# Patient Record
Sex: MALE | Race: WHITE | Employment: OTHER | URBAN - METROPOLITAN AREA
[De-identification: names, ages, dates, MRNs, and addresses within clinical notes are randomized per-mention and may not be internally consistent; named-entity substitution may affect disease eponyms.]

---

## 2017-03-16 ENCOUNTER — HOSPITAL ENCOUNTER (OUTPATIENT)
Dept: LAB | Age: 77
Discharge: HOME | End: 2017-03-16
Payer: MEDICARE

## 2017-03-16 ENCOUNTER — OFFICE VISIT (OUTPATIENT)
Dept: FAMILY MEDICINE CLINIC | Age: 77
End: 2017-03-16

## 2017-03-16 VITALS
HEIGHT: 69 IN | HEART RATE: 57 BPM | DIASTOLIC BLOOD PRESSURE: 74 MMHG | SYSTOLIC BLOOD PRESSURE: 140 MMHG | OXYGEN SATURATION: 94 % | TEMPERATURE: 98 F | WEIGHT: 220 LBS | RESPIRATION RATE: 22 BRPM | BODY MASS INDEX: 32.58 KG/M2

## 2017-03-16 DIAGNOSIS — I10 ESSENTIAL HYPERTENSION, BENIGN: ICD-10-CM

## 2017-03-16 DIAGNOSIS — E11.42 CONTROLLED TYPE 2 DIABETES MELLITUS WITH DIABETIC POLYNEUROPATHY, WITHOUT LONG-TERM CURRENT USE OF INSULIN (HCC): Primary | ICD-10-CM

## 2017-03-16 DIAGNOSIS — M43.16 SPONDYLOLISTHESIS OF LUMBAR REGION: ICD-10-CM

## 2017-03-16 DIAGNOSIS — E11.40 CONTROLLED TYPE 2 DIABETES MELLITUS WITH DIABETIC NEUROPATHY, UNSPECIFIED LONG TERM INSULIN USE STATUS: ICD-10-CM

## 2017-03-16 DIAGNOSIS — E78.2 MIXED HYPERLIPIDEMIA: ICD-10-CM

## 2017-03-16 LAB
GLUCOSE POC: 215 MG/DL
HBA1C MFR BLD HPLC: 8.9 %

## 2017-03-16 PROCEDURE — 80061 LIPID PANEL: CPT

## 2017-03-16 PROCEDURE — 36415 COLL VENOUS BLD VENIPUNCTURE: CPT

## 2017-03-16 PROCEDURE — 80053 COMPREHEN METABOLIC PANEL: CPT

## 2017-03-16 NOTE — MR AVS SNAPSHOT
Visit Information Date & Time Provider Department Dept. Phone Encounter #  
 3/16/2017  8:30 AM Garry Devries [phone] [id number] Follow-up Instructions Return in about 4 months (around 7/16/2017). Upcoming Health Maintenance Date Due  
 FOOT EXAM Q1 12/11/2016 HEMOGLOBIN A1C Q6M 5/17/2017 Pneumococcal 65+ High/Highest Risk (2 of 2 - PCV13) 8/17/2017 MEDICARE YEARLY EXAM 8/18/2017 EYE EXAM RETINAL OR DILATED Q1 11/8/2017 MICROALBUMIN Q1 11/17/2017 LIPID PANEL Q1 11/17/2017 GLAUCOMA SCREENING Q2Y 11/8/2018 DTaP/Tdap/Td series (2 - Td) 12/10/2024 Allergies as of 3/16/2017  Review Complete On: 3/16/2017 By: Tatyana Males Severity Noted Reaction Type Reactions Percocet [Oxycodone-acetaminophen]  02/28/2012    Other (comments) NIGHTMARES Current Immunizations  Reviewed on 8/17/2016 Name Date Influenza High Dose Vaccine PF 11/17/2016 Influenza Vaccine Split 10/11/2011 Pneumococcal Polysaccharide (PPSV-23) 8/17/2016, 12/2/2013 Tdap 12/10/2014 Not reviewed this visit You Were Diagnosed With   
  
 Codes Comments Controlled type 2 diabetes mellitus with diabetic polyneuropathy, without long-term current use of insulin (HCC)    -  Primary ICD-10-CM: E11.42 
ICD-9-CM: 250.60, 357.2 Essential hypertension, benign     ICD-10-CM: I10 
ICD-9-CM: 401.1 Spondylolisthesis of lumbar region     ICD-10-CM: M43.16 
ICD-9-CM: 738.4 Mixed hyperlipidemia     ICD-10-CM: E78.2 ICD-9-CM: 272.2 Controlled type 2 diabetes mellitus with diabetic neuropathy, unspecified long term insulin use status (HCC)     ICD-10-CM: E11.40 ICD-9-CM: 250.60, 357.2 Vitals BP Pulse Temp Resp Height(growth percentile) Weight(growth percentile) 140/74 (BP 1 Location: Left arm, BP Patient Position: Sitting) (!) 57 98 °F (36.7 °C) (Oral) 22 5' 9\" (1.753 m) 220 lb (99.8 kg) SpO2 BMI Smoking Status 94% 32.49 kg/m2 Former Smoker Vitals History BMI and BSA Data Body Mass Index Body Surface Area  
 32.49 kg/m 2 2.2 m 2 Preferred Pharmacy Pharmacy Name Phone [id number] [place], [address] [facility] [address] [phone] Your Updated Medication List  
  
   
This list is accurate as of: 3/16/17  8:53 AM.  Always use your most recent med list. amLODIPine 10 mg tablet Commonly known as:  Rosamaria Nancy Take 1 Tab by mouth daily. aspirin 81 mg tablet Take 81 mg by mouth. atorvastatin 10 mg tablet Commonly known as:  LIPITOR Take 1 Tab by mouth daily. glipiZIDE 5 mg tablet Commonly known as:  Ariadna Tenorio Take 1 Tab by mouth two (2) times a day. * glucose blood VI test strips strip Commonly known as:  ASCENSIA AUTODISC VI, ONE TOUCH ULTRA TEST VI  
by Does Not Apply route daily. * ONETOUCH ULTRA TEST strip Generic drug:  glucose blood VI test strips USE TO TEST BLOOD SUGAR ONC E DAILY * glucose blood VI test strips strip Commonly known as:  ONETOUCH ULTRA TEST Test Blood Sugar once daily  Dx. Code E11.49  
  
 metFORMIN 850 mg tablet Commonly known as:  GLUCOPHAGE  
TAKE 1 TABLET BY MOUTH BEFORE [facility] Oxygen-Air Delivery Systems 2 lpm at night  
  
 quinapril 10 mg tablet Commonly known as:  ACCUPRIL Take 1 Tab by mouth nightly. sildenafil citrate 50 mg tablet Commonly known as:  VIAGRA Take 1 Tab by mouth as needed. * Notice: This list has 3 medication(s) that are the same as other medications prescribed for you. Read the directions carefully, and ask your doctor or other care provider to review them with you. Prescriptions Printed Refills  
 glucose blood VI test strips (ONETOUCH ULTRA TEST) strip 11 Sig: Test Blood Sugar once daily Dx. Code E11.49 Class: Print We Performed the Following AMB POC GLUCOSE, QUANTITATIVE, BLOOD [37473 CPT(R)] AMB POC HEMOGLOBIN A1C [73741 CPT(R)]  DIABETES FOOT EXAM [HM7 Custom] LIPID PANEL [89688 CPT(R)] METABOLIC PANEL, COMPREHENSIVE [69436 CPT(R)] Follow-up Instructions Return in about 4 months (around 7/16/2017). Introducing [facility] & HEALTH SERVICES! Stefan Reid introduces [employer] patient portal. Now you can access parts of your medical record, email your doctor's office, and request medication refills online. 1. In your internet browser, go to [email or web address] 2. Click on the First Time User? Click Here link in the Sign In box. You will see the New Member Sign Up page. 3. Enter your [employer] Access Code exactly as it appears below. You will not need to use this code after youve completed the sign-up process. If you do not sign up before the expiration date, you must request a new code. · [employer] Access Code: [email or web address] Expires: 6/14/2017  8:36 AM 
 
4. Enter the last four digits of your Social Security Number (xxxx) and Date of Birth (mm/dd/yyyy) as indicated and click Submit. You will be taken to the next sign-up page. 5. Create a [employer] ID. This will be your [employer] login ID and cannot be changed, so think of one that is secure and easy to remember. 6. Create a [employer] password. You can change your password at any time. 7. Enter your Password Reset Question and Answer. This can be used at a later time if you forget your password. 8. Enter your e-mail address. You will receive e-mail notification when new information is available in [address] 9. Click Sign Up. You can now view and download portions of your medical record. 10. Click the Download Summary menu link to download a portable copy of your medical information.  
 
If you have questions, please visit the Frequently Asked Questions section of the [employer]. Remember, [employer] is NOT to be used for urgent needs. For medical emergencies, dial 911. Now available from your iPhone and Android! Please provide this summary of care documentation to your next provider. Your primary care clinician is listed as Williams Brunner. If you have any questions after today's visit, please call [phone].

## 2017-03-16 NOTE — PROGRESS NOTES
HISTORY OF PRESENT ILLNESS  Miki Hung is a 68 y.o. male. F/u hbp,dm2 copd,chol. Doing ok. Walking at gym 5 days per week,feeling well,no c/o  Diabetes   The history is provided by the patient. This is a chronic problem. The problem occurs daily. The problem has not changed since onset. Hypertension    This is a chronic problem. The problem has not changed since onset. Pertinent negatives include no malaise/fatigue, no neck pain and no peripheral edema. Cholesterol Problem   This is a chronic problem. The problem occurs daily. The problem has not changed since onset. Review of Systems   Constitutional: Negative for fever and malaise/fatigue. Gastrointestinal: Negative for blood in stool, constipation and melena. Genitourinary: Negative for dysuria, frequency and urgency. Musculoskeletal: Negative for neck pain. Physical Exam   Constitutional: He is oriented to person, place, and time. He appears well-developed and well-nourished. HENT:   Head: Normocephalic and atraumatic. Right Ear: External ear normal.   Left Ear: External ear normal.   Nose: Nose normal.   Mouth/Throat: Oropharynx is clear and moist.   Eyes: Conjunctivae are normal. Pupils are equal, round, and reactive to light. Neck: Normal range of motion. Neck supple. No tracheal deviation present. No thyromegaly present. Cardiovascular: Normal rate, regular rhythm, normal heart sounds and intact distal pulses. Pulmonary/Chest: Effort normal and breath sounds normal. No respiratory distress. He has no wheezes. Abdominal: Soft. Bowel sounds are normal. He exhibits no distension and no mass. There is no tenderness. There is no guarding. Musculoskeletal: Normal range of motion. Comprehensive Diabetic Foot Exam  was performed    . Skin: Skin is warm and dry. Comprehensive Diabetic Foot Exam  was performed     Psychiatric: He has a normal mood and affect. His behavior is normal.   Vitals reviewed.       ASSESSMENT and REDD Nunes was seen today for diabetes, hypertension, cholesterol problem and annual wellness visit. Diagnoses and all orders for this visit:    Encounter for immunization  -     Pneumococcal Polysaccharide vaccine, 23-Valent, Adult or Immunocompromised    Mixed hyperlipidemia  -     atorvastatin (LIPITOR) 10 mg tablet; Take 1 Tab by mouth daily.  -     LIPID PANEL    DM (diabetes mellitus) type II controlled, neurological manifestation (HCC),poor control,wants to try improving diet till next visit before adding additional medication. Diet and exercise encouraged  -     glipiZIDE (GLUCOTROL) 5 mg tablet; Take 1 Tab by mouth two (2) times a day. -     metFORMIN (GLUCOPHAGE) 850 mg tablet; TAKE 1 TABLET BY MOUTH BEFORE BREAKFAST,LUNCH & DINNER  -     AMB POC HEMOGLOBIN B8M  -     METABOLIC PANEL, COMPREHENSIVE  -     AMB POC GLUCOSE, QUANTITATIVE, BLOOD    Essential hypertension, benign  -     amLODIPine (NORVASC) 10 mg tablet; Take 1 Tab by mouth daily. Essential hypertension, hypertension with unspecified goal  -     quinapril (ACCUPRIL) 10 mg tablet; Take 1 Tab by mouth nightly.         Follow-up Disposition: Not on File

## 2017-03-16 NOTE — PROGRESS NOTES
Chief Complaint   Patient presents with    Diabetes     F/U on diabetes.  Hypertension     F/U on BP.  Cholesterol Problem     F/U on cholesterol.

## 2017-03-17 LAB
ALBUMIN SERPL-MCNC: 4.5 G/DL (ref 3.5–4.8)
ALBUMIN/GLOB SERPL: 1.4 {RATIO} (ref 1.2–2.2)
ALP SERPL-CCNC: 63 IU/L (ref 39–117)
ALT SERPL-CCNC: 14 IU/L (ref 0–44)
AST SERPL-CCNC: 17 IU/L (ref 0–40)
BILIRUB SERPL-MCNC: 0.4 MG/DL (ref 0–1.2)
BUN SERPL-MCNC: 13 MG/DL (ref 8–27)
BUN/CREAT SERPL: 17 (ref 10–22)
CALCIUM SERPL-MCNC: 9.5 MG/DL (ref 8.6–10.2)
CHLORIDE SERPL-SCNC: 97 MMOL/L (ref 96–106)
CHOLEST SERPL-MCNC: 149 MG/DL (ref 100–199)
CO2 SERPL-SCNC: 23 MMOL/L (ref 18–29)
CREAT SERPL-MCNC: 0.76 MG/DL (ref 0.76–1.27)
GLOBULIN SER CALC-MCNC: 3.2 G/DL (ref 1.5–4.5)
GLUCOSE SERPL-MCNC: 203 MG/DL (ref 65–99)
HDLC SERPL-MCNC: 43 MG/DL
INTERPRETATION, 910389: NORMAL
LDLC SERPL CALC-MCNC: 69 MG/DL (ref 0–99)
POTASSIUM SERPL-SCNC: 4.9 MMOL/L (ref 3.5–5.2)
PROT SERPL-MCNC: 7.7 G/DL (ref 6–8.5)
SODIUM SERPL-SCNC: 140 MMOL/L (ref 134–144)
TRIGL SERPL-MCNC: 187 MG/DL (ref 0–149)
VLDLC SERPL CALC-MCNC: 37 MG/DL (ref 5–40)

## 2017-05-22 ENCOUNTER — OFFICE VISIT (OUTPATIENT)
Dept: FAMILY MEDICINE CLINIC | Age: 77
End: 2017-05-22

## 2017-05-22 VITALS
DIASTOLIC BLOOD PRESSURE: 76 MMHG | HEIGHT: 69 IN | OXYGEN SATURATION: 97 % | WEIGHT: 217 LBS | TEMPERATURE: 98.3 F | SYSTOLIC BLOOD PRESSURE: 148 MMHG | HEART RATE: 61 BPM | BODY MASS INDEX: 32.14 KG/M2 | RESPIRATION RATE: 18 BRPM

## 2017-05-22 DIAGNOSIS — M67.911 DISORDER OF RIGHT ROTATOR CUFF: ICD-10-CM

## 2017-05-22 DIAGNOSIS — S43.401A SPRAIN OF RIGHT SHOULDER, UNSPECIFIED SHOULDER SPRAIN TYPE, INITIAL ENCOUNTER: Primary | ICD-10-CM

## 2017-05-22 RX ORDER — METHYLPREDNISOLONE ACETATE 40 MG/ML
40 INJECTION, SUSPENSION INTRA-ARTICULAR; INTRALESIONAL; INTRAMUSCULAR; SOFT TISSUE ONCE
Qty: 1 ML | Refills: 0
Start: 2017-05-22 | End: 2017-05-22

## 2017-05-22 NOTE — PROGRESS NOTES
.  Chief Complaint   Patient presents with    Shoulder Pain     right     Alinda Branch about 3 weeks ago off of a ladder. Complained of pain in right shoulder one week after fall. Pain is more severe when he has a twisting motion.

## 2017-05-22 NOTE — PROGRESS NOTES
Chief Complaint   Patient presents with    Shoulder Pain     right         DEPARTMENT [facility]  OFFICE PROCEDURE PROGRESS NOTE        Chart reviewed for the following:   Jacy Ramos, have reviewed the History, Physical and updated the Allergic reactions for [address] performed immediately prior to start of procedure:   Kanika FRASER, have performed the following reviews on [address] prior to the start of the procedure:            * Patient was identified by name and date of birth   * Agreement on procedure being performed was verified  * Risks and Benefits explained to the patient  * Procedure site verified and marked as necessary  * Patient was positioned for comfort  * Consent was signed and verified     Time: 4:20pm      Date of procedure: 5/22/2017    Procedure performed by: Fadi Oviedo MD    Provider assisted by: N/A    Patient assisted by:  N/A    How tolerated by patient:  Well    Post Procedural Pain Scale: 1/10    Comments: Pt received Right shoulder joint injection and tolerated it well, pain before the procedure a level 2/10 and after a level 1/10.

## 2017-05-22 NOTE — PATIENT INSTRUCTIONS
Joint Injections: Care Instructions  Your Care Instructions  Joint injections are shots into a joint, such as the knee. They may be used to put in medicines, such as pain relievers. Or they can be used to take out fluid. Sometimes the fluid is tested in a lab. This can help find the cause of a joint problem. A corticosteroid, or steroid, shot is used to reduce inflammation in tendons or joints. It is often used to treat problems such as arthritis, tendinitis, and bursitis. Steroids can be injected directly into a painful, inflamed joint. They can also help reduce inflammation of a bursa. A bursa is a sac of fluid. It cushions and lubricates areas where tendons, ligaments, skin, muscles, or bones rub against each other. A steroid shot can sometimes help with short-term pain relief when other treatments haven't worked. If steroid shots help, pain may improve for weeks or months. Follow-up care is a key part of your treatment and safety. Be sure to make and go to all appointments, and call your doctor if you are having problems. It's also a good idea to know your test results and keep a list of the medicines you take. How can you care for yourself at home? · Put ice or a cold pack on the area for 10 to 20 minutes at a time. Put a thin cloth between the ice and your skin. · Take anti-inflammatory medicines to reduce pain, swelling, or inflammation. These include ibuprofen (Advil, Motrin) and naproxen (Aleve). Read and follow all instructions on the label. · Avoid strenuous activities for several days, especially those that put stress on the area where you got the shot. · If you have dressings over the area, keep them clean and dry. You may remove them when your doctor tells you to. When should you call for help? Call your doctor now or seek immediate medical care if:  · You have signs of infection, such as:  ¨ Increased pain, swelling, warmth, or redness. ¨ Red streaks leading from the site.   ¨ Pus draining from the site. ¨ A fever. Watch closely for changes in your health, and be sure to contact your doctor if you have any problems. Where can you learn more? Go to http://lit-atiya.info/. Enter N616 in the search box to learn more about \"Joint Injections: Care Instructions. \"  Current as of: May 23, 2016  Content Version: 11.2  © 9211-8473 [employer]. Care instructions adapted under license by [employer] (which disclaims liability or warranty for this information). If you have questions about a medical condition or this instruction, always ask your healthcare professional. [address] any warranty or liability for your use of this information.

## 2017-05-22 NOTE — PROGRESS NOTES
HISTORY OF PRESENT ILLNESS  Bárbara Amador is a 68 y.o. male. fell 1 week ago twisting rt shoulder,Remainsm quite sore,painffull to rotate and twist  Shoulder Pain    The history is provided by the patient. The incident occurred more than 1 week ago. The incident occurred at home. The pain is at a severity of 5/10. The pain is moderate. The pain has been fluctuating since onset. Review of Systems   Constitutional: Negative for fever and malaise/fatigue. Musculoskeletal: Positive for joint pain. Negative for back pain and neck pain. Physical Exam   Constitutional: He appears well-developed and well-nourished. HENT:   Head: Normocephalic. Right Ear: External ear normal.   Left Ear: External ear normal.   Nose: Nose normal.   Mouth/Throat: Oropharynx is clear and moist.   Cardiovascular: Normal rate and regular rhythm. Pulmonary/Chest: Effort normal and breath sounds normal.   Musculoskeletal:        Right shoulder: He exhibits decreased range of motion, tenderness, crepitus and decreased strength. Rotator Cuff maneuvers were suggestive of RC Disorder         ASSESSMENT and PLAN  Des was seen today for shoulder pain. Diagnoses and all orders for this visit:    Sprain of right shoulder, unspecified shoulder sprain type, initial encounter  -     XR SHOULDER RIGHT AP / LATERAL; Future    Disorder of right rotator cuff  -     XR SHOULDER RIGHT AP / LATERAL; Future  -     (DEPO-MEDROL 40 mg  -   quantity 1 for Reimbursement) METHYLPREDNISOLONE ACETATE 40 mg injection  -     methylPREDNISolone acetate (DEPO-MEDROL) 40 mg/mL injection; 1 mL by IntraMUSCular route once for 1 dose. -     20610 - DRAIN/INJECT LARGE JOINT/BURSA      Follow-up Disposition: Not on File  Indications:   Rotator Cuff Syndrome    Procedure:  After consent was obtained,and procedure risks and benefits reviewed,using sterile technique the right shoulder joint    was prepped using Betadine.    The joint was entered [facility] 23 ga needle and 40 mg of Depo-Medrol and 1 ml Marcaine were injected without difficulty ,and the needle was withdrawn. The procedure was well tolerated,with negligile discomfort postprocedure. The patient was asked to continue to rest the joint fora few days before resuming normal activities. They were advised that there may be increased pain for the next 1-2 days,and to watch for fever,redness,or increased swelling or pain. They were advised to call if such symptoms develop. Joint Injection instruction sheet was given to the patient and reviewed. The patient departed in good condition

## 2017-05-22 NOTE — MR AVS SNAPSHOT
Visit Information Date & Time Provider Department Dept. Phone Encounter #  
 5/22/2017  3:00 PM [facility] Ratliff, [address] [facility] [phone] [id number] Your Appointments 7/12/2017  9:30 AM  
ROUTINE CARE with Max Ratliff MD  
[facility]) Appt Note: routine follow up  
 [address] [facility] [address] [phone] [address] [facility] [address] Upcoming Health Maintenance Date Due INFLUENZA AGE 9 TO ADULT 8/1/2017 Pneumococcal 65+ High/Highest Risk (2 of 2 - PCV13) 8/17/2017 MEDICARE YEARLY EXAM 8/18/2017 HEMOGLOBIN A1C Q6M 9/16/2017 EYE EXAM RETINAL OR DILATED Q1 11/8/2017 MICROALBUMIN Q1 11/17/2017 FOOT EXAM Q1 3/16/2018 LIPID PANEL Q1 3/16/2018 GLAUCOMA SCREENING Q2Y 11/8/2018 DTaP/Tdap/Td series (2 - Td) 12/10/2024 Allergies as of 5/22/2017  Review Complete On: 5/22/2017 By: Rosalba Olivia LPN Severity Noted Reaction Type Reactions Percocet [Oxycodone-acetaminophen]  02/28/2012    Other (comments) NIGHTMARES Current Immunizations  Reviewed on 8/17/2016 Name Date Influenza High Dose Vaccine PF 11/17/2016 Influenza Vaccine Split 10/11/2011 Pneumococcal Polysaccharide (PPSV-23) 8/17/2016, 12/2/2013 Tdap 12/10/2014 Not reviewed this visit You Were Diagnosed With   
  
 Codes Comments Sprain of right shoulder, unspecified shoulder sprain type, initial encounter    -  Primary ICD-10-CM: S43.401A ICD-9-CM: 840.9 Disorder of right rotator cuff     ICD-10-CM: M67.911 ICD-9-CM: 726.10 Vitals BP Pulse Temp Resp Height(growth percentile) Weight(growth percentile) 148/76 (BP 1 Location: Left arm, BP Patient Position: Sitting) 61 98.3 °F (36.8 °C) (Oral) 18 5' 9\" (1.753 m) 217 lb (98.4 kg) SpO2 BMI Smoking Status 97% 32.05 kg/m2 Former Smoker Vitals History BMI and BSA Data Body Mass Index Body Surface Area 32.05 kg/m 2 2.19 m 2 Preferred Pharmacy Pharmacy Name Phone [id number] [place], [address] [facility] [phone] Your Updated Medication List  
  
   
This list is accurate as of: 5/22/17  3:46 PM.  Always use your most recent med list. amLODIPine 10 mg tablet Commonly known as:  Gonzalez Romeo Take 1 Tab by mouth daily. aspirin 81 mg tablet Take 81 mg by mouth. atorvastatin 10 mg tablet Commonly known as:  LIPITOR Take 1 Tab by mouth daily. glipiZIDE 5 mg tablet Commonly known as:  Maurie Ray Take 1 Tab by mouth two (2) times a day. * glucose blood VI test strips strip Commonly known as:  ASCENSIA AUTODISC VI, ONE TOUCH ULTRA TEST VI  
by Does Not Apply route daily. * glucose blood VI test strips strip Commonly known as:  ONETOUCH ULTRA TEST Test Blood Sugar once daily  Dx. Code E11.49  
  
 metFORMIN 850 mg tablet Commonly known as:  GLUCOPHAGE  
TAKE 1 TABLET BY MOUTH BEFORE [facility]IN[facility] Oxygen-Air Delivery Systems 2 lpm at night  
  
 quinapril 10 mg tablet Commonly known as:  ACCUPRIL Take 1 Tab by mouth nightly. sildenafil citrate 50 mg tablet Commonly known as:  VIAGRA Take 1 Tab by mouth as needed. * Notice: This list has 2 medication(s) that are the same as other medications prescribed for you. Read the directions carefully, and ask your doctor or other care provider to review them with you. To-Do List   
 05/22/2017 Imaging:  XR SHOULDER RT AP/LAT MIN 2 V Introducing \[facility]\"" & HEALTH SERVICES! [facility] introduces [employer] patient portal. Now you can access parts of your medical record, email your doctor's office, and request medication refills online. 1. In your internet browser, go to [email or web address] 2. Click on the First Time User? Click Here link in the Sign In box. You will see the New Member Sign Up page. 3. Enter your [employer] Access Code exactly as it appears below. You will not need to use this code after youve completed the sign-up process. If you do not sign up before the expiration date, you must request a new code. · [employer] Access Code: [email or web address] Expires: 6/14/2017  8:36 AM 
 
4. Enter the last four digits of your Social Security Number (xxxx) and Date of Birth (mm/dd/yyyy) as indicated and click Submit. You will be taken to the next sign-up page. 5. Create a [employer] ID. This will be your [employer] login ID and cannot be changed, so think of one that is secure and easy to remember. 6. Create a [employer] password. You can change your password at any time. 7. Enter your Password Reset Question and Answer. This can be used at a later time if you forget your password. 8. Enter your e-mail address. You will receive e-mail notification when new information is available in [address] 9. Click Sign Up. You can now view and download portions of your medical record. 10. Click the Download Summary menu link to download a portable copy of your medical information. If you have questions, please visit the Frequently Asked Questions section of the [employer] website. Remember, [employer] is NOT to be used for urgent needs. For medical emergencies, dial 911. Now available from your iPhone and Android! Please provide this summary of care documentation to your next provider. Your primary care clinician is listed as Kristy Magana. If you have any questions after today's visit, please call [phone].

## 2017-06-12 ENCOUNTER — TELEPHONE (OUTPATIENT)
Dept: FAMILY MEDICINE CLINIC | Age: 77
End: 2017-06-12

## 2017-06-12 NOTE — TELEPHONE ENCOUNTER
----- Message from Cher Easton sent at 6/12/2017 10:19 AM EDT -----  Regarding: Dr. Danica Mcneill   Pt request to have a referral to see a specialist for his shoulder. Best contact number for pt is [phone].

## 2017-06-14 DIAGNOSIS — M67.911 DISORDER OF RIGHT ROTATOR CUFF: Primary | ICD-10-CM

## 2017-07-12 ENCOUNTER — OFFICE VISIT (OUTPATIENT)
Dept: FAMILY MEDICINE CLINIC | Age: 77
End: 2017-07-12

## 2017-07-12 ENCOUNTER — HOSPITAL ENCOUNTER (OUTPATIENT)
Dept: LAB | Age: 77
Discharge: HOME | End: 2017-07-12
Payer: MEDICARE

## 2017-07-12 VITALS
TEMPERATURE: 98.3 F | WEIGHT: 221.4 LBS | BODY MASS INDEX: 32.79 KG/M2 | HEART RATE: 63 BPM | RESPIRATION RATE: 22 BRPM | SYSTOLIC BLOOD PRESSURE: 128 MMHG | OXYGEN SATURATION: 94 % | HEIGHT: 69 IN | DIASTOLIC BLOOD PRESSURE: 78 MMHG

## 2017-07-12 DIAGNOSIS — E78.2 MIXED HYPERLIPIDEMIA: ICD-10-CM

## 2017-07-12 DIAGNOSIS — M43.16 SPONDYLOLISTHESIS OF LUMBAR REGION: ICD-10-CM

## 2017-07-12 DIAGNOSIS — I10 ESSENTIAL HYPERTENSION, BENIGN: ICD-10-CM

## 2017-07-12 DIAGNOSIS — E11.42 CONTROLLED TYPE 2 DIABETES MELLITUS WITH DIABETIC POLYNEUROPATHY, WITHOUT LONG-TERM CURRENT USE OF INSULIN (HCC): Primary | ICD-10-CM

## 2017-07-12 DIAGNOSIS — E11.40 CONTROLLED TYPE 2 DIABETES MELLITUS WITH DIABETIC NEUROPATHY, UNSPECIFIED LONG TERM INSULIN USE STATUS: ICD-10-CM

## 2017-07-12 LAB
GLUCOSE POC: 195 MG/DL
HBA1C MFR BLD HPLC: 9.3 %

## 2017-07-12 PROCEDURE — 80061 LIPID PANEL: CPT

## 2017-07-12 PROCEDURE — 36415 COLL VENOUS BLD VENIPUNCTURE: CPT

## 2017-07-12 PROCEDURE — 80053 COMPREHEN METABOLIC PANEL: CPT

## 2017-07-12 RX ORDER — AMLODIPINE BESYLATE 10 MG/1
10 TABLET ORAL DAILY
Qty: 90 TAB | Refills: 3 | Status: SHIPPED | OUTPATIENT
Start: 2017-07-12 | End: 2018-06-22 | Stop reason: SDUPTHER

## 2017-07-12 RX ORDER — MELOXICAM 15 MG/1
TABLET ORAL
Refills: 3 | COMMUNITY
Start: 2017-06-22 | End: 2017-10-25 | Stop reason: ALTCHOICE

## 2017-07-12 RX ORDER — QUINAPRIL 10 MG/1
10 TABLET ORAL
Qty: 90 TAB | Refills: 3 | Status: SHIPPED | OUTPATIENT
Start: 2017-07-12 | End: 2018-06-22 | Stop reason: SDUPTHER

## 2017-07-12 RX ORDER — ATORVASTATIN CALCIUM 10 MG/1
10 TABLET, FILM COATED ORAL DAILY
Qty: 90 TAB | Refills: 3 | Status: SHIPPED | OUTPATIENT
Start: 2017-07-12 | End: 2018-06-28 | Stop reason: SDUPTHER

## 2017-07-12 RX ORDER — METFORMIN HYDROCHLORIDE 850 MG/1
TABLET ORAL
Qty: 270 TAB | Refills: 2 | Status: SHIPPED | OUTPATIENT
Start: 2017-07-12 | End: 2018-04-07 | Stop reason: SDUPTHER

## 2017-07-12 RX ORDER — GLIPIZIDE 5 MG/1
5 TABLET ORAL 2 TIMES DAILY
Qty: 180 TAB | Refills: 3 | Status: CANCELLED | OUTPATIENT
Start: 2017-07-12

## 2017-07-12 RX ORDER — GLIPIZIDE 10 MG/1
10 TABLET ORAL 2 TIMES DAILY
Qty: 180 TAB | Refills: 3 | Status: SHIPPED | OUTPATIENT
Start: 2017-07-12 | End: 2018-06-22 | Stop reason: SDUPTHER

## 2017-07-12 NOTE — MR AVS SNAPSHOT
Visit Information Date & Time Provider Department Dept. Phone Encounter #  
 7/12/2017  9:30 AM Hemalatha Schmidt MD [facility] [phone] [id number] Follow-up Instructions Return in about 4 months (around 11/12/2017). Upcoming Health Maintenance Date Due INFLUENZA AGE 9 TO ADULT 8/1/2017 Pneumococcal 65+ High/Highest Risk (2 of 2 - PCV13) 8/17/2017 MEDICARE YEARLY EXAM 8/18/2017 HEMOGLOBIN A1C Q6M 9/16/2017 MICROALBUMIN Q1 11/17/2017 FOOT EXAM Q1 3/16/2018 LIPID PANEL Q1 3/16/2018 EYE EXAM RETINAL OR DILATED Q1 5/17/2018 GLAUCOMA SCREENING Q2Y 5/17/2019 DTaP/Tdap/Td series (2 - Td) 12/10/2024 Allergies as of 7/12/2017  Review Complete On: 7/12/2017 By: Edwin Klein Severity Noted Reaction Type Reactions Percocet [Oxycodone-acetaminophen]  02/28/2012    Other (comments) NIGHTMARES Current Immunizations  Reviewed on 8/17/2016 Name Date Influenza High Dose Vaccine PF 11/17/2016 Influenza Vaccine Split 10/11/2011 Pneumococcal Polysaccharide (PPSV-23) 8/17/2016, 12/2/2013 Tdap 12/10/2014 Not reviewed this visit You Were Diagnosed With   
  
 Codes Comments Controlled type 2 diabetes mellitus with diabetic polyneuropathy, without long-term current use of insulin (HCC)    -  Primary ICD-10-CM: E11.42 
ICD-9-CM: 250.60, 357.2 Essential hypertension, benign     ICD-10-CM: I10 
ICD-9-CM: 401.1 Spondylolisthesis of lumbar region     ICD-10-CM: M43.16 
ICD-9-CM: 738.4 Mixed hyperlipidemia     ICD-10-CM: E78.2 ICD-9-CM: 272.2 Controlled type 2 diabetes mellitus with diabetic neuropathy, unspecified long term insulin use status (HCC)     ICD-10-CM: E11.40 ICD-9-CM: 250.60, 357.2 Vitals BP Pulse Temp Resp Height(growth percentile) Weight(growth percentile)  128/78 (BP 1 Location: Left arm, BP Patient Position: Sitting) 63 98.3 °F (36.8 °C) (Oral) 22 5' 9\" (1.753 m) 221 lb 6.4 oz (100.4 kg) SpO2 BMI Smoking Status 94% 32.7 kg/m2 Former Smoker Vitals History BMI and BSA Data Body Mass Index Body Surface Area 32.7 kg/m 2 2.21 m 2 Preferred Pharmacy Pharmacy Name Phone [address] [phone] Your Updated Medication List  
  
   
This list is accurate as of: 7/12/17 10:50 AM.  Always use your most recent med list. amLODIPine 10 mg tablet Commonly known as:  Melisa Bhupendra Take 1 Tab by mouth daily. aspirin 81 mg tablet Take 81 mg by mouth. atorvastatin 10 mg tablet Commonly known as:  LIPITOR Take 1 Tab by mouth daily. * glipiZIDE 5 mg tablet Commonly known as:  Valdivia Pane Take 1 Tab by mouth two (2) times a day. * glipiZIDE 10 mg tablet Commonly known as:  Valdivia Pane Take 1 Tab by mouth two (2) times a day. * glucose blood VI test strips strip Commonly known as:  ASCENSIA AUTODISC VI, ONE TOUCH ULTRA TEST VI  
by Does Not Apply route daily. * glucose blood VI test strips strip Commonly known as:  ONETOUCH ULTRA TEST Test Blood Sugar once daily  Dx. Code E11.49  
  
 meloxicam 15 mg tablet Commonly known as:  MOBIC  
TAKE 1 TABLET BY MOUTH EVERY DAY WITH FOOD  
  
 metFORMIN 850 mg tablet Commonly known as:  GLUCOPHAGE  
TAKE 1 TABLET BY MOUTH BEFORE [facility] Oxygen-Air Delivery Systems 2 lpm at night  
  
 quinapril 10 mg tablet Commonly known as:  ACCUPRIL Take 1 Tab by mouth nightly. sildenafil citrate 50 mg tablet Commonly known as:  VIAGRA Take 1 Tab by mouth as needed. * Notice: This list has 4 medication(s) that are the same as other medications prescribed for you. Read the directions carefully, and ask your doctor or other care provider to review them with you. Prescriptions Sent to Pharmacy Refills  
 atorvastatin (LIPITOR) 10 mg tablet 3 Sig: Take 1 Tab by mouth daily. Class: Normal  
 Pharmacy: [address], [address] Ph #: [phone] Route: Oral  
 metFORMIN (GLUCOPHAGE) 850 mg tablet 2 Sig: TAKE 1 TABLET BY MOUTH BEFORE [facility] Class: Normal  
 Pharmacy: [address], [address] Ph #: [phone]  
 amLODIPine (NORVASC) 10 mg tablet 3 Sig: Take 1 Tab by mouth daily. Class: Normal  
 Pharmacy: [address], [address] Ph #: [phone] Route: Oral  
 quinapril (ACCUPRIL) 10 mg tablet 3 Sig: Take 1 Tab by mouth nightly. Class: Normal  
 Pharmacy: [address], [address] Ph #: [phone] Route: Oral  
 glipiZIDE (GLUCOTROL) 10 mg tablet 3 Sig: Take 1 Tab by mouth two (2) times a day. Class: Normal  
 Pharmacy: [address], [address] Ph #: [phone] Route: Oral  
  
We Performed the Following AMB POC GLUCOSE, QUANTITATIVE, BLOOD [57450 CPT(R)] AMB POC HEMOGLOBIN A1C [97747 CPT(R)] LIPID PANEL [19937 CPT(R)] METABOLIC PANEL, COMPREHENSIVE [49662 CPT(R)] Follow-up Instructions Return in about 4 months (around 11/12/2017). Introducing [facility]! Shin Cabrera introduces [employer] patient portal. Now you can access parts of your medical record, email your doctor's office, and request medication refills online. 1. In your internet browser, go to [email or web address] 2. Click on the First Time User? Click Here link in the Sign In box. You will see the New Member Sign Up page. 3. Enter your [employer] Access Code exactly as it appears below. You will not need to use this code after youve completed the sign-up process.  If you do not sign up before the expiration date, you must request a new code. · [employer] Access Code: [facility] Expires: 10/10/2017 10:02 AM 
 
4. Enter the last four digits of your Social Security Number (xxxx) and Date of Birth (mm/dd/yyyy) as indicated and click Submit. You will be taken to the next sign-up page. 5. Create a [employer] ID. This will be your [employer] login ID and cannot be changed, so think of one that is secure and easy to remember. 6. Create a [employer] password. You can change your password at any time. 7. Enter your Password Reset Question and Answer. This can be used at a later time if you forget your password. 8. Enter your e-mail address. You will receive e-mail notification when new information is available in [address] 9. Click Sign Up. You can now view and download portions of your medical record. 10. Click the Download Summary menu link to download a portable copy of your medical information. If you have questions, please visit the Frequently Asked Questions section of the [employer] website. Remember, [employer] is NOT to be used for urgent needs. For medical emergencies, dial 911. Now available from your iPhone and Android! Please provide this summary of care documentation to your next provider. Your primary care clinician is listed as Alexis Caldwell. If you have any questions after today's visit, please call [phone].

## 2017-07-13 LAB
ALBUMIN SERPL-MCNC: 4.8 G/DL (ref 3.5–4.8)
ALBUMIN/GLOB SERPL: 1.7 {RATIO} (ref 1.2–2.2)
ALP SERPL-CCNC: 67 IU/L (ref 39–117)
ALT SERPL-CCNC: 21 IU/L (ref 0–44)
AST SERPL-CCNC: 26 IU/L (ref 0–40)
BILIRUB SERPL-MCNC: 0.4 MG/DL (ref 0–1.2)
BUN SERPL-MCNC: 15 MG/DL (ref 8–27)
BUN/CREAT SERPL: 15 (ref 10–24)
CALCIUM SERPL-MCNC: 10.1 MG/DL (ref 8.6–10.2)
CHLORIDE SERPL-SCNC: 92 MMOL/L (ref 96–106)
CHOLEST SERPL-MCNC: 166 MG/DL (ref 100–199)
CO2 SERPL-SCNC: 23 MMOL/L (ref 18–29)
CREAT SERPL-MCNC: 0.98 MG/DL (ref 0.76–1.27)
GLOBULIN SER CALC-MCNC: 2.8 G/DL (ref 1.5–4.5)
GLUCOSE SERPL-MCNC: 196 MG/DL (ref 65–99)
HDLC SERPL-MCNC: 44 MG/DL
INTERPRETATION, 910389: NORMAL
LDLC SERPL CALC-MCNC: 77 MG/DL (ref 0–99)
Lab: NORMAL
POTASSIUM SERPL-SCNC: 5.2 MMOL/L (ref 3.5–5.2)
PROT SERPL-MCNC: 7.6 G/DL (ref 6–8.5)
SODIUM SERPL-SCNC: 134 MMOL/L (ref 134–144)
TRIGL SERPL-MCNC: 225 MG/DL (ref 0–149)
VLDLC SERPL CALC-MCNC: 45 MG/DL (ref 5–40)

## 2017-07-13 NOTE — PROGRESS NOTES
HISTORY OF PRESENT ILLNESS  Rashid Brower is a 68 y.o. male. F/u hbp,dm2 copd,chol. Doing ok  Diabetes   The history is provided by the patient. This is a chronic problem. The problem occurs daily. The problem has not changed since onset. Hypertension    This is a chronic problem. The problem has not changed since onset. Pertinent negatives include no malaise/fatigue, no neck pain and no peripheral edema. Cholesterol Problem   This is a chronic problem. The problem occurs daily. The problem has not changed since onset. Review of Systems   Constitutional: Negative for fever and malaise/fatigue. Gastrointestinal: Negative for blood in stool, constipation and melena. Genitourinary: Negative for dysuria, frequency and urgency. Musculoskeletal: Negative for neck pain. Physical Exam   Constitutional: He is oriented to person, place, and time. He appears well-developed and well-nourished. HENT:   Head: Normocephalic and atraumatic. Right Ear: External ear normal.   Left Ear: External ear normal.   Nose: Nose normal.   Mouth/Throat: Oropharynx is clear and moist.   Eyes: Conjunctivae are normal. Pupils are equal, round, and reactive to light. Neck: Normal range of motion. Neck supple. No tracheal deviation present. No thyromegaly present. Cardiovascular: Normal rate, regular rhythm, normal heart sounds and intact distal pulses. Pulmonary/Chest: Effort normal and breath sounds normal. No respiratory distress. He has no wheezes. Abdominal: Soft. Bowel sounds are normal. He exhibits no distension and no mass. There is no tenderness. There is no guarding. Musculoskeletal: Normal range of motion. Lymphadenopathy:     He has no cervical adenopathy. Neurological: He is alert and oriented to person, place, and time. Skin: Skin is warm and dry. Comprehensive Diabetic Foot Exam  was performed     Psychiatric: He has a normal mood and affect.  His behavior is normal.   Vitals reviewed. Des was seen today for diabetes, hypertension and cholesterol problem. Diagnoses and all orders for this visit:    Controlled type 2 diabetes mellitus with diabetic polyneuropathy, without long-term current use of insulin (HCC)  -     METABOLIC PANEL, COMPREHENSIVE  -     AMB POC HEMOGLOBIN A1C  -     AMB POC GLUCOSE, QUANTITATIVE, BLOOD  -     glipiZIDE (GLUCOTROL) 10 mg tablet; Take 1 Tab by mouth two (2) times a day. Essential hypertension, benign  -     amLODIPine (NORVASC) 10 mg tablet; Take 1 Tab by mouth daily. -     quinapril (ACCUPRIL) 10 mg tablet; Take 1 Tab by mouth nightly. Spondylolisthesis of lumbar region    Mixed hyperlipidemia  -     LIPID PANEL  -     atorvastatin (LIPITOR) 10 mg tablet; Take 1 Tab by mouth daily. Controlled type 2 diabetes mellitus with diabetic neuropathy, unspecified long term insulin use status (HCC)  -     metFORMIN (GLUCOPHAGE) 850 mg tablet; TAKE 1 TABLET BY MOUTH BEFORE BREAKFAST,LUNCH & DINNER    Other orders  -     Cancel: glipiZIDE (GLUCOTROL) 5 mg tablet; Take 1 Tab by mouth two (2) times a day. Follow-up Disposition:  Return in about 4 months (around 11/12/2017). Follow-up Disposition:  Return in about 4 months (around 11/12/2017).

## 2017-10-17 ENCOUNTER — HOSPITAL ENCOUNTER (OUTPATIENT)
Dept: CT IMAGING | Age: 77
Discharge: HOME | End: 2017-10-17
Attending: ORTHOPAEDIC SURGERY
Payer: MEDICARE

## 2017-10-17 DIAGNOSIS — M19.011 PRIMARY OSTEOARTHRITIS OF RIGHT SHOULDER: ICD-10-CM

## 2017-10-17 PROCEDURE — 73200 CT UPPER EXTREMITY W/O DYE: CPT

## 2017-10-25 ENCOUNTER — OFFICE VISIT (OUTPATIENT)
Dept: FAMILY MEDICINE CLINIC | Age: 77
End: 2017-10-25

## 2017-10-25 ENCOUNTER — HOSPITAL ENCOUNTER (OUTPATIENT)
Dept: LAB | Age: 77
Discharge: HOME | End: 2017-10-25
Payer: MEDICARE

## 2017-10-25 VITALS
OXYGEN SATURATION: 96 % | WEIGHT: 218 LBS | DIASTOLIC BLOOD PRESSURE: 84 MMHG | HEART RATE: 52 BPM | HEIGHT: 69 IN | TEMPERATURE: 97.6 F | BODY MASS INDEX: 32.29 KG/M2 | SYSTOLIC BLOOD PRESSURE: 144 MMHG | RESPIRATION RATE: 20 BRPM

## 2017-10-25 DIAGNOSIS — Z23 ENCOUNTER FOR IMMUNIZATION: ICD-10-CM

## 2017-10-25 DIAGNOSIS — Z01.818 PREOPERATIVE EXAMINATION: ICD-10-CM

## 2017-10-25 DIAGNOSIS — Z00.00 MEDICARE ANNUAL WELLNESS VISIT, SUBSEQUENT: Primary | ICD-10-CM

## 2017-10-25 DIAGNOSIS — E11.42 CONTROLLED TYPE 2 DIABETES MELLITUS WITH DIABETIC POLYNEUROPATHY, WITHOUT LONG-TERM CURRENT USE OF INSULIN (HCC): ICD-10-CM

## 2017-10-25 DIAGNOSIS — E78.2 MIXED HYPERLIPIDEMIA: ICD-10-CM

## 2017-10-25 DIAGNOSIS — I10 ESSENTIAL HYPERTENSION, BENIGN: ICD-10-CM

## 2017-10-25 DIAGNOSIS — G47.33 OSA (OBSTRUCTIVE SLEEP APNEA): ICD-10-CM

## 2017-10-25 LAB
GLUCOSE POC: 113 MG/DL
HBA1C MFR BLD HPLC: 8.3 %

## 2017-10-25 PROCEDURE — 80061 LIPID PANEL: CPT

## 2017-10-25 PROCEDURE — 80053 COMPREHEN METABOLIC PANEL: CPT

## 2017-10-25 PROCEDURE — 36415 COLL VENOUS BLD VENIPUNCTURE: CPT

## 2017-10-25 NOTE — PROGRESS NOTES
Chief Complaint   Patient presents with    Pre-op Exam     Pt getting pre-op for R shoulder surgery on 11-20-17.  Diabetes     F/U on diabetes.  Hypertension     F/U on BP.  Cholesterol Problem     F/U on cholesterol.  Immunization/Injection     Pt getting flu shot. 1. Have you been to the ER, urgent care clinic since your last visit? Hospitalized since your last visit? No    2. Have you seen or consulted any other health care providers outside of the [address] [place] [address] since your last visit? Include any pap smears or colon screening.  No

## 2017-10-25 NOTE — PROGRESS NOTES
HISTORY OF PRESENT ILLNESS  Damari Roberts is a 68 y.o. male. preop exam for shoulder surgery,medicra wellness exam,DM,HBP,Chol. Doing well  Well Male   This is a chronic problem. The problem has not changed since onset. Pertinent negatives include no chest pain, no abdominal pain, no headaches and no shortness of breath. Diabetes   This is a chronic problem. The problem occurs daily. The problem has not changed since onset. Pertinent negatives include no chest pain, no abdominal pain, no headaches and no shortness of breath. Hypertension    This is a chronic problem. Pertinent negatives include no chest pain, no orthopnea, no palpitations, no malaise/fatigue, no headaches, no peripheral edema, no dizziness and no shortness of breath. Shoulder Pain    There was no injury mechanism. The pain is at a severity of 4/10. The pain is moderate. The pain has been fluctuating since onset. The pain does not radiate. He has no other injuries. Review of Systems   Constitutional: Negative for malaise/fatigue. Respiratory: Negative for shortness of breath. Cardiovascular: Negative for chest pain, palpitations and orthopnea. Gastrointestinal: Negative for abdominal pain. Genitourinary: Negative for frequency. Musculoskeletal: Positive for joint pain. Neurological: Negative for dizziness and headaches. Physical Exam   Constitutional: He is oriented to person, place, and time. He appears well-developed and well-nourished. HENT:   Head: Normocephalic and atraumatic. Right Ear: External ear normal.   Left Ear: External ear normal.   Nose: Nose normal.   Mouth/Throat: Oropharynx is clear and moist.   Eyes: Conjunctivae are normal. Pupils are equal, round, and reactive to light. Neck: Normal range of motion. Neck supple. No tracheal deviation present. No thyromegaly present. Cardiovascular: Normal rate, regular rhythm, normal heart sounds and intact distal pulses. Exam reveals no gallop.     No murmur heard.  Pulmonary/Chest: Effort normal and breath sounds normal. No respiratory distress. He has no wheezes. Abdominal: Soft. Bowel sounds are normal. He exhibits no distension. There is no tenderness. Musculoskeletal:        Right shoulder: He exhibits decreased range of motion, tenderness and decreased strength. Neurological: He is alert and oriented to person, place, and time. He has normal reflexes. Skin: Skin is warm and dry. Psychiatric: He has a normal mood and affect. Vitals reviewed. ASSESSMENT and PLAN  Diagnoses and all orders for this visit:    1. Medicare annual wellness visit, subsequent    2. Preoperative examination    3. Controlled type 2 diabetes mellitus with diabetic polyneuropathy, without long-term current use of insulin (HCC)  -     AMB POC HEMOGLOBIN A1C  -     AMB POC GLUCOSE, QUANTITATIVE, BLOOD  -     SITagliptin (JANUVIA) 100 mg tablet; Take 1 Tab by mouth daily. 4. Essential hypertension, benign  -     METABOLIC PANEL, COMPREHENSIVE    5. Mixed hyperlipidemia  -     LIPID PANEL    6. GEORGES (obstructive sleep apnea)    7. Encounter for immunization  -     Influenza virus vaccine (FLUZONE HIGH-DOSE) 65 years and older (50400)  -     pneumococcal 13 david conj dip (PREVNAR-13) 0.5 mL syrg injection; 0.5 mL by IntraMUSCular route once for 1 dose.     Other orders  -     CVD REPORT      Follow-up Disposition: Not on File

## 2017-10-25 NOTE — PROGRESS NOTES
This is a Subsequent Medicare Annual Wellness Exam (AWV) (Performed 12 months after IPPE or effective date of Medicare Part B enrollment)    I have reviewed the patient's medical history in detail and updated the computerized patient record. History     Past Medical History:   Diagnosis Date    Arthritis     [facility] ([facility]     Carotid stenosis 7/15/2010    Colon cancer ([facility] 4/5/2010    Diabetes (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 12/7/2014    Encounter for long-term (current) use of other medications 1/10/2011    Hypertension     Mixed hyperlipidemia 4/5/2010    GEORGES (obstructive sleep apnea) 4/5/2010    Unspecified sleep apnea     O2 AT NIGHT 2L, CANNOT USE CPAP MASK      Past Surgical History:   Procedure Laterality Date    HX COLECTOMY  6/1997    RESECTION    HX KNEE ARTHROSCOPY      BILATERAL    HX LUMBAR LAMINECTOMY      HX OTHER SURGICAL  1972    CIRCUMCISION, VASECTOMY     Current Outpatient Prescriptions   Medication Sig Dispense Refill    atorvastatin (LIPITOR) 10 mg tablet Take 1 Tab by mouth daily. 90 Tab 3    metFORMIN (GLUCOPHAGE) 850 mg tablet TAKE 1 TABLET BY MOUTH BEFORE BREAKFAST,LUNCH & DINNER 270 Tab 2    amLODIPine (NORVASC) 10 mg tablet Take 1 Tab by mouth daily. 90 Tab 3    quinapril (ACCUPRIL) 10 mg tablet Take 1 Tab by mouth nightly. 90 Tab 3    glipiZIDE (GLUCOTROL) 10 mg tablet Take 1 Tab by mouth two (2) times a day. 180 Tab 3    glucose blood VI test strips (ONETOUCH ULTRA TEST) strip Test Blood Sugar once daily    Dx. Code E11.49 50 Strip 11    glucose blood VI test strips (ASCENSIA AUTODISC VI, ONE TOUCH ULTRA TEST VI) strip by Does Not Apply route daily. 35 Strip 11    Oxygen-Air Delivery Systems 2 lpm at night 1 Each 0    aspirin 81 mg tablet Take 81 mg by mouth.          Allergies   Allergen Reactions    Percocet [Oxycodone-Acetaminophen] Other (comments) NIGHTMARES     Family History   Problem Relation Age of Onset    Stroke Mother     Heart Disease Father     Stroke Brother     Heart Disease Brother     Heart Disease Brother     Heart Disease Brother      Social History   Substance Use Topics    Smoking status: Former Smoker     Years: 25.00     Quit date: 1/28/1982    Smokeless tobacco: Former User    Alcohol use No     Patient Active Problem List   Diagnosis Code    Diverticular disease of colon K57.30    Colon cancer ([facility] C18.9    GEORGES (obstructive sleep apnea) G47.33    Mixed hyperlipidemia E78.2    Carotid stenosis I65.29    Encounter for long-term (current) use of other medications Z79.899    Diabetic retinopathy, background ([facility] E11.3299    Spondylolisthesis of lumbar region M43.16    DM (diabetes mellitus) type II controlled, neurological manifestation ([facility] E11.49    Essential hypertension, benign I10       Depression Risk Factor Screening:     PHQ over the last two weeks 10/25/2017   Little interest or pleasure in doing things Not at all   Feeling down, depressed or hopeless Not at all   Total Score PHQ 2 0     Alcohol Risk Factor Screening: You do not drink alcohol or very rarely. Functional Ability and Level of Safety:   Hearing Loss  The patient wears hearing aids. Activities of Daily Living  The home contains: no safety equipment. Patient does total self care    Fall RiskFall Risk Assessment, last 12 mths 10/25/2017   Able to walk? Yes   Fall in past 12 months? No       Abuse Screen  Patient is not abused    Cognitive Screening   Evaluation of Cognitive Function:  Has your family/caregiver stated any concerns about your memory: no  Normal    Patient Care Team   Patient Care Team:  Facundo Scott MD as PCP - General    Assessment/Plan   Education and counseling provided:  Are appropriate based on today's review and evaluation    Diagnoses and all orders for this visit:    1.  Medicare annual wellness visit, subsequent    2. Controlled type 2 diabetes mellitus with diabetic polyneuropathy, without long-term current use of insulin (HCC)  -     AMB POC HEMOGLOBIN A1C  -     AMB POC GLUCOSE, QUANTITATIVE, BLOOD    3. Essential hypertension, benign  -     METABOLIC PANEL, COMPREHENSIVE    4.  Mixed hyperlipidemia  -     LIPID PANEL        Health Maintenance Due   Topic Date Due    INFLUENZA AGE 9 TO ADULT  08/01/2017    Pneumococcal 65+ High/Highest Risk (2 of 2 - PCV13) 08/17/2017    MEDICARE YEARLY EXAM  08/18/2017    MICROALBUMIN Q1  11/17/2017

## 2017-10-25 NOTE — MR AVS SNAPSHOT
Visit Information Date & Time Provider Department Dept. Phone Encounter #  
 10/25/2017  8:15 AM Garry Pepe [phone] [id number] Upcoming Health Maintenance Date Due INFLUENZA AGE 9 TO ADULT 8/1/2017 Pneumococcal 65+ High/Highest Risk (2 of 2 - PCV13) 8/17/2017 MEDICARE YEARLY EXAM 8/18/2017 MICROALBUMIN Q1 11/17/2017 HEMOGLOBIN A1C Q6M 1/12/2018 FOOT EXAM Q1 3/16/2018 EYE EXAM RETINAL OR DILATED Q1 5/17/2018 LIPID PANEL Q1 7/12/2018 GLAUCOMA SCREENING Q2Y 5/17/2019 DTaP/Tdap/Td series (2 - Td) 12/10/2024 Allergies as of 10/25/2017  Review Complete On: 10/25/2017 By: Erwin Hall Severity Noted Reaction Type Reactions Percocet [Oxycodone-acetaminophen]  02/28/2012    Other (comments) NIGHTMARES Current Immunizations  Reviewed on 8/17/2016 Name Date Influenza High Dose Vaccine PF  Incomplete, 11/17/2016 Influenza Vaccine Split 10/11/2011 Pneumococcal Polysaccharide (PPSV-23) 8/17/2016, 12/2/2013 Tdap 12/10/2014 Not reviewed this visit You Were Diagnosed With   
  
 Codes Comments Medicare annual wellness visit, subsequent    -  Primary ICD-10-CM: Z00.00 ICD-9-CM: V70.0 Preoperative examination     ICD-10-CM: Z01.818 ICD-9-CM: V72.84 Controlled type 2 diabetes mellitus with diabetic polyneuropathy, without long-term current use of insulin (HCC)     ICD-10-CM: E11.42 
ICD-9-CM: 250.60, 357.2 Essential hypertension, benign     ICD-10-CM: I10 
ICD-9-CM: 401.1 Mixed hyperlipidemia     ICD-10-CM: E78.2 ICD-9-CM: 272.2   
 GEORGES (obstructive sleep apnea)     ICD-10-CM: G47.33 
ICD-9-CM: 327.23 Encounter for immunization     ICD-10-CM: N22 ICD-9-CM: V03.89 Vitals BP Pulse Temp Resp Height(growth percentile) Weight(growth percentile)  144/84 (BP 1 Location: Left arm, BP Patient Position: Sitting) (!) 52 97.6 °F (36.4 °C) (Oral) 20 5' 9\" (1.753 m) 218 lb (98.9 kg) SpO2 BMI Smoking Status 96% 32.19 kg/m2 Former Smoker Vitals History BMI and BSA Data Body Mass Index Body Surface Area  
 32.19 kg/m 2 2.19 m 2 Preferred Pharmacy Pharmacy Name Phone [id number], [address] [place] [address] [phone] Your Updated Medication List  
  
   
This list is accurate as of: 10/25/17  8:58 AM.  Always use your most recent med list. amLODIPine 10 mg tablet Commonly known as:  Nicolas Ruths Take 1 Tab by mouth daily. aspirin 81 mg tablet Take 81 mg by mouth. atorvastatin 10 mg tablet Commonly known as:  LIPITOR Take 1 Tab by mouth daily. glipiZIDE 10 mg tablet Commonly known as:  Dolph Siad Take 1 Tab by mouth two (2) times a day. * glucose blood VI test strips strip Commonly known as:  ASCENSIA AUTODISC VI, ONE TOUCH ULTRA TEST VI  
by Does Not Apply route daily. * glucose blood VI test strips strip Commonly known as:  ONETOUCH ULTRA TEST Test Blood Sugar once daily  Dx. Code E11.49  
  
 metFORMIN 850 mg tablet Commonly known as:  GLUCOPHAGE  
TAKE 1 TABLET BY MOUTH BEFORE [facility] Oxygen-Air Delivery Systems 2 lpm at night  
  
 pneumococcal 13 david conj dip 0.5 mL Syrg injection Commonly known as:  PREVNAR-13  
0.5 mL by IntraMUSCular route once for 1 dose. quinapril 10 mg tablet Commonly known as:  ACCUPRIL Take 1 Tab by mouth nightly. SITagliptin 100 mg tablet Commonly known as:  Acosta Cortez Take 1 Tab by mouth daily. * Notice: This list has 2 medication(s) that are the same as other medications prescribed for you. Read the directions carefully, and ask your doctor or other care provider to review them with you. Prescriptions Printed  Refills  
 pneumococcal 13 david conj dip (PREVNAR-13) 0.5 mL syrg injection 0  
 Si.5 mL by IntraMUSCular route once for 1 dose. Class: Print Route: IntraMUSCular Prescriptions Sent to Pharmacy Refills SITagliptin (JANUVIA) 100 mg tablet 1 Sig: Take 1 Tab by mouth daily. Class: Normal  
 Pharmacy: [id number] [place], [address] [place] [address] #: [phone] Route: Oral  
  
We Performed the Following AMB POC GLUCOSE, QUANTITATIVE, BLOOD [79992 CPT(R)] AMB POC HEMOGLOBIN A1C [91604 CPT(R)] INFLUENZA VIRUS VACCINE, HIGH DOSE SEASONAL, PRESERVATIVE FREE [17270 CPT(R)] LIPID PANEL [21009 CPT(R)] METABOLIC PANEL, COMPREHENSIVE [15149 CPT(R)] To-Do List   
 2017 10:00 AM  
  Appointment with MITCHELL YOUNGER at [facility] ([phone]) Introducing [facility]! Cristina Naranjo introduces [employer] patient portal. Now you can access parts of your medical record, email your doctor's office, and request medication refills online. 1. In your internet browser, go to [email or web address] 2. Click on the First Time User? Click Here link in the Sign In box. You will see the New Member Sign Up page. 3. Enter your [employer] Access Code exactly as it appears below. You will not need to use this code after youve completed the sign-up process. If you do not sign up before the expiration date, you must request a new code. · [employer] Access Code: [email or web address] Expires: 2018 10:32 AM 
 
4. Enter the last four digits of your Social Security Number (xxxx) and Date of Birth (mm/dd/yyyy) as indicated and click Submit. You will be taken to the next sign-up page. 5. Create a [employer] ID. This will be your [employer] login ID and cannot be changed, so think of one that is secure and easy to remember. 6. Create a [employer] password. You can change your password at any time. 7. Enter your Password Reset Question and Answer.  This can be used at a later time if you forget your password. 8. Enter your e-mail address. You will receive e-mail notification when new information is available in [address] 9. Click Sign Up. You can now view and download portions of your medical record. 10. Click the Download Summary menu link to download a portable copy of your medical information. If you have questions, please visit the Frequently Asked Questions section of the [employer] website. Remember, [employer] is NOT to be used for urgent needs. For medical emergencies, dial 911. Now available from your iPhone and Android! Please provide this summary of care documentation to your next provider. Your primary care clinician is listed as Aron Christensen. If you have any questions after today's visit, please call [phone].

## 2017-10-26 LAB
ALBUMIN SERPL-MCNC: 4.5 G/DL (ref 3.5–4.8)
ALBUMIN/GLOB SERPL: 1.6 {RATIO} (ref 1.2–2.2)
ALP SERPL-CCNC: 54 IU/L (ref 39–117)
ALT SERPL-CCNC: 17 IU/L (ref 0–44)
AST SERPL-CCNC: 19 IU/L (ref 0–40)
BILIRUB SERPL-MCNC: 0.4 MG/DL (ref 0–1.2)
BUN SERPL-MCNC: 14 MG/DL (ref 8–27)
BUN/CREAT SERPL: 16 (ref 10–24)
CALCIUM SERPL-MCNC: 10.1 MG/DL (ref 8.6–10.2)
CHLORIDE SERPL-SCNC: 100 MMOL/L (ref 96–106)
CHOLEST SERPL-MCNC: 118 MG/DL (ref 100–199)
CO2 SERPL-SCNC: 24 MMOL/L (ref 18–29)
CREAT SERPL-MCNC: 0.88 MG/DL (ref 0.76–1.27)
GFR SERPLBLD CREATININE-BSD FMLA CKD-EPI: 83 ML/MIN/1.73
GFR SERPLBLD CREATININE-BSD FMLA CKD-EPI: 96 ML/MIN/1.73
GLOBULIN SER CALC-MCNC: 2.9 G/DL (ref 1.5–4.5)
GLUCOSE SERPL-MCNC: 119 MG/DL (ref 65–99)
HDLC SERPL-MCNC: 39 MG/DL
INTERPRETATION, 910389: NORMAL
LDLC SERPL CALC-MCNC: 51 MG/DL (ref 0–99)
POTASSIUM SERPL-SCNC: 4.9 MMOL/L (ref 3.5–5.2)
PROT SERPL-MCNC: 7.4 G/DL (ref 6–8.5)
SODIUM SERPL-SCNC: 142 MMOL/L (ref 134–144)
TRIGL SERPL-MCNC: 142 MG/DL (ref 0–149)
VLDLC SERPL CALC-MCNC: 28 MG/DL (ref 5–40)

## 2017-11-09 ENCOUNTER — HOSPITAL ENCOUNTER (OUTPATIENT)
Dept: PREADMISSION TESTING | Age: 77
Discharge: HOME | End: 2017-11-09
Payer: MEDICARE

## 2017-11-09 VITALS
TEMPERATURE: 98.1 F | DIASTOLIC BLOOD PRESSURE: 91 MMHG | WEIGHT: 212.3 LBS | OXYGEN SATURATION: 96 % | BODY MASS INDEX: 31.44 KG/M2 | RESPIRATION RATE: 16 BRPM | SYSTOLIC BLOOD PRESSURE: 171 MMHG | HEIGHT: 69 IN | HEART RATE: 54 BPM

## 2017-11-09 LAB
ABO + RH BLD: NORMAL
ALBUMIN SERPL-MCNC: 4.2 G/DL (ref 3.5–5)
ALBUMIN/GLOB SERPL: 1.2 {RATIO} (ref 1.1–2.2)
ALP SERPL-CCNC: 63 U/L (ref 45–117)
ALT SERPL-CCNC: 25 U/L (ref 12–78)
ANION GAP SERPL CALC-SCNC: 9 MMOL/L (ref 5–15)
APPEARANCE UR: CLEAR
APTT PPP: 28.3 SEC (ref 22.1–32.5)
AST SERPL-CCNC: 24 U/L (ref 15–37)
ATRIAL RATE: 55 BPM
BACTERIA URNS QL MICRO: NEGATIVE /HPF
BASOPHILS # BLD: 0 K/UL (ref 0–0.1)
BASOPHILS NFR BLD: 0 % (ref 0–1)
BILIRUB SERPL-MCNC: 0.5 MG/DL (ref 0.2–1)
BILIRUB UR QL: NEGATIVE
BLOOD GROUP ANTIBODIES SERPL: NORMAL
BUN SERPL-MCNC: 13 MG/DL (ref 6–20)
BUN/CREAT SERPL: 15 (ref 12–20)
CALCIUM SERPL-MCNC: 9.7 MG/DL (ref 8.5–10.1)
CALCULATED P AXIS, ECG09: 53 DEGREES
CALCULATED R AXIS, ECG10: 54 DEGREES
CALCULATED T AXIS, ECG11: -8 DEGREES
CHLORIDE SERPL-SCNC: 102 MMOL/L (ref 97–108)
CO2 SERPL-SCNC: 30 MMOL/L (ref 21–32)
COLOR UR: ABNORMAL
CREAT SERPL-MCNC: 0.84 MG/DL (ref 0.7–1.3)
DIAGNOSIS, 93000: NORMAL
EOSINOPHIL # BLD: 0.4 K/UL (ref 0–0.4)
EOSINOPHIL NFR BLD: 6 % (ref 0–7)
EPITH CASTS URNS QL MICRO: ABNORMAL /LPF
ERYTHROCYTE [DISTWIDTH] IN BLOOD BY AUTOMATED COUNT: 15.5 % (ref 11.5–14.5)
EST. AVERAGE GLUCOSE BLD GHB EST-MCNC: 183 MG/DL
GLOBULIN SER CALC-MCNC: 3.4 G/DL (ref 2–4)
GLUCOSE SERPL-MCNC: 107 MG/DL (ref 65–100)
GLUCOSE UR STRIP.AUTO-MCNC: NEGATIVE MG/DL
HBA1C MFR BLD: 8 % (ref 4.2–6.3)
HCT VFR BLD AUTO: 38.6 % (ref 36.6–50.3)
HGB BLD-MCNC: 11.9 G/DL (ref 12.1–17)
HGB UR QL STRIP: NEGATIVE
HYALINE CASTS URNS QL MICRO: ABNORMAL /LPF (ref 0–5)
INR PPP: 1.1 (ref 0.9–1.1)
KETONES UR QL STRIP.AUTO: NEGATIVE MG/DL
LEUKOCYTE ESTERASE UR QL STRIP.AUTO: NEGATIVE
LYMPHOCYTES # BLD: 2 K/UL (ref 0.8–3.5)
LYMPHOCYTES NFR BLD: 26 % (ref 12–49)
MCH RBC QN AUTO: 24.9 PG (ref 26–34)
MCHC RBC AUTO-ENTMCNC: 30.8 G/DL (ref 30–36.5)
MCV RBC AUTO: 80.8 FL (ref 80–99)
MONOCYTES # BLD: 0.6 K/UL (ref 0–1)
MONOCYTES NFR BLD: 7 % (ref 5–13)
NEUTS SEG # BLD: 4.8 K/UL (ref 1.8–8)
NEUTS SEG NFR BLD: 61 % (ref 32–75)
NITRITE UR QL STRIP.AUTO: NEGATIVE
P-R INTERVAL, ECG05: 240 MS
PH UR STRIP: 6 [PH] (ref 5–8)
PLATELET # BLD AUTO: 196 K/UL (ref 150–400)
POTASSIUM SERPL-SCNC: 4.6 MMOL/L (ref 3.5–5.1)
PROT SERPL-MCNC: 7.6 G/DL (ref 6.4–8.2)
PROT UR STRIP-MCNC: ABNORMAL MG/DL
PROTHROMBIN TIME: 10.6 SEC (ref 9–11.1)
Q-T INTERVAL, ECG07: 482 MS
QRS DURATION, ECG06: 146 MS
QTC CALCULATION (BEZET), ECG08: 461 MS
RBC # BLD AUTO: 4.78 M/UL (ref 4.1–5.7)
RBC #/AREA URNS HPF: ABNORMAL /HPF (ref 0–5)
SODIUM SERPL-SCNC: 141 MMOL/L (ref 136–145)
SP GR UR REFRACTOMETRY: 1 (ref 1–1.03)
SPECIMEN EXP DATE BLD: NORMAL
THERAPEUTIC RANGE,PTTT: NORMAL SECS (ref 58–77)
UA: UC IF INDICATED,UAUC: ABNORMAL
UROBILINOGEN UR QL STRIP.AUTO: 0.2 EU/DL (ref 0.2–1)
VENTRICULAR RATE, ECG03: 55 BPM
WBC # BLD AUTO: 7.8 K/UL (ref 4.1–11.1)
WBC URNS QL MICRO: ABNORMAL /HPF (ref 0–4)

## 2017-11-09 PROCEDURE — 81001 URINALYSIS AUTO W/SCOPE: CPT | Performed by: ORTHOPAEDIC SURGERY

## 2017-11-09 PROCEDURE — 80053 COMPREHEN METABOLIC PANEL: CPT | Performed by: ORTHOPAEDIC SURGERY

## 2017-11-09 PROCEDURE — 85730 THROMBOPLASTIN TIME PARTIAL: CPT | Performed by: ORTHOPAEDIC SURGERY

## 2017-11-09 PROCEDURE — 36415 COLL VENOUS BLD VENIPUNCTURE: CPT | Performed by: ORTHOPAEDIC SURGERY

## 2017-11-09 PROCEDURE — 85610 PROTHROMBIN TIME: CPT | Performed by: ORTHOPAEDIC SURGERY

## 2017-11-09 PROCEDURE — 93005 ELECTROCARDIOGRAM TRACING: CPT

## 2017-11-09 PROCEDURE — 83036 HEMOGLOBIN GLYCOSYLATED A1C: CPT | Performed by: ORTHOPAEDIC SURGERY

## 2017-11-09 PROCEDURE — 85025 COMPLETE CBC W/AUTO DIFF WBC: CPT | Performed by: ORTHOPAEDIC SURGERY

## 2017-11-09 PROCEDURE — 86900 BLOOD TYPING SEROLOGIC ABO: CPT | Performed by: ORTHOPAEDIC SURGERY

## 2017-11-09 NOTE — H&P
PAT Pre-Op History & Physical    Patient: Lynne Farrell                  MRN: [id number]          SSN: [id number]  YOB: 1940          Age: 68 y.o. Sex: male                Subjective:     Patient is a 68 y.o.  male who presents with history of chronic right shoulder pain that has been going on for at least ten years. Patient states he has limited ROM and cannot lift his arm above shoulder height. Last May patient fell onto his right elbow causing more intense pain up into his shoulder. Patient states if he tries to reach behind into his pocket, or twists wrong the pain is worse. He states pain stays around a 8/10 and describes it as sharp and stabbing. He has failed injections and NSAID. The patient was evaluated in the surgeon's office and it was determined that the most appropriate plan of care is to proceed with surgical intervention.   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    DM (diabetes mellitus) type II controlled, neurological manifestation ([address] 12/7/2014    Encounter for long-term (current) use of other medications     Hypertension     pt reports medication was started due to history of DM    Mixed hyperlipidemia 04/05/2010    pt reports medication was started due to history of DM    GEORGES (obstructive sleep apnea) 4/5/2010    Unspecified sleep apnea     O2 AT NIGHT 2L, CANNOT USE CPAP MASK      Past Surgical History:   Procedure Laterality Date    HX COLECTOMY  6/1997    RESECTION    HX KNEE ARTHROSCOPY Left 1971    HX KNEE ARTHROSCOPY Right 1991    HX LUMBAR LAMINECTOMY  2012    HX OTHER SURGICAL  1971    CIRCUMCISION, VASECTOMY    HX WISDOM TEETH EXTRACTION  1980      Prior to Admission medications    Medication Sig Start Date End Date Taking? Authorizing Provider   atorvastatin (LIPITOR) 10 mg tablet Take 1 Tab by mouth daily. Patient taking differently: Take 10 mg by mouth nightly. 7/12/17  Yes Chase Trammell MD   metFORMIN (GLUCOPHAGE) 850 mg tablet TAKE 1 TABLET BY MOUTH BEFORE [facility] D[facility] 7/12/17  Yes Chase Trammell MD   amLODIPine (NORVASC) 10 mg tablet Take 1 Tab by mouth daily. Patient taking differently: Take 10 mg by mouth every morning. 7/12/17  Yes Chase Trammell MD   quinapril (ACCUPRIL) 10 mg tablet Take 1 Tab by mouth nightly. 7/12/17  Yes Chase Trammell MD   glipiZIDE (GLUCOTROL) 10 mg tablet Take 1 Tab by mouth two (2) times a day. 7/12/17  Yes Chase Trammell MD   glucose blood VI test strips (ONETOUCH ULTRA TEST) strip Test Blood Sugar once daily    Dx. Code E11.49 3/16/17  Yes Chase Trammell MD   glucose blood VI test strips (ASCENSIA AUTODISC VI, ONE TOUCH ULTRA TEST VI) strip by Does Not Apply route daily. 4/3/14  Yes Chase Trammell MD   Oxygen-Air Delivery Systems 2 lpm at night 3/27/13  Yes Chase Trammell MD   aspirin 81 mg tablet Take 81 mg by mouth. Yes Historical Provider     Current Outpatient Prescriptions   Medication Sig    atorvastatin (LIPITOR) 10 mg tablet Take 1 Tab by mouth daily. (Patient taking differently: Take 10 mg by mouth nightly.)    metFORMIN (GLUCOPHAGE) 850 mg tablet TAKE 1 TABLET BY MOUTH BEFORE BREAKFAST,LUNCH & DINNER    amLODIPine (NORVASC) 10 mg tablet Take 1 Tab by mouth daily. (Patient taking differently: Take 10 mg by mouth every morning.)    quinapril (ACCUPRIL) 10 mg tablet Take 1 Tab by mouth nightly.  glipiZIDE (GLUCOTROL) 10 mg tablet Take 1 Tab by mouth two (2) times a day.  glucose blood VI test strips (ONETOUCH ULTRA TEST) strip Test Blood Sugar once daily    Dx.  Code E11.49    glucose blood VI test strips (ASCENSIA AUTODISC VI, ONE TOUCH ULTRA TEST VI) strip by Does Not Apply route daily.  Oxygen-Air Delivery Systems 2 lpm at night    aspirin 81 mg tablet Take 81 mg by mouth. No current facility-administered medications for this encounter. Allergies   Allergen Reactions    Lortab [Hydrocodone-Acetaminophen] Other (comments)     Nightmares      Social History   Substance Use Topics    Smoking status: Former Smoker     Packs/day: 1.50     Years: 25.00     Quit date: 1982    Smokeless tobacco: Former User     Quit date: 2005    Alcohol use 4.2 oz/week     7 Glasses of wine per week      History   Drug Use No     Family History   Problem Relation Age of Onset    Stroke Mother     Heart Disease Father     Stroke Brother     Heart Disease Brother     Heart Disease Brother     Heart Disease Brother     No Known Problems Daughter          Review of Systems    Patient denies difficulty swallowing, mouth sores, or loose teeth. Patient denies any recent dental procedures or any planned prior to surgery. Patient denies chest pain, tightness, pain radiating down left arm, palpitations. Denies dizziness, visual disturbances, or lightheadedness. Patient denies shortness of breath, wheezing, cough, fever, or chills. Patient denies diarrhea, constipation, or abdominal pain. Patient denies urinary problems including dysuria, hesitancy, urgency, or incontinence. Denies skin breakdown, rashes, insect bites or open area. Objective:     Patient Vitals for the past 24 hrs:   Temp Pulse Resp BP SpO2   17 1041 - - - (!) 171/91 -   17 1004 98.1 °F (36.7 °C) (!) 54 16 177/86 96 %     Patient did not take his BP medication this morning    Temp (24hrs), Av.1 °F (36.7 °C), Min:98.1 °F (36.7 °C), Max:98.1 °F (36.7 °C)    Body mass index is 31.35 kg/(m^2).   Wt Readings from Last 1 Encounters:   17 96.3 kg (212 lb 4.9 oz)        Physical Exam:     General: Pleasant,  cooperative, no apparent distress, appears stated age. Eyes: Conjunctivae/corneas clear. EOMs intact. Nose: Nares normal.   Mouth/Throat: Lips, mucosa, and tongue normal. Bottom and Top dentures   Lungs: Clear to auscultation bilaterally. Heart: Bradycardic, S1, S2 normal. No murmur, click, rub or gallop. Abdomen: Soft, non-tender. Bowel sounds normal. No distention. Musculoskeletal:  Limited ROM to right shoulder   Extremities:  Extremities normal, atraumatic, no cyanosis or edema. Calves                                 supple, non tender to palpation. Pulses: 2+ and symmetric bilateral upper extremities. Cap. refill <2 seconds   Skin: Skin color, texture, turgor normal. No visible open areas, examined fully clothed   Neurologic: CN II-XII grossly intact. Alert and oriented x3. Labs:   Recent Results (from the past 72 hour(s))   TYPE & SCREEN    Collection Time: 11/09/17 10:51 AM   Result Value Ref Range    Crossmatch Expiration 11/23/2017     ABO/Rh(D) A POSITIVE     Antibody screen NEG    CBC WITH AUTOMATED DIFF    Collection Time: 11/09/17 10:51 AM   Result Value Ref Range    WBC 7.8 4.1 - 11.1 K/uL    RBC 4.78 4.10 - 5.70 M/uL    HGB 11.9 (L) 12.1 - 17.0 g/dL    HCT 38.6 36.6 - 50.3 %    MCV 80.8 80.0 - 99.0 FL    MCH 24.9 (L) 26.0 - 34.0 PG    MCHC 30.8 30.0 - 36.5 g/dL    RDW 15.5 (H) 11.5 - 14.5 %    PLATELET 301 815 - 801 K/uL    NEUTROPHILS 61 32 - 75 %    LYMPHOCYTES 26 12 - 49 %    MONOCYTES 7 5 - 13 %    EOSINOPHILS 6 0 - 7 %    BASOPHILS 0 0 - 1 %    ABS. NEUTROPHILS 4.8 1.8 - 8.0 K/UL    ABS. LYMPHOCYTES 2.0 0.8 - 3.5 K/UL    ABS. MONOCYTES 0.6 0.0 - 1.0 K/UL    ABS. EOSINOPHILS 0.4 0.0 - 0.4 K/UL    ABS.  BASOPHILS 0.0 0.0 - 0.1 K/UL   METABOLIC PANEL, COMPREHENSIVE    Collection Time: 11/09/17 10:51 AM   Result Value Ref Range    Sodium 141 136 - 145 mmol/L    Potassium 4.6 3.5 - 5.1 mmol/L    Chloride 102 97 - 108 mmol/L    CO2 30 21 - 32 mmol/L    Anion gap 9 5 - 15 mmol/L    Glucose 107 (H) 65 - 100 mg/dL    BUN 13 6 - 20 MG/DL    Creatinine 0.84 0.70 - 1.30 MG/DL    BUN/Creatinine ratio 15 12 - 20      GFR est AA >60 >60 ml/min/1.73m2    GFR est non-AA >60 >60 ml/min/1.73m2    Calcium 9.7 8.5 - 10.1 MG/DL    Bilirubin, total 0.5 0.2 - 1.0 MG/DL    ALT (SGPT) 25 12 - 78 U/L    AST (SGOT) 24 15 - 37 U/L    Alk.  phosphatase 63 45 - 117 U/L    Protein, total 7.6 6.4 - 8.2 g/dL    Albumin 4.2 3.5 - 5.0 g/dL    Globulin 3.4 2.0 - 4.0 g/dL    A-G Ratio 1.2 1.1 - 2.2     PROTHROMBIN TIME + INR    Collection Time: 11/09/17 10:51 AM   Result Value Ref Range    INR 1.1 0.9 - 1.1      Prothrombin time 10.6 9.0 - 11.1 sec   PTT    Collection Time: 11/09/17 10:51 AM   Result Value Ref Range    aPTT 28.3 22.1 - 32.5 sec    aPTT, therapeutic range     58.0 - 77.0 SECS   URINALYSIS W/ REFLEX CULTURE    Collection Time: 11/09/17 10:51 AM   Result Value Ref Range    Color YELLOW/STRAW      Appearance CLEAR CLEAR      Specific gravity 1.005 1.003 - 1.030      pH (UA) 6.0 5.0 - 8.0      Protein TRACE (A) NEG mg/dL    Glucose NEGATIVE  NEG mg/dL    Ketone NEGATIVE  NEG mg/dL    Bilirubin NEGATIVE  NEG      Blood NEGATIVE  NEG      Urobilinogen 0.2 0.2 - 1.0 EU/dL    Nitrites NEGATIVE  NEG      Leukocyte Esterase NEGATIVE  NEG      WBC 0-4 0 - 4 /hpf    RBC 0-5 0 - 5 /hpf    Epithelial cells FEW FEW /lpf    Bacteria NEGATIVE  NEG /hpf    UA:UC IF INDICATED CULTURE NOT INDICATED BY UA RESULT CNI      Hyaline cast 0-2 0 - 5 /lpf   EKG, 12 LEAD, INITIAL    Collection Time: 11/09/17 11:18 AM   Result Value Ref Range    Ventricular Rate 55 BPM    Atrial Rate 55 BPM    P-R Interval 240 ms    QRS Duration 146 ms    Q-T Interval 482 ms    QTC Calculation (Bezet) 461 ms    Calculated P Axis 53 degrees    Calculated R Axis 54 degrees    Calculated T Axis -8 degrees    Diagnosis       Sinus bradycardia with 1st degree AV block  Right bundle branch block  Abnormal ECG  When compared with ECG of 28-FEB-2012 11:31,  TX interval has increased         Assessment: OA Right Shoulder    Plan:     Scheduled for RIGHT TOTAL SHOULDER ARTHROPLASTY (GEN W/ BLOCK) . Labs and EKG reviewed. A1C 8- sent to attending surgeon.  MRSA negative    Lopez Richadrson, NP

## 2017-11-09 NOTE — PERIOP NOTES
Pt here today for PAT appointment, DOS 11/20/17. When reviewing allergies, pt unsure if previously documented percocet is an allergy for him. Call placed to prescribing office, [facility], and medication allergy is Lortab per their records. Allergy corrected in [facility].

## 2017-11-10 LAB
BACTERIA SPEC CULT: NORMAL
BACTERIA SPEC CULT: NORMAL
SERVICE CMNT-IMP: NORMAL

## 2017-11-10 NOTE — PERIOP NOTES
Referral to DTC made per joint protocol for A1C of 8. History of DM2. Patient stated in interview that he has been working hard on getting his number down.  Attending surgeon, PCP and patient made aware

## 2017-11-17 ENCOUNTER — HOSPITAL ENCOUNTER (OUTPATIENT)
Dept: DIABETES SERVICES | Age: 77
Discharge: HOME | End: 2017-11-17
Payer: MEDICARE

## 2017-11-17 PROCEDURE — G0108 DIAB MANAGE TRN  PER INDIV: HCPCS

## 2017-11-17 NOTE — DIABETES MGMT
[facility] Ortho Education Note    Recommendations/ Comments: Pt seen in outpt DTC pre-op today, 2017 as part of elevated A1c protocol. Pt states his last A1c was 9.3% and he brought it down to 8.0% in a little over 1 month as a result of him decreasing portions at meals, eliminating snacking and desserts. MD has increased his Glipizide on 10/29/17. His surgery has been cancelled and will be rescheduled once A1c is at goal.     Chart reviewed and initial evaluation complete on [address] Patient is a 68 y.o. male with known Type 2 Diabetes on oral agents (dual therapy): glipizide (Glucotrol) 10 mg 1x/d, metformin (generic) 850 mg TID at meals, at home. A1c:   Lab Results   Component Value Date/Time    Hemoglobin A1c 8.0 2017 10:51 AM       Assessed and instructed patient on the following:   · risk of wound infection/ delayed healing   · interpretation of lab results (A1c is trending down)  ·  blood sugar goals (<130 before meals; <180 2 hr pp)  ·  hypoglycemia prevention and treatment- pt carries chocolate candy with him, so reviewed \"Rule of 15\"  ·  exercise- pt goes to the gym 3-4d/wk and walks 45 minutes on the treadmill  ·  nutrition- based on recall, pts portions are typically <45gm CHO per meal    Encouraged the followin.dietary modifications: include CHO at each meal  2. regular blood sugar monitorin times daily, varying before and 2 hr pp  3. Contact PCP post-op if BG's are consistently >150       Provided patient with the following:  [x]            CHO counting education materials         Will continue to follow as needed inpatient, once he has his surgery. Thank you. Hiwot Valente, KIMBERLY, CDE

## 2018-01-09 DIAGNOSIS — E11.40 CONTROLLED TYPE 2 DIABETES MELLITUS WITH DIABETIC NEUROPATHY, UNSPECIFIED LONG TERM INSULIN USE STATUS: ICD-10-CM

## 2018-01-09 NOTE — TELEPHONE ENCOUNTER
[facility] pharmacy called stating they need a new script for one ultra test strips. [facility] stated that the script came over with no diagnosis code on it.  [facility] can be reached at [phone]

## 2018-01-15 ENCOUNTER — HOSPITAL ENCOUNTER (OUTPATIENT)
Dept: LAB | Age: 78
Discharge: HOME | End: 2018-01-15
Payer: MEDICARE

## 2018-01-15 ENCOUNTER — OFFICE VISIT (OUTPATIENT)
Dept: FAMILY MEDICINE CLINIC | Age: 78
End: 2018-01-15

## 2018-01-15 VITALS
TEMPERATURE: 98 F | WEIGHT: 213.04 LBS | DIASTOLIC BLOOD PRESSURE: 62 MMHG | HEIGHT: 69 IN | SYSTOLIC BLOOD PRESSURE: 138 MMHG | HEART RATE: 63 BPM | BODY MASS INDEX: 31.55 KG/M2 | RESPIRATION RATE: 24 BRPM | OXYGEN SATURATION: 94 %

## 2018-01-15 DIAGNOSIS — Z01.818 PREOPERATIVE CLEARANCE: ICD-10-CM

## 2018-01-15 DIAGNOSIS — E11.42 CONTROLLED TYPE 2 DIABETES MELLITUS WITH DIABETIC POLYNEUROPATHY, WITHOUT LONG-TERM CURRENT USE OF INSULIN (HCC): ICD-10-CM

## 2018-01-15 DIAGNOSIS — E78.2 MIXED HYPERLIPIDEMIA: ICD-10-CM

## 2018-01-15 DIAGNOSIS — M19.019 SHOULDER ARTHRITIS: Primary | ICD-10-CM

## 2018-01-15 DIAGNOSIS — I10 ESSENTIAL HYPERTENSION, BENIGN: ICD-10-CM

## 2018-01-15 LAB
GLUCOSE POC: 157 MG/DL
HBA1C MFR BLD HPLC: 6.3 %

## 2018-01-15 PROCEDURE — 80053 COMPREHEN METABOLIC PANEL: CPT

## 2018-01-15 PROCEDURE — 36415 COLL VENOUS BLD VENIPUNCTURE: CPT

## 2018-01-15 NOTE — PROGRESS NOTES
HISTORY OF PRESENT ILLNESS  Deya Castro is a 68 y.o. male. preop exam for shoulder surgery,medicra wellness exam,DM,HBP,Chol. Doing well  Well Male   This is a chronic problem. The problem has not changed since onset. Pertinent negatives include no chest pain, no abdominal pain, no headaches and no shortness of breath. Diabetes   This is a chronic problem. The problem occurs daily. The problem has not changed since onset. Pertinent negatives include no chest pain, no abdominal pain, no headaches and no shortness of breath. Hypertension    This is a chronic problem. Pertinent negatives include no chest pain, no orthopnea, no palpitations, no malaise/fatigue, no headaches, no peripheral edema, no dizziness and no shortness of breath. Shoulder Pain    There was no injury mechanism. The pain is at a severity of 4/10. The pain is moderate. The pain has been fluctuating since onset. The pain does not radiate. He has no other injuries. Review of Systems   Constitutional: Negative for malaise/fatigue. Respiratory: Negative for shortness of breath. Cardiovascular: Negative for chest pain, palpitations and orthopnea. Gastrointestinal: Negative for abdominal pain. Genitourinary: Negative for frequency. Musculoskeletal: Positive for joint pain. Neurological: Negative for dizziness and headaches. Physical Exam   Constitutional: He is oriented to person, place, and time. He appears well-developed and well-nourished. HENT:   Head: Normocephalic and atraumatic. Right Ear: External ear normal.   Left Ear: External ear normal.   Nose: Nose normal.   Mouth/Throat: Oropharynx is clear and moist.   Eyes: Conjunctivae are normal. Pupils are equal, round, and reactive to light. Neck: Normal range of motion. Neck supple. No tracheal deviation present. No thyromegaly present. Cardiovascular: Normal rate, regular rhythm, normal heart sounds and intact distal pulses. Exam reveals no gallop.     No murmur heard.  Pulmonary/Chest: Effort normal and breath sounds normal. No respiratory distress. He has no wheezes. Abdominal: Soft. Bowel sounds are normal. He exhibits no distension. There is no tenderness. Musculoskeletal:        Right shoulder: He exhibits decreased range of motion, tenderness and decreased strength. Neurological: He is alert and oriented to person, place, and time. He has normal reflexes. Skin: Skin is warm and dry. Psychiatric: He has a normal mood and affect. Vitals reviewed. ASSESSMENT and PLAN  Diagnoses and all orders for this visit:    1. Shoulder arthritis    2. Preoperative clearance    3. Controlled type 2 diabetes mellitus with diabetic polyneuropathy, without long-term current use of insulin ([facility])  -     AMB POC HEMOGLOBIN T8Z  -     METABOLIC PANEL, COMPREHENSIVE  -     AMB POC GLUCOSE, QUANTITATIVE, BLOOD    4. Mixed hyperlipidemia    5.  Essential hypertension, benign      Follow-up Disposition: Not on File

## 2018-01-15 NOTE — MR AVS SNAPSHOT
Maria Guadalupe Hoang 
 
 
 [address] [facility] 7 [id number] [phone] Patient: Alban Hidalgo MRN: [id number] Visit Information Date & Time Provider Department Dept. Phone Encounter #  
 1/15/2018  2:00 PM Marino Batres, [address] [place] [address] [phone] [id number] Follow-up Instructions Return in about 3 months (around 4/15/2018). Upcoming Health Maintenance Date Due MICROALBUMIN Q1 11/17/2017 FOOT EXAM Q1 3/16/2018 HEMOGLOBIN A1C Q6M 5/9/2018 LIPID PANEL Q1 10/25/2018 MEDICARE YEARLY EXAM 10/26/2018 EYE EXAM RETINAL OR DILATED Q1 11/15/2018 GLAUCOMA SCREENING Q2Y 11/15/2019 DTaP/Tdap/Td series (2 - Td) 12/10/2024 Allergies as of 1/15/2018  Review Complete On: 1/15/2018 By: Marino Batres MD  
  
 Severity Noted Reaction Type Reactions Lortab [Hydrocodone-acetaminophen]  11/09/2017    Other (comments) Nightmares Current Immunizations  Reviewed on 10/25/2017 Name Date Influenza High Dose Vaccine PF 10/25/2017, 11/17/2016 Influenza Vaccine Split 10/11/2011 Pneumococcal Conjugate (PCV-13) 10/30/2017 Pneumococcal Polysaccharide (PPSV-23) 8/17/2016, 12/2/2013 Tdap 12/10/2014 Not reviewed this visit You Were Diagnosed With   
  
 Codes Comments Shoulder arthritis    -  Primary ICD-10-CM: M19.019 
ICD-9-CM: 716.91 Preoperative clearance     ICD-10-CM: Q67.604 ICD-9-CM: V72.84 Controlled type 2 diabetes mellitus with diabetic polyneuropathy, without long-term current use of insulin (HCC)     ICD-10-CM: E11.42 
ICD-9-CM: 250.60, 357.2 Mixed hyperlipidemia     ICD-10-CM: E78.2 ICD-9-CM: 272.2 Essential hypertension, benign     ICD-10-CM: I10 
ICD-9-CM: 401.1 Vitals  BP Pulse Temp Resp Height(growth percentile) Weight(growth percentile)  
 138/62 (BP 1 Location: Right arm, BP Patient Position: Sitting) 63 98 °F (36.7 °C) (Oral) 24 5' 9\" (1.753 m) 213 lb 0.6 oz (96.6 kg) SpO2 BMI Smoking Status 94% 31.46 kg/m2 Former Smoker Vitals History BMI and BSA Data Body Mass Index Body Surface Area  
 31.46 kg/m 2 2.17 m 2 Preferred Pharmacy Pharmacy Name Phone [id number], [address] [place] [address] [phone] Your Updated Medication List  
  
   
This list is accurate as of: 1/15/18  2:34 PM.  Always use your most recent med list. amLODIPine 10 mg tablet Commonly known as:  Kristy Prow Take 1 Tab by mouth daily. aspirin 81 mg tablet Take 81 mg by mouth. atorvastatin 10 mg tablet Commonly known as:  LIPITOR Take 1 Tab by mouth daily. glipiZIDE 10 mg tablet Commonly known as:  Latrelle Lose Take 1 Tab by mouth two (2) times a day. * glucose blood VI test strips strip Commonly known as:  ASCENSIA AUTODISC VI, ONE TOUCH ULTRA TEST VI  
by Does Not Apply route daily. * glucose blood VI test strips strip Commonly known as:  ONETOUCH ULTRA TEST Test Blood Sugar once daily  Dx. Code E11.49  
  
 metFORMIN 850 mg tablet Commonly known as:  GLUCOPHAGE  
TAKE 1 TABLET BY MOUTH BEFORE [facility] DINNER Oxygen-Air Delivery Systems 2 lpm at night  
  
 quinapril 10 mg tablet Commonly known as:  ACCUPRIL Take 1 Tab by mouth nightly. * Notice: This list has 2 medication(s) that are the same as other medications prescribed for you. Read the directions carefully, and ask your doctor or other care provider to review them with you. We Performed the Following AMB POC GLUCOSE, QUANTITATIVE, BLOOD [20982 CPT(R)] AMB POC HEMOGLOBIN A1C [28529 CPT(R)] METABOLIC PANEL, COMPREHENSIVE [51872 CPT(R)] Follow-up Instructions Return in about 3 months (around 4/15/2018). Introducing [facility]!    
 Morena Mendez introduces [employer] patient portal. Now you can access parts of your medical record, email your doctor's office, and request medication refills online. 1. In your internet browser, go to [email or web address] 2. Click on the First Time User? Click Here link in the Sign In box. You will see the New Member Sign Up page. 3. Enter your [employer] Access Code exactly as it appears below. You will not need to use this code after youve completed the sign-up process. If you do not sign up before the expiration date, you must request a new code. · [employer] Access Code: [email or web address] 
Expires: 4/15/2018  1:50 PM 
 
4. Enter the last four digits of your Social Security Number (xxxx) and Date of Birth (mm/dd/yyyy) as indicated and click Submit. You will be taken to the next sign-up page. 5. Create a [employer] ID. This will be your [employer] login ID and cannot be changed, so think of one that is secure and easy to remember. 6. Create a [employer] password. You can change your password at any time. 7. Enter your Password Reset Question and Answer. This can be used at a later time if you forget your password. 8. Enter your e-mail address. You will receive e-mail notification when new information is available in [address] 9. Click Sign Up. You can now view and download portions of your medical record. 10. Click the Download Summary menu link to download a portable copy of your medical information. If you have questions, please visit the Frequently Asked Questions section of the [employer] website. Remember, [employer] is NOT to be used for urgent needs. For medical emergencies, dial 911. Now available from your iPhone and Android! Please provide this summary of care documentation to your next provider. Your primary care clinician is listed as Nikki Broderick. If you have any questions after today's visit, please call [phone].

## 2018-01-16 LAB
ALBUMIN SERPL-MCNC: 4.2 G/DL (ref 3.5–4.8)
ALBUMIN/GLOB SERPL: 1.6 {RATIO} (ref 1.2–2.2)
ALP SERPL-CCNC: 66 IU/L (ref 39–117)
ALT SERPL-CCNC: 12 IU/L (ref 0–44)
AST SERPL-CCNC: 16 IU/L (ref 0–40)
BILIRUB SERPL-MCNC: 0.3 MG/DL (ref 0–1.2)
BUN SERPL-MCNC: 15 MG/DL (ref 8–27)
BUN/CREAT SERPL: 16 (ref 10–24)
CALCIUM SERPL-MCNC: 9.5 MG/DL (ref 8.6–10.2)
CHLORIDE SERPL-SCNC: 101 MMOL/L (ref 96–106)
CO2 SERPL-SCNC: 23 MMOL/L (ref 18–29)
CREAT SERPL-MCNC: 0.91 MG/DL (ref 0.76–1.27)
GLOBULIN SER CALC-MCNC: 2.7 G/DL (ref 1.5–4.5)
GLUCOSE SERPL-MCNC: 145 MG/DL (ref 65–99)
POTASSIUM SERPL-SCNC: 4.9 MMOL/L (ref 3.5–5.2)
PROT SERPL-MCNC: 6.9 G/DL (ref 6–8.5)
SODIUM SERPL-SCNC: 143 MMOL/L (ref 134–144)

## 2018-01-29 ENCOUNTER — HOSPITAL ENCOUNTER (OUTPATIENT)
Dept: PREADMISSION TESTING | Age: 78
Discharge: HOME | End: 2018-01-29
Payer: MEDICARE

## 2018-01-29 VITALS
BODY MASS INDEX: 31.28 KG/M2 | HEIGHT: 69 IN | OXYGEN SATURATION: 96 % | HEART RATE: 68 BPM | RESPIRATION RATE: 17 BRPM | DIASTOLIC BLOOD PRESSURE: 71 MMHG | TEMPERATURE: 98.2 F | SYSTOLIC BLOOD PRESSURE: 162 MMHG | WEIGHT: 211.2 LBS

## 2018-01-29 LAB
ABO + RH BLD: NORMAL
APPEARANCE UR: CLEAR
APTT PPP: 30 SEC (ref 22.1–32.5)
BACTERIA URNS QL MICRO: NEGATIVE /HPF
BASOPHILS # BLD: 0 K/UL (ref 0–0.1)
BASOPHILS NFR BLD: 0 % (ref 0–1)
BILIRUB UR QL: NEGATIVE
BLOOD GROUP ANTIBODIES SERPL: NORMAL
COLOR UR: ABNORMAL
DIFFERENTIAL METHOD BLD: ABNORMAL
EOSINOPHIL # BLD: 1 K/UL (ref 0–0.4)
EOSINOPHIL NFR BLD: 10 % (ref 0–7)
EPITH CASTS URNS QL MICRO: ABNORMAL /LPF
ERYTHROCYTE [DISTWIDTH] IN BLOOD BY AUTOMATED COUNT: 15.9 % (ref 11.5–14.5)
GLUCOSE UR STRIP.AUTO-MCNC: NEGATIVE MG/DL
HCT VFR BLD AUTO: 36.8 % (ref 36.6–50.3)
HGB BLD-MCNC: 10.8 G/DL (ref 12.1–17)
HGB UR QL STRIP: NEGATIVE
HYALINE CASTS URNS QL MICRO: ABNORMAL /LPF (ref 0–5)
IMM GRANULOCYTES # BLD: 0 K/UL (ref 0–0.04)
IMM GRANULOCYTES NFR BLD AUTO: 0 % (ref 0–0.5)
INR PPP: 1 (ref 0.9–1.1)
KETONES UR QL STRIP.AUTO: NEGATIVE MG/DL
LEUKOCYTE ESTERASE UR QL STRIP.AUTO: NEGATIVE
LYMPHOCYTES # BLD: 1.9 K/UL (ref 0.8–3.5)
LYMPHOCYTES NFR BLD: 19 % (ref 12–49)
MCH RBC QN AUTO: 24.1 PG (ref 26–34)
MCHC RBC AUTO-ENTMCNC: 29.3 G/DL (ref 30–36.5)
MCV RBC AUTO: 82.1 FL (ref 80–99)
MONOCYTES # BLD: 0.9 K/UL (ref 0–1)
MONOCYTES NFR BLD: 9 % (ref 5–13)
NEUTS SEG # BLD: 6.2 K/UL (ref 1.8–8)
NEUTS SEG NFR BLD: 62 % (ref 32–75)
NITRITE UR QL STRIP.AUTO: NEGATIVE
NRBC # BLD: 0 K/UL (ref 0–0.01)
NRBC BLD-RTO: 0 PER 100 WBC
PH UR STRIP: 6.5 [PH] (ref 5–8)
PLATELET # BLD AUTO: 259 K/UL (ref 150–400)
PMV BLD AUTO: 11.3 FL (ref 8.9–12.9)
PROT UR STRIP-MCNC: 30 MG/DL
PROTHROMBIN TIME: 10.4 SEC (ref 9–11.1)
RBC # BLD AUTO: 4.48 M/UL (ref 4.1–5.7)
RBC #/AREA URNS HPF: ABNORMAL /HPF (ref 0–5)
SP GR UR REFRACTOMETRY: 1.01 (ref 1–1.03)
SPECIMEN EXP DATE BLD: NORMAL
THERAPEUTIC RANGE,PTTT: NORMAL SECS (ref 58–77)
UA: UC IF INDICATED,UAUC: ABNORMAL
UROBILINOGEN UR QL STRIP.AUTO: 0.2 EU/DL (ref 0.2–1)
WBC # BLD AUTO: 10.1 K/UL (ref 4.1–11.1)
WBC URNS QL MICRO: ABNORMAL /HPF (ref 0–4)

## 2018-01-29 PROCEDURE — 85730 THROMBOPLASTIN TIME PARTIAL: CPT | Performed by: ORTHOPAEDIC SURGERY

## 2018-01-29 PROCEDURE — 85025 COMPLETE CBC W/AUTO DIFF WBC: CPT | Performed by: ORTHOPAEDIC SURGERY

## 2018-01-29 PROCEDURE — 81001 URINALYSIS AUTO W/SCOPE: CPT | Performed by: ORTHOPAEDIC SURGERY

## 2018-01-29 PROCEDURE — 85610 PROTHROMBIN TIME: CPT | Performed by: ORTHOPAEDIC SURGERY

## 2018-01-29 PROCEDURE — 86900 BLOOD TYPING SEROLOGIC ABO: CPT | Performed by: ORTHOPAEDIC SURGERY

## 2018-01-29 PROCEDURE — 36415 COLL VENOUS BLD VENIPUNCTURE: CPT | Performed by: ORTHOPAEDIC SURGERY

## 2018-01-29 NOTE — PERIOP NOTES
[facility]  PREOPERATIVE INSTRUCTIONS    Surgery Date:   2/12/2018  Surgery arrival time given by surgeon: NO   If [facility] staff will call you between 4 PM- 8 PM the day before surgery with your arrival time. If your surgery is on a Monday, we will call you the preceding Friday. Please call [phone] after 8 PM if you did not receive your arrival time. 1. Please report at the designated time to the [address] [place] [address] [facility]. Bring your insurance card, photo identification, and any copayment ( if applicable). 2. You must have a responsible adult to drive you home. You need to have a responsible adult to stay with you the first 24 hours after surgery if you are going home the same day of your surgery and you should not drive a car for 24 hours following your surgery. 3. Nothing to eat or drink after midnight the night before surgery. This includes no water, gum, mints, coffee, juice, etc.  Please note special instructions, if applicable, below for medications. 4. MEDICATIONS TO TAKE THE MORNING OF SURGERY WITH A SIP OF WATER: ___amlodipine, ___________        Your prescription pain medicine may be taken with a sip of water the morning of surgery  5. No alcoholic beverages 24 hours before or after your surgery. 6. If you are being admitted to the hospital, please leave personal belongings/luggage in your car until you have an assigned hospital room number. 7. Stop Aspirin as directed by Dr Tami Lee    Call Dr Octavio Neely  8. Stop any non-steroidal anti-inflammatory drugs (i.e. Ibuprofen, Naproxen, Advil, Aleve) as directed by your surgeon. You may take Tylenol. 9. Stop herbal supplements 1 week prior to surgery. 10. If you are currently taking Plavix, Coumadin,or any other blood-thinning/anticoagulant medication contact your surgeon for instructions. 11. Please wear comfortable clothes. Wear your glasses instead of contacts. We ask that all money, jewelry and valuables be left at home.  Wear no make-up, particularly mascara, the day of surgery. 12.  All body piercings, rings and jewelry need to be removed and left at home. Please wear your hair loose or down. Please no pony-tails, buns, or any metal hair accessories. If you shower the morning of surgery, please do not apply any lotions, powders, or deodorants afterwards. Do not shave any body area within 24 hours of your surgery. 13. Please follow all instructions to avoid any potential surgical cancellation. 14.  Should your physical condition change, (i.e. fever, cold, flu, etc.) please notify your surgeon as soon as possible. 15. It is important to be on time. If a situation occurs where you may be delayed, please call:  [phone] / [phone] on the day of surgery. 16. The Preadmission Testing staff can be reached at 21 [phone]. .  16. Bring your completed Medication Reconciliation sheet with your the morning of surgery    Special instructions: Diabetic patients:  If allowed by your surgeon, eat a good protein snack before midnight the night before your surgery  DO NOT [address] your blood sugar the morning of surgery and if it is low you may use glucose tabs    If you are being admitted, please have the family member or person taking you home at the hospital by 11 am on the day of discharge to allow for them to participate in your discharge instructions. · Free  Parking between 7am & 5pm    Special Instructions:  Use Chlorhexidine Care Fusion wash and sponges 3 days prior to surgery as instructed. Incentive spirometer given with instructions to practice at home and bring back to the hospital on the day of surgery. [facility] will contact you if your Hemoglobin A1C is greater than 7.5. Ensure/Glucerna  sample, nutritional information, and Ensure/Glucerna coupon given. Pain pamphlet and Call Don't Fall reminder reviewed with patient.    parking is complimentary Monday - Friday 7 am - 5 pm  Bring PTA Medication list day of surgery with the last doses taken documented  The patient was contacted  in person. He  verbalizes  understanding of all instructions   Medications reviewed and med reconciliation sheets for prescriptions given to patient for review & return day of surgery.

## 2018-01-29 NOTE — H&P
PAT Pre-Op History & Physical    Patient: Chantelle Dickerson                  MRN: [id number]          SSN: [id number]  YOB: 1940          Age: 68 y.o. Sex: male                Subjective:   Patient is a 68 y.o.  male who presents with history of chronic right shoulder pain that has been a problem for at least 15 years per patient report. States that in May of last year he slipped off a ladder and fell onto his right elbow- since that accident his right shoulder pain has escalated. Rates his right shoulder pain as high as 7/10 with movement and describes the pain as intermittent aching but can be sharp, stabbing at times. Has failed steroid joint injections and NSAIDs. Patient is right hand dominant. CT of right UE done 10/17/2017 showed:    Degenerative changes of the glenohumeral joint with significant glenoid retroversion     The patient was evaluated in the surgeon's office and it was determined that the most appropriate plan of care is to proceed with surgical intervention. Patient's PCP Hyacinth Meyer MD    Patient was scheduled for surgery 11/20/2017 but was cancelled due to HgbA1C being elevated (8.0) at that time. Patient has adjusted his diet and exercises regularly- last HgbA1C= 6.3  on 01/15/2017 at PCP office.         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    DM (diabetes mellitus) type II controlled, neurological manifestation ([address] 12/7/2014    Encounter for long-term (current) use of other medications     Hypertension     pt reports medication was started due to history of DM    Ill-defined condition 01/29/2018    Obesity BMI= 31.8    Mixed hyperlipidemia 04/05/2010    pt reports medication was started due to history of DM    GEORGES (obstructive sleep apnea) 4/5/2010    Unspecified sleep apnea     O2 AT NIGHT 2L, CANNOT USE CPAP MASK      Past Surgical History:   Procedure Laterality Date    HX COLECTOMY  6/1997    RESECTION    HX KNEE ARTHROSCOPY Left 1971    HX KNEE ARTHROSCOPY Right 1991    HX LUMBAR LAMINECTOMY  2012    HX OTHER SURGICAL  1971    CIRCUMCISION, VASECTOMY    HX WISDOM TEETH EXTRACTION  1980      Prior to Admission medications    Medication Sig Start Date End Date Taking? Authorizing Provider   atorvastatin (LIPITOR) 10 mg tablet Take 1 Tab by mouth daily. Patient taking differently: Take 10 mg by mouth nightly. 7/12/17  Yes Ambrose Watson MD   metFORMIN (GLUCOPHAGE) 850 mg tablet TAKE 1 TABLET BY MOUTH BEFORE [facility] DINNER 7/12/17  Yes Ambrose Watson MD   quinapril (ACCUPRIL) 10 mg tablet Take 1 Tab by mouth nightly. Patient taking differently: Take 10 mg by mouth nightly. Taking differently takes in am 7/12/17  Yes Ambrose Watson MD   glipiZIDE (GLUCOTROL) 10 mg tablet Take 1 Tab by mouth two (2) times a day. 7/12/17  Yes Ambrose Watson MD   Oxygen-Air Delivery Systems 2 lpm at night 3/27/13  Yes mAbrose Watson MD   aspirin 81 mg tablet Take 81 mg by mouth. Yes Historical Provider   glucose blood VI test strips (ONETOUCH ULTRA TEST) strip Test Blood Sugar once daily    Dx. Code E11.49 1/9/18   Ambrose Watson MD   amLODIPine (NORVASC) 10 mg tablet Take 1 Tab by mouth daily. Patient taking differently: Take 10 mg by mouth every morning. 7/12/17   Ambrose Wtason MD   glucose blood VI test strips (ASCENSIA AUTODISC VI, ONE TOUCH ULTRA TEST VI) strip by Does Not Apply route daily. 4/3/14   Ambrose Watson MD     Current Outpatient Prescriptions   Medication Sig    atorvastatin (LIPITOR) 10 mg tablet Take 1 Tab by mouth daily. (Patient taking differently: Take 10 mg by mouth nightly.)    metFORMIN (GLUCOPHAGE) 850 mg tablet TAKE 1 TABLET BY MOUTH BEFORE BREAKFAST,LUNCH & DINNER    quinapril (ACCUPRIL) 10 mg tablet Take 1 Tab by mouth nightly.  (Patient taking differently: Take 10 mg by mouth nightly. Taking differently takes in am)    glipiZIDE (GLUCOTROL) 10 mg tablet Take 1 Tab by mouth two (2) times a day.  Oxygen-Air Delivery Systems 2 lpm at night    aspirin 81 mg tablet Take 81 mg by mouth.  glucose blood VI test strips (ONETOUCH ULTRA TEST) strip Test Blood Sugar once daily    Dx. Code E11.49    amLODIPine (NORVASC) 10 mg tablet Take 1 Tab by mouth daily. (Patient taking differently: Take 10 mg by mouth every morning.)    glucose blood VI test strips (ASCENSIA AUTODISC VI, ONE TOUCH ULTRA TEST VI) strip by Does Not Apply route daily. No current facility-administered medications for this encounter. Allergies   Allergen Reactions    Lortab [Hydrocodone-Acetaminophen] Other (comments)     Nightmares      Social History   Substance Use Topics    Smoking status: Former Smoker     Packs/day: 1.50     Years: 25.00     Quit date: 1/28/1982    Smokeless tobacco: Former User     Quit date: 11/9/2005    Alcohol use 4.2 oz/week     7 Glasses of wine per week      History   Drug Use No     Family History   Problem Relation Age of Onset    Stroke Mother     Heart Disease Father     Diabetes Father     Stroke Brother     Heart Disease Brother     Other Brother      AAA    Heart Disease Brother     Other Brother      AAA    Other Brother      anuerysms in legs and AAA, prediabetic    No Known Problems Daughter     No Known Problems Sister     Heart Disease Brother     Other Brother      AAA    Other Brother      Lyme disease?, prediabetic         Review of Systems    Patient denies difficulty swallowing, mouth sores, or loose teeth. Patient denies any recent dental procedures or any planned prior to surgery. Patient denies chest pain, tightness, pain radiating down left arm, palpitations. Denies dizziness, visual disturbances, or lightheadedness. Patient denies shortness of breath, wheezing, cough, fever, or chills.  Patient denies diarrhea, constipation, or abdominal pain. Patient denies urinary problems including dysuria, hesitancy, urgency, or incontinence. Denies skin breakdown, rashes, insect bites or open area. C/o right shoulder pain. Objective:   Patient Vitals for the past 24 hrs:   Temp Pulse Resp BP SpO2   18 1022 98.2 °F (36.8 °C) 68 17 162/71 96 %     Temp (24hrs), Av.2 °F (36.8 °C), Min:98.2 °F (36.8 °C), Max:98.2 °F (36.8 °C)    Body mass index is 31.19 kg/(m^2). Wt Readings from Last 1 Encounters:   18 95.8 kg (211 lb 3.2 oz)        Physical Exam:     General: Pleasant,  cooperative, no apparent distress, appears stated age. Eyes: Conjunctivae/corneas clear. EOMs intact. Nose: Nares normal.   Mouth/Throat: Lips, mucosa, and tongue normal. Full upper and lower dentures in place. Neck: Supple, symmetrical, trachea midline. Back: Symmetric   Lungs: Clear to auscultation bilaterally. Heart: Regular rate and rhythm, S1, S2 normal. No murmur, click, rub or gallop. Abdomen: Soft, non-tender. Bowel sounds normal. No distention. Musculoskeletal: Right shoulder ROM limited by discomfort. Extremities:  Extremities normal, atraumatic, no cyanosis or edema. Calves                                 supple, non tender to palpation. Pulses: 2+ and symmetric bilateral upper extremities. Cap. refill <2 seconds   Skin: Skin color, texture, turgor normal.  No rashes or lesions. Neurologic: CN II-XII grossly intact. Alert and oriented x3.     Labs:   Recent Results (from the past 72 hour(s))   CBC WITH AUTOMATED DIFF    Collection Time: 18 11:11 AM   Result Value Ref Range    WBC 10.1 4.1 - 11.1 K/uL    RBC 4.48 4.10 - 5.70 M/uL    HGB 10.8 (L) 12.1 - 17.0 g/dL    HCT 36.8 36.6 - 50.3 %    MCV 82.1 80.0 - 99.0 FL    MCH 24.1 (L) 26.0 - 34.0 PG    MCHC 29.3 (L) 30.0 - 36.5 g/dL    RDW 15.9 (H) 11.5 - 14.5 %    PLATELET 317 698 - 555 K/uL    MPV 11.3 8.9 - 12.9 FL    NRBC 0.0 0  WBC    ABSOLUTE NRBC 0.00 0.00 - 0.01 K/uL    NEUTROPHILS 62 32 - 75 %    LYMPHOCYTES 19 12 - 49 %    MONOCYTES 9 5 - 13 %    EOSINOPHILS 10 (H) 0 - 7 %    BASOPHILS 0 0 - 1 %    IMMATURE GRANULOCYTES 0 0.0 - 0.5 %    ABS. NEUTROPHILS 6.2 1.8 - 8.0 K/UL    ABS. LYMPHOCYTES 1.9 0.8 - 3.5 K/UL    ABS. MONOCYTES 0.9 0.0 - 1.0 K/UL    ABS. EOSINOPHILS 1.0 (H) 0.0 - 0.4 K/UL    ABS. BASOPHILS 0.0 0.0 - 0.1 K/UL    ABS. IMM. GRANS. 0.0 0.00 - 0.04 K/UL    DF AUTOMATED     PROTHROMBIN TIME + INR    Collection Time: 01/29/18 11:11 AM   Result Value Ref Range    INR 1.0 0.9 - 1.1      Prothrombin time 10.4 9.0 - 11.1 sec   PTT    Collection Time: 01/29/18 11:11 AM   Result Value Ref Range    aPTT 30.0 22.1 - 32.5 sec    aPTT, therapeutic range     58.0 - 77.0 SECS   URINALYSIS W/ REFLEX CULTURE    Collection Time: 01/29/18 11:11 AM   Result Value Ref Range    Color YELLOW/STRAW      Appearance CLEAR CLEAR      Specific gravity 1.013 1.003 - 1.030      pH (UA) 6.5 5.0 - 8.0      Protein 30 (A) NEG mg/dL    Glucose NEGATIVE  NEG mg/dL    Ketone NEGATIVE  NEG mg/dL    Bilirubin NEGATIVE  NEG      Blood NEGATIVE  NEG      Urobilinogen 0.2 0.2 - 1.0 EU/dL    Nitrites NEGATIVE  NEG      Leukocyte Esterase NEGATIVE  NEG      WBC 0-4 0 - 4 /hpf    RBC 0-5 0 - 5 /hpf    Epithelial cells FEW FEW /lpf    Bacteria NEGATIVE  NEG /hpf    UA:UC IF INDICATED CULTURE NOT INDICATED BY UA RESULT CNI      Hyaline cast 0-2 0 - 5 /lpf   TYPE & SCREEN    Collection Time: 01/29/18 11:11 AM   Result Value Ref Range    Crossmatch Expiration 02/12/2018     ABO/Rh(D) A POSITIVE     Antibody screen NEG        Assessment:     Primary osteoarthritis of right shoulder. History of GEORGES- cannot tolerate CPAP- wears O2 at 2L NC at night. Plan:     Scheduled for right total shoulder arthrplasty with biceps tenodesis and axillary nerve dissection. Labs done per surgeon's orders. Had CMP and HgbA1C on 01/15/2017 by PCP- HgbA1C = 6.3.  PAT labs unremarkable except Hgb slightly low (10.8). MRSA pending. EKG done 11/09/2017- no change in patient's cardiac status in interim. Monitor respiratory status post operatively.       Dina Brown NP

## 2018-01-29 NOTE — PERIOP NOTES
CBC with H & H of 10.8/36.8 faxed to Dr Bonnie Huang office  Hernesto Najera at Dr [place] Arm office notified of CBC results

## 2018-01-30 LAB
BACTERIA SPEC CULT: NORMAL
BACTERIA SPEC CULT: NORMAL
SERVICE CMNT-IMP: NORMAL

## 2018-02-07 ENCOUNTER — TELEPHONE (OUTPATIENT)
Dept: FAMILY MEDICINE CLINIC | Age: 78
End: 2018-02-07

## 2018-02-07 NOTE — TELEPHONE ENCOUNTER
Christopher Rondon with [facility] [phone] patient needs his low hemoglobin rechecked tomorrow. Message forwarded to Dr. Jeison Noriega for advisement.

## 2018-02-08 ENCOUNTER — OFFICE VISIT (OUTPATIENT)
Dept: FAMILY MEDICINE CLINIC | Age: 78
End: 2018-02-08

## 2018-02-08 ENCOUNTER — HOSPITAL ENCOUNTER (OUTPATIENT)
Dept: LAB | Age: 78
Discharge: HOME | End: 2018-02-08
Payer: MEDICARE

## 2018-02-08 VITALS
TEMPERATURE: 97.6 F | WEIGHT: 211 LBS | DIASTOLIC BLOOD PRESSURE: 64 MMHG | HEIGHT: 69 IN | OXYGEN SATURATION: 94 % | HEART RATE: 66 BPM | BODY MASS INDEX: 31.25 KG/M2 | SYSTOLIC BLOOD PRESSURE: 128 MMHG

## 2018-02-08 DIAGNOSIS — D50.9 HYPOCHROMIC ANEMIA: Primary | ICD-10-CM

## 2018-02-08 DIAGNOSIS — D53.9 DEFICIENCY ANEMIA: ICD-10-CM

## 2018-02-08 PROCEDURE — 85025 COMPLETE CBC W/AUTO DIFF WBC: CPT

## 2018-02-08 PROCEDURE — 82746 ASSAY OF FOLIC ACID SERUM: CPT

## 2018-02-08 PROCEDURE — 82607 VITAMIN B-12: CPT

## 2018-02-08 PROCEDURE — 83550 IRON BINDING TEST: CPT

## 2018-02-08 PROCEDURE — 82728 ASSAY OF FERRITIN: CPT

## 2018-02-08 PROCEDURE — 36415 COLL VENOUS BLD VENIPUNCTURE: CPT

## 2018-02-08 PROCEDURE — 85045 AUTOMATED RETICULOCYTE COUNT: CPT

## 2018-02-08 RX ORDER — FERROUS SULFATE 324(65)MG
324 TABLET, DELAYED RELEASE (ENTERIC COATED) ORAL
Qty: 100 TAB | Refills: 5 | Status: SHIPPED | OUTPATIENT
Start: 2018-02-08 | End: 2020-03-30

## 2018-02-08 NOTE — MR AVS SNAPSHOT
[address] [facility] 
 
 
 [address] [facility] [place] [address] [address] 
[phone] Patient: Kaylan Brito MRN: [id number] LSV:[id number] Visit Information Date & Time Provider Department Dept. Phone Encounter #  
 2/8/2018  8:00 AM Sakshi Broussard, [facility] [phone] [id number] Follow-up Instructions Return in about 4 weeks (around 3/8/2018). Your Appointments 5/14/2018  8:45 AM  
ROUTINE CARE with Sakshi Broussard MD  
[facility] [address] [facility]) Appt Note: f/u  
 [address] [facility] [place] [address] [address]  
[phone] [address] [place] [address] [address] Upcoming Health Maintenance Date Due MICROALBUMIN Q1 11/17/2017 FOOT EXAM Q1 3/16/2018 HEMOGLOBIN A1C Q6M 7/15/2018 LIPID PANEL Q1 10/25/2018 MEDICARE YEARLY EXAM 10/26/2018 EYE EXAM RETINAL OR DILATED Q1 11/15/2018 GLAUCOMA SCREENING Q2Y 11/15/2019 DTaP/Tdap/Td series (2 - Td) 12/10/2024 Allergies as of 2/8/2018  Review Complete On: 2/8/2018 By: Sakshi Broussard MD  
  
 Severity Noted Reaction Type Reactions Lortab [Hydrocodone-acetaminophen]  11/09/2017    Other (comments) Nightmares Current Immunizations  Reviewed on 10/25/2017 Name Date Influenza High Dose Vaccine PF 10/25/2017, 11/17/2016 Influenza Vaccine Split 10/11/2011 Pneumococcal Conjugate (PCV-13) 10/30/2017 Pneumococcal Polysaccharide (PPSV-23) 8/17/2016, 12/2/2013 Tdap 12/10/2014 Not reviewed this visit You Were Diagnosed With   
  
 Codes Comments Hypochromic anemia    -  Primary ICD-10-CM: D50.9 ICD-9-CM: 280.9 Deficiency anemia     ICD-10-CM: D53.9 ICD-9-CM: 907. 9 Vitals  BP Pulse Temp Height(growth percentile) Weight(growth percentile) SpO2  
 128/64 (BP 1 Location: Right arm, BP Patient Position: Sitting) 66 97.6 °F (36.4 °C) (Oral) 5' 9\" (1.753 m) 211 lb (95.7 kg) 94% BMI Smoking Status 31.16 kg/m2 Former Smoker Vitals History BMI and BSA Data Body Mass Index Body Surface Area  
 31.16 kg/m 2 2.16 m 2 Preferred Pharmacy Pharmacy Name Phone [facility] #[id number]- [address] [phone] Your Updated Medication List  
  
   
This list is accurate as of: 2/8/18  8:44 AM.  Always use your most recent med list. amLODIPine 10 mg tablet Commonly known as:  Ardeen Squire Take 1 Tab by mouth daily. aspirin 81 mg tablet Take 81 mg by mouth. atorvastatin 10 mg tablet Commonly known as:  LIPITOR Take 1 Tab by mouth daily. ferrous sulfate 324 mg (65 mg iron) tablet Take 1 Tab by mouth three (3) times daily (after meals). glipiZIDE 10 mg tablet Commonly known as:  Molly Patterson Take 1 Tab by mouth two (2) times a day. * glucose blood VI test strips strip Commonly known as:  ASCENSIA AUTODISC VI, ONE TOUCH ULTRA TEST VI  
by Does Not Apply route daily. * glucose blood VI test strips strip Commonly known as:  ONETOUCH ULTRA TEST Test Blood Sugar once daily  Dx. Code E11.49  
  
 metFORMIN 850 mg tablet Commonly known as:  GLUCOPHAGE  
TAKE 1 TABLET BY MOUTH BEFORE [facility] Oxygen-Air Delivery Systems 2 lpm at night  
  
 quinapril 10 mg tablet Commonly known as:  ACCUPRIL Take 1 Tab by mouth nightly. * Notice: This list has 2 medication(s) that are the same as other medications prescribed for you. Read the directions carefully, and ask your doctor or other care provider to review them with you. Prescriptions Sent to Pharmacy Refills  
 ferrous sulfate 324 mg (65 mg iron) tablet 5 Sig: Take 1 Tab by mouth three (3) times daily (after meals).   
 Class: Normal  
 Pharmacy: [facility]/pharmacy #[id number]- [address] Ph #: [phone] Route: Oral  
  
We Performed the Following AMB POC COMPLETE CBC, AUTOMATED [49754 CPT(R)] FERRITIN [80804 CPT(R)] FOLATE R4189792 CPT(R)] IRON PROFILE [id number] CPT(R)] RETICULOCYTE COUNT F2099974 CPT(R)] VITAMIN B12 [id number] CPT(R)] Follow-up Instructions Return in about 4 weeks (around 3/8/2018). Introducing [facility] & HEALTH SERVICES! Bharti Srivsatava introduces [employer] patient portal. Now you can access parts of your medical record, email your doctor's office, and request medication refills online. 1. In your internet browser, go to [email or web address] 2. Click on the First Time User? Click Here link in the Sign In box. You will see the New Member Sign Up page. 3. Enter your General Assembly Access Code exactly as it appears below. You will not need to use this code after youve completed the sign-up process. If you do not sign up before the expiration date, you must request a new code. · General [employer] Access Code: [email or web address] 
Expires: 4/15/2018  1:50 PM 
 
4. Enter the last four digits of your Social Security Number (xxxx) and Date of Birth (mm/dd/yyyy) as indicated and click Submit. You will be taken to the next sign-up page. 5. Create a General Assembly ID. This will be your General Assembly login ID and cannot be changed, so think of one that is secure and easy to remember. 6. Create a General Assembly password. You can change your password at any time. 7. Enter your Password Reset Question and Answer. This can be used at a later time if you forget your password. 8. Enter your e-mail address. You will receive e-mail notification when new information is available in [address] 9. Click Sign Up. You can now view and download portions of your medical record. 10. Click the Download Summary menu link to download a portable copy of your medical information.  
 
If you have questions, please visit the Frequently Asked Questions section of the GIVVER. Remember, [employer] is NOT to be used for urgent needs. For medical emergencies, dial 911. Now available from your iPhone and Android! Please provide this summary of care documentation to your next provider. Your primary care clinician is listed as Bell Summers. If you have any questions after today's visit, please call [phone].

## 2018-02-08 NOTE — PROGRESS NOTES
HISTORY OF PRESENT ILLNESS  Margoth Oseguera is a 68 y.o. male. noted to have lild anemia on preop testing,Hb 10. 8. Has remote hx of DARCIE. SCHEDULED FOR SHOULDER SURGERY NEXT WEEK  Follow-up   The history is provided by the patient. This is a new problem. The problem occurs daily. The problem has not changed since onset. Pertinent negatives include no chest pain. Review of Systems   Constitutional: Negative for fever and malaise/fatigue. Cardiovascular: Negative for chest pain and palpitations. Gastrointestinal: Negative for blood in stool, constipation, diarrhea and melena. Genitourinary: Negative for hematuria. Musculoskeletal: Positive for joint pain. Physical Exam   Constitutional: He appears well-developed and well-nourished. HENT:   Head: Normocephalic and atraumatic. Right Ear: External ear normal.   Left Ear: External ear normal.   Nose: Nose normal.   Mouth/Throat: Oropharynx is clear and moist.   Cardiovascular: Normal rate and regular rhythm. Abdominal: Soft. Bowel sounds are normal. He exhibits no distension and no mass. There is no tenderness. There is no rebound and no guarding. Genitourinary: Rectum normal. Rectal exam shows guaiac negative stool. ASSESSMENT and PLAN  Diagnoses and all orders for this visit:    1. Hypochromic anemia  -     RETICULOCYTE COUNT  -     FERRITIN  -     FOLATE  -     VITAMIN B12  -     IRON PROFILE  -     AMB POC COMPLETE CBC, AUTOMATED  -     CBC WITH AUTOMATED DIFF    2. Deficiency anemia  -     FOLATE  -     VITAMIN B12  -     ferrous sulfate 324 mg (65 mg iron) tablet; Take 1 Tab by mouth three (3) times daily (after meals). Hb 10.9,cleared for surgery      Follow-up Disposition:  Return in about 4 weeks (around 3/8/2018).

## 2018-02-09 LAB
BASOPHILS # BLD AUTO: 0 X10E3/UL (ref 0–0.2)
BASOPHILS NFR BLD AUTO: 0 %
EOSINOPHIL # BLD AUTO: 0.8 X10E3/UL (ref 0–0.4)
EOSINOPHIL NFR BLD AUTO: 9 %
ERYTHROCYTE [DISTWIDTH] IN BLOOD BY AUTOMATED COUNT: 15.8 % (ref 12.3–15.4)
FERRITIN SERPL-MCNC: 19 NG/ML (ref 30–400)
FOLATE SERPL-MCNC: 17.6 NG/ML
HCT VFR BLD AUTO: 36.2 % (ref 37.5–51)
HGB BLD-MCNC: 10.5 G/DL (ref 13–17.7)
IMM GRANULOCYTES # BLD: 0 X10E3/UL (ref 0–0.1)
IMM GRANULOCYTES NFR BLD: 0 %
IRON SATN MFR SERPL: 8 % (ref 15–55)
IRON SERPL-MCNC: 31 UG/DL (ref 38–169)
LYMPHOCYTES # BLD AUTO: 1.6 X10E3/UL (ref 0.7–3.1)
LYMPHOCYTES NFR BLD AUTO: 17 %
MCH RBC QN AUTO: 23.1 PG (ref 26.6–33)
MCHC RBC AUTO-ENTMCNC: 29 G/DL (ref 31.5–35.7)
MCV RBC AUTO: 80 FL (ref 79–97)
MONOCYTES # BLD AUTO: 0.7 X10E3/UL (ref 0.1–0.9)
MONOCYTES NFR BLD AUTO: 8 %
NEUTROPHILS # BLD AUTO: 6.1 X10E3/UL (ref 1.4–7)
NEUTROPHILS NFR BLD AUTO: 66 %
PLATELET # BLD AUTO: 248 X10E3/UL (ref 150–379)
RBC # BLD AUTO: 4.55 X10E6/UL (ref 4.14–5.8)
RETICS/RBC NFR AUTO: 1.9 % (ref 0.6–2.6)
TIBC SERPL-MCNC: 381 UG/DL (ref 250–450)
UIBC SERPL-MCNC: 350 UG/DL (ref 111–343)
VIT B12 SERPL-MCNC: <150 PG/ML (ref 232–1245)
WBC # BLD AUTO: 9.3 X10E3/UL (ref 3.4–10.8)

## 2018-05-14 ENCOUNTER — HOSPITAL ENCOUNTER (OUTPATIENT)
Dept: LAB | Age: 78
Discharge: HOME | End: 2018-05-14
Payer: MEDICARE

## 2018-05-14 ENCOUNTER — OFFICE VISIT (OUTPATIENT)
Dept: FAMILY MEDICINE CLINIC | Age: 78
End: 2018-05-14

## 2018-05-14 VITALS
HEART RATE: 57 BPM | SYSTOLIC BLOOD PRESSURE: 124 MMHG | TEMPERATURE: 97.9 F | RESPIRATION RATE: 20 BRPM | DIASTOLIC BLOOD PRESSURE: 62 MMHG | WEIGHT: 216 LBS | HEIGHT: 69 IN | OXYGEN SATURATION: 90 % | BODY MASS INDEX: 31.99 KG/M2

## 2018-05-14 DIAGNOSIS — I10 ESSENTIAL HYPERTENSION, BENIGN: ICD-10-CM

## 2018-05-14 DIAGNOSIS — E11.42 CONTROLLED TYPE 2 DIABETES MELLITUS WITH DIABETIC POLYNEUROPATHY, WITHOUT LONG-TERM CURRENT USE OF INSULIN (HCC): Primary | ICD-10-CM

## 2018-05-14 DIAGNOSIS — E78.2 MIXED HYPERLIPIDEMIA: ICD-10-CM

## 2018-05-14 DIAGNOSIS — D50.8 OTHER IRON DEFICIENCY ANEMIA: ICD-10-CM

## 2018-05-14 LAB
GLUCOSE POC: 152 MG/DL
HBA1C MFR BLD HPLC: 7.6 %

## 2018-05-14 PROCEDURE — 82043 UR ALBUMIN QUANTITATIVE: CPT

## 2018-05-14 PROCEDURE — 36415 COLL VENOUS BLD VENIPUNCTURE: CPT

## 2018-05-14 PROCEDURE — 80053 COMPREHEN METABOLIC PANEL: CPT

## 2018-05-14 PROCEDURE — 80061 LIPID PANEL: CPT

## 2018-05-14 NOTE — MR AVS SNAPSHOT
[facility] 
 
 
 [address] [facility] [place] [address] [id number] [phone] Patient: Jose David Linn MRN: [id number] YYC:[id number] Visit Information Date & Time Provider Department Dept. Phone Encounter #  
 5/14/2018  8:45 AM Garry Chacon Jono [phone] [id number] Follow-up Instructions Return in about 3 months (around 8/14/2018). Upcoming Health Maintenance Date Due MICROALBUMIN Q1 11/17/2017 FOOT EXAM Q1 3/16/2018 HEMOGLOBIN A1C Q6M 7/15/2018 Influenza Age 5 to Adult 8/1/2018 LIPID PANEL Q1 10/25/2018 MEDICARE YEARLY EXAM 10/26/2018 EYE EXAM RETINAL OR DILATED Q1 11/15/2018 GLAUCOMA SCREENING Q2Y 11/15/2019 DTaP/Tdap/Td series (2 - Td) 12/10/2024 Allergies as of 5/14/2018  Review Complete On: 5/14/2018 By: Angus Catalan MD  
  
 Severity Noted Reaction Type Reactions Lortab [Hydrocodone-acetaminophen]  11/09/2017    Other (comments) Nightmares Current Immunizations  Reviewed on 10/25/2017 Name Date Influenza High Dose Vaccine PF 10/25/2017, 11/17/2016 Influenza Vaccine Split 10/11/2011 Pneumococcal Conjugate (PCV-13) 10/30/2017 Pneumococcal Polysaccharide (PPSV-23) 8/17/2016, 12/2/2013 Tdap 12/10/2014 Not reviewed this visit You Were Diagnosed With   
  
 Codes Comments Controlled type 2 diabetes mellitus with diabetic polyneuropathy, without long-term current use of insulin (HCC)    -  Primary ICD-10-CM: E11.42 
ICD-9-CM: 250.60, 357.2 Essential hypertension, benign     ICD-10-CM: I10 
ICD-9-CM: 401.1 Mixed hyperlipidemia     ICD-10-CM: E78.2 ICD-9-CM: 272.2 Other iron deficiency anemia     ICD-10-CM: D50.8 ICD-9-CM: 280.8 Vitals BP Pulse Temp Resp Height(growth percentile) Weight(growth percentile)  124/62 (BP 1 Location: Right arm, BP Patient Position: Sitting) (!) 57 97.9 °F (36.6 °C) (Oral) 20 5' 9\" (1.753 m) 216 lb (98 kg) SpO2 BMI Smoking Status 90% 31.9 kg/m2 Former Smoker Vitals History BMI and BSA Data Body Mass Index Body Surface Area 31.9 kg/m 2 2.18 m 2 Preferred Pharmacy Pharmacy Name Phone [facility], [facility] [phone] Your Updated Medication List  
  
   
This list is accurate as of 5/14/18  9:12 AM.  Always use your most recent med list. amLODIPine 10 mg tablet Commonly known as:  Larimer Shield Take 1 Tab by mouth daily. aspirin 81 mg tablet Take 81 mg by mouth. atorvastatin 10 mg tablet Commonly known as:  LIPITOR Take 1 Tab by mouth daily. ferrous sulfate 324 mg (65 mg iron) tablet Take 1 Tab by mouth three (3) times daily (after meals). glipiZIDE 10 mg tablet Commonly known as:  Devin Picking Take 1 Tab by mouth two (2) times a day. * glucose blood VI test strips strip Commonly known as:  ASCENSIA AUTODISC VI, ONE TOUCH ULTRA TEST VI  
by Does Not Apply route daily. * glucose blood VI test strips strip Commonly known as:  ONETOUCH ULTRA TEST Test Blood Sugar once daily  Dx. Code E11.49  
  
 metFORMIN 850 mg tablet Commonly known as:  GLUCOPHAGE  
TAKE 1 TABLET BEFORE BREAKFAST, LUNCH, AND DINNER Oxygen-Air Delivery Systems 2 lpm at night  
  
 quinapril 10 mg tablet Commonly known as:  ACCUPRIL Take 1 Tab by mouth nightly. * Notice: This list has 2 medication(s) that are the same as other medications prescribed for you. Read the directions carefully, and ask your doctor or other care provider to review them with you. We Performed the Following AMB POC GLUCOSE, QUANTITATIVE, BLOOD [55060 CPT(R)] AMB POC HEMOGLOBIN A1C [46845 CPT(R)] LIPID PANEL [30853 CPT(R)] METABOLIC PANEL, COMPREHENSIVE [42393 CPT(R)] MICROALBUMIN, UR, RAND W/ MICROALB/CREAT RATIO [id number] CPT(R)] Follow-up Instructions Return in about 3 months (around 8/14/2018). Introducing [facility] & HEALTH SERVICES! [facility] introduces JumpSeat patient portal. Now you can access parts of your medical record, email your doctor's office, and request medication refills online. 1. In your internet browser, go to [email or web address] 2. Click on the First Time User? Click Here link in the Sign In box. You will see the New Member Sign Up page. 3. Enter your JumpSeat Access Code exactly as it appears below. You will not need to use this code after youve completed the sign-up process. If you do not sign up before the expiration date, you must request a new code. · JumpSeat Access Code: [email or web address] Expires: 8/12/2018  8:38 AM 
 
4. Enter the last four digits of your Social Security Number (xxxx) and Date of Birth (mm/dd/yyyy) as indicated and click Submit. You will be taken to the next sign-up page. 5. Create a JumpSeat ID. This will be your JumpSeat login ID and cannot be changed, so think of one that is secure and easy to remember. 6. Create a JumpSeat password. You can change your password at any time. 7. Enter your Password Reset Question and Answer. This can be used at a later time if you forget your password. 8. Enter your e-mail address. You will receive e-mail notification when new information is available in [address] 9. Click Sign Up. You can now view and download portions of your medical record. 10. Click the Download Summary menu link to download a portable copy of your medical information. If you have questions, please visit the Frequently Asked Questions section of the JumpSeat website. Remember, JumpSeat is NOT to be used for urgent needs. For medical emergencies, dial 911. Now available from your iPhone and Android! Please provide this summary of care documentation to your next provider. Your primary care clinician is listed as Rosa Webster. If you have any questions after today's visit, please call [phone].

## 2018-05-14 NOTE — PROGRESS NOTES
HISTORY OF PRESENT ILLNESS  Reinaldo Root is a 66 y.o. male. f/u hbp,dm,chol,veronica. Has cancelled rt shoulder surgery. Doing well  Diabetes   The history is provided by the patient. This is a chronic problem. The problem occurs daily. The problem has been gradually improving. Pertinent negatives include no chest pain and no headaches. Hypertension    This is a chronic problem. Pertinent negatives include no chest pain, no orthopnea, no palpitations, no malaise/fatigue, no headaches and no peripheral edema. Cholesterol Problem   Pertinent negatives include no chest pain and no headaches. Shoulder Pain    The history is provided by the patient. There was no injury mechanism. The pain is at a severity of 2/10. The pain is mild. The pain has been fluctuating since onset. Review of Systems   Constitutional: Negative for fever and malaise/fatigue. Cardiovascular: Negative for chest pain, palpitations and orthopnea. Genitourinary: Negative for dysuria and frequency. Musculoskeletal: Positive for joint pain. Negative for myalgias. Skin: Negative for rash. Neurological: Negative for headaches. Physical Exam   Constitutional: He is oriented to person, place, and time. He appears well-developed and well-nourished. HENT:   Head: Normocephalic and atraumatic. Right Ear: External ear normal.   Left Ear: External ear normal.   Nose: Nose normal.   Mouth/Throat: Oropharynx is clear and moist.   Eyes: Pupils are equal, round, and reactive to light. Neck: Normal range of motion. Neck supple. Cardiovascular: Normal rate and regular rhythm. Pulmonary/Chest: Effort normal and breath sounds normal.   Abdominal: Soft. Bowel sounds are normal.   Musculoskeletal:        Right shoulder: He exhibits decreased range of motion and decreased strength. He exhibits no tenderness and no bony tenderness. Neurological: He is alert and oriented to person, place, and time. Skin: Skin is warm and dry. Comprehensive Diabetic Foot Exam  was performed         ASSESSMENT and PLAN  Diagnoses and all orders for this visit:    1. Controlled type 2 diabetes mellitus with diabetic polyneuropathy, without long-term current use of insulin (HCC)  -     AMB POC HEMOGLOBIN A1C  -     AMB POC GLUCOSE, QUANTITATIVE, BLOOD  -     MICROALBUMIN, UR, RAND W/ MICROALB/CREAT RATIO    2. Essential hypertension, benign  -     METABOLIC PANEL, COMPREHENSIVE    3.  Mixed hyperlipidemia  -     LIPID PANEL    4. Other iron deficiency anemia    Doing well,continue current meds and treatments    Follow-up Disposition: Not on File

## 2018-05-15 LAB
ALBUMIN SERPL-MCNC: 4.6 G/DL (ref 3.5–4.8)
ALBUMIN/CREAT UR: 1706.3 MG/G CREAT (ref 0–30)
ALBUMIN/GLOB SERPL: 1.6 {RATIO} (ref 1.2–2.2)
ALP SERPL-CCNC: 58 IU/L (ref 39–117)
ALT SERPL-CCNC: 13 IU/L (ref 0–44)
AST SERPL-CCNC: 18 IU/L (ref 0–40)
BILIRUB SERPL-MCNC: 0.3 MG/DL (ref 0–1.2)
BUN SERPL-MCNC: 15 MG/DL (ref 8–27)
BUN/CREAT SERPL: 19 (ref 10–24)
CALCIUM SERPL-MCNC: 10 MG/DL (ref 8.6–10.2)
CHLORIDE SERPL-SCNC: 98 MMOL/L (ref 96–106)
CHOLEST SERPL-MCNC: 145 MG/DL (ref 100–199)
CO2 SERPL-SCNC: 26 MMOL/L (ref 18–29)
CREAT SERPL-MCNC: 0.79 MG/DL (ref 0.76–1.27)
CREAT UR-MCNC: 19.1 MG/DL
GFR SERPLBLD CREATININE-BSD FMLA CKD-EPI: 86 ML/MIN/1.73
GFR SERPLBLD CREATININE-BSD FMLA CKD-EPI: 99 ML/MIN/1.73
GLOBULIN SER CALC-MCNC: 2.8 G/DL (ref 1.5–4.5)
GLUCOSE SERPL-MCNC: 153 MG/DL (ref 65–99)
HDLC SERPL-MCNC: 46 MG/DL
INTERPRETATION, 910389: NORMAL
LDLC SERPL CALC-MCNC: 68 MG/DL (ref 0–99)
Lab: NORMAL
MICROALBUMIN UR-MCNC: 325.9 UG/ML
POTASSIUM SERPL-SCNC: 5.1 MMOL/L (ref 3.5–5.2)
PROT SERPL-MCNC: 7.4 G/DL (ref 6–8.5)
SODIUM SERPL-SCNC: 140 MMOL/L (ref 134–144)
TRIGL SERPL-MCNC: 154 MG/DL (ref 0–149)
VLDLC SERPL CALC-MCNC: 31 MG/DL (ref 5–40)

## 2018-06-22 DIAGNOSIS — E11.42 CONTROLLED TYPE 2 DIABETES MELLITUS WITH DIABETIC POLYNEUROPATHY, WITHOUT LONG-TERM CURRENT USE OF INSULIN (HCC): ICD-10-CM

## 2018-06-22 DIAGNOSIS — E11.42 TYPE 2 DIABETES MELLITUS WITH DIABETIC POLYNEUROPATHY, WITHOUT LONG-TERM CURRENT USE OF INSULIN (HCC): ICD-10-CM

## 2018-06-22 DIAGNOSIS — I10 ESSENTIAL HYPERTENSION, BENIGN: ICD-10-CM

## 2018-06-22 NOTE — TELEPHONE ENCOUNTER
----- Message from Galien Hands sent at 6/22/2018 10:59 AM EDT -----  Regarding: /refill  Pt will need a refill \"glipizide 10 mg\"  \" metformin 850 mg 3 times daily\" \"guinadril 10 mg 1 nightly\" \" amlodipine 10 mg 1 daily\"    Pt uses [id number] on file    Best contact is [phone]

## 2018-06-24 RX ORDER — GLIPIZIDE 10 MG/1
10 TABLET ORAL 2 TIMES DAILY
Qty: 180 TAB | Refills: 3 | Status: SHIPPED | OUTPATIENT
Start: 2018-06-24 | End: 2019-06-24 | Stop reason: SDUPTHER

## 2018-06-24 RX ORDER — QUINAPRIL 10 MG/1
10 TABLET ORAL
Qty: 90 TAB | Refills: 3 | Status: SHIPPED | OUTPATIENT
Start: 2018-06-24 | End: 2019-06-24 | Stop reason: SDUPTHER

## 2018-06-24 RX ORDER — METFORMIN HYDROCHLORIDE 850 MG/1
TABLET ORAL
Qty: 270 TAB | Refills: 2 | Status: SHIPPED | OUTPATIENT
Start: 2018-06-24 | End: 2019-04-01 | Stop reason: SDUPTHER

## 2018-06-24 RX ORDER — AMLODIPINE BESYLATE 10 MG/1
10 TABLET ORAL DAILY
Qty: 90 TAB | Refills: 3 | Status: SHIPPED | OUTPATIENT
Start: 2018-06-24 | End: 2019-06-24 | Stop reason: SDUPTHER

## 2018-06-28 DIAGNOSIS — E78.2 MIXED HYPERLIPIDEMIA: ICD-10-CM

## 2018-06-28 NOTE — TELEPHONE ENCOUNTER
Last Visit: 5/14/18-Sameer  Next Appt: 9/12/18-Sameer  Previous Refill Encounter: 7/12/17-90+3 refills    Requested Prescriptions     Pending Prescriptions Disp Refills    atorvastatin (LIPITOR) 10 mg tablet 90 Tab 3     Sig: Take 1 Tab by mouth daily.

## 2018-06-29 RX ORDER — ATORVASTATIN CALCIUM 10 MG/1
10 TABLET, FILM COATED ORAL
Qty: 90 TAB | Refills: 1 | Status: SHIPPED | OUTPATIENT
Start: 2018-06-29 | End: 2019-01-09 | Stop reason: SDUPTHER

## 2018-07-02 ENCOUNTER — TELEPHONE (OUTPATIENT)
Dept: FAMILY MEDICINE CLINIC | Age: 78
End: 2018-07-02

## 2018-09-12 ENCOUNTER — HOSPITAL ENCOUNTER (OUTPATIENT)
Dept: LAB | Age: 78
Discharge: HOME | End: 2018-09-12
Payer: MEDICARE

## 2018-09-12 ENCOUNTER — OFFICE VISIT (OUTPATIENT)
Dept: FAMILY MEDICINE CLINIC | Age: 78
End: 2018-09-12

## 2018-09-12 VITALS
BODY MASS INDEX: 32.88 KG/M2 | HEIGHT: 69 IN | HEART RATE: 90 BPM | WEIGHT: 222 LBS | OXYGEN SATURATION: 95 % | TEMPERATURE: 97.9 F | SYSTOLIC BLOOD PRESSURE: 150 MMHG | DIASTOLIC BLOOD PRESSURE: 82 MMHG | RESPIRATION RATE: 22 BRPM

## 2018-09-12 DIAGNOSIS — Z23 ENCOUNTER FOR IMMUNIZATION: ICD-10-CM

## 2018-09-12 DIAGNOSIS — E78.2 MIXED HYPERLIPIDEMIA: ICD-10-CM

## 2018-09-12 DIAGNOSIS — G47.33 OSA (OBSTRUCTIVE SLEEP APNEA): ICD-10-CM

## 2018-09-12 DIAGNOSIS — E11.42 CONTROLLED TYPE 2 DIABETES MELLITUS WITH DIABETIC POLYNEUROPATHY, WITHOUT LONG-TERM CURRENT USE OF INSULIN (HCC): Primary | ICD-10-CM

## 2018-09-12 DIAGNOSIS — I10 ESSENTIAL HYPERTENSION, BENIGN: ICD-10-CM

## 2018-09-12 PROBLEM — E11.21 TYPE 2 DIABETES WITH NEPHROPATHY (HCC): Status: ACTIVE | Noted: 2018-09-12

## 2018-09-12 LAB
GLUCOSE POC: 227 MG/DL
HBA1C MFR BLD HPLC: 8.8 %

## 2018-09-12 PROCEDURE — 85025 COMPLETE CBC W/AUTO DIFF WBC: CPT

## 2018-09-12 PROCEDURE — 82043 UR ALBUMIN QUANTITATIVE: CPT

## 2018-09-12 PROCEDURE — 80061 LIPID PANEL: CPT

## 2018-09-12 PROCEDURE — 80053 COMPREHEN METABOLIC PANEL: CPT

## 2018-09-12 PROCEDURE — 36415 COLL VENOUS BLD VENIPUNCTURE: CPT

## 2018-09-12 NOTE — PROGRESS NOTES
Chief Complaint Patient presents with  Diabetes F/U on diabetes.  Hypertension F/U on BP.  Cholesterol Problem F/U on cholesterol.  Immunization/Injection Pt getting flu shot.

## 2018-09-12 NOTE — PROGRESS NOTES
HISTORY OF PRESENT ILLNESS Miki Hung is a 66 y.o. male. f/u DM2 with polyneuropathy ,HBP,Chol. Doing well,cancelled surgery Diabetes The history is provided by the patient. This is a chronic problem. The problem has been gradually worsening. Pertinent negatives include no chest pain, no abdominal pain, no headaches and no shortness of breath. Hypertension This is a chronic problem. The problem has not changed since onset. Pertinent negatives include no chest pain, no orthopnea, no palpitations, no PND, no anxiety, no malaise/fatigue, no headaches, no peripheral edema, no dizziness, no shortness of breath and no vomiting. Cholesterol Problem The problem occurs daily. The problem has not changed since onset. Pertinent negatives include no chest pain, no abdominal pain, no headaches and no shortness of breath. Immunization/Injection This is a new problem. The problem occurs daily. The problem has not changed since onset. Pertinent negatives include no chest pain, no abdominal pain, no headaches and no shortness of breath. Review of Systems Constitutional: Negative for fever and malaise/fatigue. Respiratory: Negative for shortness of breath. Cardiovascular: Negative for chest pain, palpitations, orthopnea and PND. Gastrointestinal: Negative for abdominal pain and vomiting. Genitourinary: Negative for dysuria, frequency and urgency. Musculoskeletal: Negative for back pain, joint pain and myalgias. Neurological: Negative for dizziness and headaches. Psychiatric/Behavioral: Negative for depression. Physical Exam  
Constitutional: He is oriented to person, place, and time. He appears well-developed and well-nourished. HENT:  
Head: Normocephalic and atraumatic. Right Ear: External ear normal.  
Left Ear: External ear normal.  
Nose: Nose normal.  
Mouth/Throat: Oropharynx is clear and moist.  
Eyes: Conjunctivae are normal. Pupils are equal, round, and reactive to light. Neck: Normal range of motion. Neck supple. No tracheal deviation present. No thyromegaly present. Cardiovascular: Normal rate, regular rhythm, normal heart sounds and intact distal pulses. Pulmonary/Chest: Effort normal and breath sounds normal. No respiratory distress. He has no wheezes. Abdominal: Soft. Bowel sounds are normal. He exhibits no distension and no mass. There is no tenderness. There is no guarding. Musculoskeletal: Normal range of motion. Lymphadenopathy:  
  He has no cervical adenopathy. Neurological: He is alert and oriented to person, place, and time. Skin: Skin is warm and dry. Onychomychotic toenails trimmed with trimmers without complication Comprehensive Diabetic Foot Exam  was performed Psychiatric: He has a normal mood and affect. Vitals reviewed. ASSESSMENT and PLAN Diagnoses and all orders for this visit: 1. Controlled type 2 diabetes mellitus with diabetic polyneuropathy, without long-term current use of insulin (HCC),inadequately controlled,dietary measures wencouraged -     METABOLIC PANEL, COMPREHENSIVE 
-     AMB POC GLUCOSE, QUANTITATIVE, BLOOD 
-     AMB POC HEMOGLOBIN A1C 
-      DIABETES FOOT EXAM  [order this if you have performed a diabetic foot exam today) -     MICROALBUMIN, UR, RAND W/ MICROALB/CREAT RATIO 2. Essential hypertension, benign 
-     CBC WITH AUTOMATED DIFF 3. Mixed hyperlipidemia -     LIPID PANEL 
 
4. GEORGES (obstructive sleep apnea) 5. Encounter for immunization -     Influenza Vaccine Inactivated (IIV)(FLUAD), Subunit, Adjuvanted, IM, ([id number]) Follow-up Disposition: Not on File

## 2018-09-12 NOTE — MR AVS SNAPSHOT
[address] [facility] [address] 
 
 
 [address] [facility] [address] [address] 
[phone] Patient: Edilson Jade MRN: [id number] UBE:[birth year] Visit Information Date & Time Provider Department Dept. Phone Encounter #  
 9/12/2018  9:30 AM Johnnie Leal MD [facility] [phone] [id number] Follow-up Instructions Return in about 3 months (around 12/12/2018). Your Appointments 9/12/2018  9:30 AM  
ROUTINE CARE with Johnnie Leal MD  
[facility] [address] [facility]) Appt Note: 4m follow up  
 [address] [facility] [address] [address]  
[phone] [address] Upcoming Health Maintenance Date Due  
 FOOT EXAM Q1 3/16/2018 Influenza Age 5 to Adult 8/1/2018 MEDICARE YEARLY EXAM 10/26/2018 HEMOGLOBIN A1C Q6M 11/14/2018 EYE EXAM RETINAL OR DILATED Q1 11/15/2018 MICROALBUMIN Q1 5/14/2019 LIPID PANEL Q1 5/14/2019 GLAUCOMA SCREENING Q2Y 11/15/2019 DTaP/Tdap/Td series (2 - Td) 12/10/2024 Allergies as of 9/12/2018  Review Complete On: 9/12/2018 By: Johnnie Leal MD  
  
 Severity Noted Reaction Type Reactions Lortab [Hydrocodone-acetaminophen]  11/09/2017    Other (comments) Nightmares Current Immunizations  Reviewed on 10/25/2017 Name Date Influenza High Dose Vaccine PF 10/25/2017, 11/17/2016 Influenza Vaccine (Tri) Adjuvanted  Incomplete Influenza Vaccine Split 10/11/2011 Pneumococcal Conjugate (PCV-13) 10/30/2017 Pneumococcal Polysaccharide (PPSV-23) 8/17/2016, 12/2/2013 Tdap 12/10/2014 Not reviewed this visit You Were Diagnosed With   
  
 Codes Comments Controlled type 2 diabetes mellitus with diabetic polyneuropathy, without long-term current use of insulin (HCC)    -  Primary ICD-10-CM: E11.42 
ICD-9-CM: 250.60, 357.2 Essential hypertension, benign     ICD-10-CM: I10 
ICD-9-CM: 401.1 Mixed hyperlipidemia     ICD-10-CM: E78.2 ICD-9-CM: 272.2   
 GEORGES (obstructive sleep apnea)     ICD-10-CM: G47.33 
ICD-9-CM: 327.23 Encounter for immunization     ICD-10-CM: O44 ICD-9-CM: V03.89 Vitals BP Pulse Temp Resp Height(growth percentile) Weight(growth percentile) 150/82 (BP 1 Location: Left arm, BP Patient Position: Sitting) 90 97.9 °F (36.6 °C) (Oral) 22 5' 9\" (1.753 m) 222 lb (100.7 kg) SpO2 BMI Smoking Status 95% 32.78 kg/m2 Former Smoker Vitals History BMI and BSA Data Body Mass Index Body Surface Area 32.78 kg/m 2 2.21 m 2 Preferred Pharmacy Pharmacy Name Phone [id number] [id number] [address] [facility] [phone] Your Updated Medication List  
  
   
This list is accurate as of 9/12/18  9:07 AM.  Always use your most recent med list. amLODIPine 10 mg tablet Commonly known as:  Maral Colon Take 1 Tab by mouth daily. aspirin 81 mg tablet Take 81 mg by mouth. atorvastatin 10 mg tablet Commonly known as:  LIPITOR Take 1 Tab by mouth nightly. ferrous sulfate 324 mg (65 mg iron) tablet Take 1 Tab by mouth three (3) times daily (after meals). glipiZIDE 10 mg tablet Commonly known as:  Arvind Monrealey Take 1 Tab by mouth two (2) times a day. * glucose blood VI test strips strip Commonly known as:  ASCENSIA AUTODISC VI, ONE TOUCH ULTRA TEST VI  
by Does Not Apply route daily. * glucose blood VI test strips strip Commonly known as:  ONETOUCH ULTRA TEST Test Blood Sugar once daily  Dx. Code E11.49  
  
 metFORMIN 850 mg tablet Commonly known as:  GLUCOPHAGE  
TAKE 1 TABLET BEFORE BREAKFAST, LUNCH, AND DINNER Oxygen-Air Delivery Systems 2 lpm at night  
  
 quinapril 10 mg tablet Commonly known as:  ACCUPRIL Take 1 Tab by mouth nightly.  Taking differently takes in am  
  
 * Notice: This list has 2 medication(s) that are the same as other medications prescribed for you. Read the directions carefully, and ask your doctor or other care provider to review them with you. We Performed the Following AMB POC GLUCOSE, QUANTITATIVE, BLOOD [10463 CPT(R)] AMB POC HEMOGLOBIN A1C [79648 CPT(R)] CBC WITH AUTOMATED DIFF [79226 CPT(R)]  DIABETES FOOT EXAM [HM7 Custom] INFLUENZA VACCINE INACTIVATED (IIV), SUBUNIT, ADJUVANTED, IM [id number] CPT(R)] LIPID PANEL [29723 CPT(R)] METABOLIC PANEL, COMPREHENSIVE [74058 CPT(R)] MICROALBUMIN, UR, RAND W/ MICROALB/CREAT RATIO [id number] CPT(R)] Follow-up Instructions Return in about 3 months (around 12/12/2018). Introducing [facility]! [facility] introduces [employer] patient portal. Now you can access parts of your medical record, email your doctor's office, and request medication refills online. 1. In your internet browser, go to [email or web address] 2. Click on the First Time User? Click Here link in the Sign In box. You will see the New Member Sign Up page. 3. Enter your [employer] Access Code exactly as it appears below. You will not need to use this code after youve completed the sign-up process. If you do not sign up before the expiration date, you must request a new code. · Conf[employer] Access Code: [email or web address] Expires: 12/11/2018  8:44 AM 
 
4. Enter the last four digits of your Social Security Number (xxxx) and Date of Birth (mm/dd/yyyy) as indicated and click Submit. You will be taken to the next sign-up page. 5. Create a Confidex ID. This will be your Confidex login ID and cannot be changed, so think of one that is secure and easy to remember. 6. Create a [employer] password. You can change your password at any time. 7. Enter your Password Reset Question and Answer. This can be used at a later time if you forget your password. 8. Enter your e-mail address. You will receive e-mail notification when new information is available in [address] 9. Click Sign Up. You can now view and download portions of your medical record. 10. Click the Download Summary menu link to download a portable copy of your medical information. If you have questions, please visit the Frequently Asked Questions section of the [employer] website. Remember, [employer] is NOT to be used for urgent needs. For medical emergencies, dial 911. Now available from your iPhone and Android! Please provide this summary of care documentation to your next provider. Your primary care clinician is listed as Lesa Mitchell. If you have any questions after today's visit, please call [phone].

## 2018-09-13 LAB
ALBUMIN SERPL-MCNC: 4.7 G/DL (ref 3.5–4.8)
ALBUMIN/CREAT UR: 1708.5 MG/G CREAT (ref 0–30)
ALBUMIN/GLOB SERPL: 1.6 {RATIO} (ref 1.2–2.2)
ALP SERPL-CCNC: 66 IU/L (ref 39–117)
ALT SERPL-CCNC: 14 IU/L (ref 0–44)
AST SERPL-CCNC: 21 IU/L (ref 0–40)
BASOPHILS # BLD AUTO: 0 X10E3/UL (ref 0–0.2)
BASOPHILS NFR BLD AUTO: 1 %
BILIRUB SERPL-MCNC: 0.5 MG/DL (ref 0–1.2)
BUN SERPL-MCNC: 14 MG/DL (ref 8–27)
BUN/CREAT SERPL: 16 (ref 10–24)
CALCIUM SERPL-MCNC: 10.1 MG/DL (ref 8.6–10.2)
CHLORIDE SERPL-SCNC: 97 MMOL/L (ref 96–106)
CHOLEST SERPL-MCNC: 146 MG/DL (ref 100–199)
CO2 SERPL-SCNC: 20 MMOL/L (ref 20–29)
CREAT SERPL-MCNC: 0.86 MG/DL (ref 0.76–1.27)
CREAT UR-MCNC: 53.2 MG/DL
EOSINOPHIL # BLD AUTO: 0.6 X10E3/UL (ref 0–0.4)
EOSINOPHIL NFR BLD AUTO: 6 %
ERYTHROCYTE [DISTWIDTH] IN BLOOD BY AUTOMATED COUNT: 16.3 % (ref 12.3–15.4)
GLOBULIN SER CALC-MCNC: 3 G/DL (ref 1.5–4.5)
GLUCOSE SERPL-MCNC: 220 MG/DL (ref 65–99)
HCT VFR BLD AUTO: 37.3 % (ref 37.5–51)
HDLC SERPL-MCNC: 41 MG/DL
HGB BLD-MCNC: 12 G/DL (ref 13–17.7)
IMM GRANULOCYTES # BLD: 0 X10E3/UL (ref 0–0.1)
IMM GRANULOCYTES NFR BLD: 0 %
INTERPRETATION, 910389: NORMAL
LDLC SERPL CALC-MCNC: 70 MG/DL (ref 0–99)
LYMPHOCYTES # BLD AUTO: 2.1 X10E3/UL (ref 0.7–3.1)
LYMPHOCYTES NFR BLD AUTO: 24 %
Lab: NORMAL
Lab: NORMAL
MCH RBC QN AUTO: 25.9 PG (ref 26.6–33)
MCHC RBC AUTO-ENTMCNC: 32.2 G/DL (ref 31.5–35.7)
MCV RBC AUTO: 81 FL (ref 79–97)
MICROALBUMIN UR-MCNC: 908.9 UG/ML
MONOCYTES # BLD AUTO: 0.7 X10E3/UL (ref 0.1–0.9)
MONOCYTES NFR BLD AUTO: 8 %
NEUTROPHILS # BLD AUTO: 5.4 X10E3/UL (ref 1.4–7)
NEUTROPHILS NFR BLD AUTO: 61 %
PLATELET # BLD AUTO: 181 X10E3/UL (ref 150–379)
POTASSIUM SERPL-SCNC: 5.3 MMOL/L (ref 3.5–5.2)
PROT SERPL-MCNC: 7.7 G/DL (ref 6–8.5)
RBC # BLD AUTO: 4.63 X10E6/UL (ref 4.14–5.8)
SODIUM SERPL-SCNC: 139 MMOL/L (ref 134–144)
TRIGL SERPL-MCNC: 176 MG/DL (ref 0–149)
VLDLC SERPL CALC-MCNC: 35 MG/DL (ref 5–40)
WBC # BLD AUTO: 8.8 X10E3/UL (ref 3.4–10.8)

## 2018-09-24 DIAGNOSIS — E78.2 MIXED HYPERLIPIDEMIA: ICD-10-CM

## 2018-09-24 RX ORDER — ATORVASTATIN CALCIUM 10 MG/1
TABLET, FILM COATED ORAL
Qty: 90 TAB | Refills: 3 | Status: SHIPPED | OUTPATIENT
Start: 2018-09-24 | End: 2019-05-08

## 2019-01-09 ENCOUNTER — HOSPITAL ENCOUNTER (OUTPATIENT)
Dept: LAB | Age: 79
Discharge: HOME | End: 2019-01-09
Payer: MEDICARE

## 2019-01-09 ENCOUNTER — OFFICE VISIT (OUTPATIENT)
Dept: FAMILY MEDICINE CLINIC | Age: 79
End: 2019-01-09

## 2019-01-09 VITALS
RESPIRATION RATE: 18 BRPM | HEART RATE: 63 BPM | SYSTOLIC BLOOD PRESSURE: 126 MMHG | WEIGHT: 220 LBS | DIASTOLIC BLOOD PRESSURE: 62 MMHG | HEIGHT: 69 IN | TEMPERATURE: 97.5 F | BODY MASS INDEX: 32.58 KG/M2 | OXYGEN SATURATION: 90 %

## 2019-01-09 DIAGNOSIS — E78.2 MIXED HYPERLIPIDEMIA: ICD-10-CM

## 2019-01-09 DIAGNOSIS — I10 ESSENTIAL HYPERTENSION, BENIGN: ICD-10-CM

## 2019-01-09 DIAGNOSIS — Z00.00 MEDICARE ANNUAL WELLNESS VISIT, SUBSEQUENT: Primary | ICD-10-CM

## 2019-01-09 DIAGNOSIS — G47.33 OSA (OBSTRUCTIVE SLEEP APNEA): ICD-10-CM

## 2019-01-09 DIAGNOSIS — E11.42 CONTROLLED TYPE 2 DIABETES MELLITUS WITH DIABETIC POLYNEUROPATHY, WITHOUT LONG-TERM CURRENT USE OF INSULIN (HCC): ICD-10-CM

## 2019-01-09 LAB
GLUCOSE POC: 187 MG/DL
HBA1C MFR BLD HPLC: 8.7 %

## 2019-01-09 PROCEDURE — 80053 COMPREHEN METABOLIC PANEL: CPT

## 2019-01-09 PROCEDURE — 36415 COLL VENOUS BLD VENIPUNCTURE: CPT

## 2019-01-09 PROCEDURE — 80061 LIPID PANEL: CPT

## 2019-01-09 NOTE — PROGRESS NOTES
HISTORY OF PRESENT ILLNESS Paula Jarrett is a 66 y.o. male. f/u hbp,dm,chol,veronica,melyssa. Improving rt shoulder surgery. Doing well,uses O2 at night for MELYSSA Well Male The history is provided by the patient. This is a chronic problem. The problem occurs daily. The problem has not changed since onset. Pertinent negatives include no chest pain and no headaches. Diabetes The history is provided by the patient. This is a chronic problem. The problem occurs daily. The problem has not changed since onset. Pertinent negatives include no chest pain and no headaches. Hypertension This is a chronic problem. Pertinent negatives include no chest pain, no orthopnea, no palpitations, no malaise/fatigue, no headaches, no peripheral edema and no nausea. Cholesterol Problem The history is provided by the patient. This is a chronic problem. The problem occurs daily. The problem has not changed since onset. Pertinent negatives include no chest pain and no headaches. Review of Systems Constitutional: Negative for fever and malaise/fatigue. Cardiovascular: Negative for chest pain, palpitations and orthopnea. Gastrointestinal: Negative for blood in stool, heartburn and nausea. Genitourinary: Negative for dysuria and frequency. Musculoskeletal: Positive for joint pain. Negative for myalgias. Skin: Negative for rash. Neurological: Negative for headaches. Psychiatric/Behavioral: Negative for depression. Physical Exam  
Constitutional: He is oriented to person, place, and time. He appears well-developed and well-nourished. HENT:  
Head: Normocephalic and atraumatic. Right Ear: External ear normal.  
Left Ear: External ear normal.  
Nose: Nose normal.  
Mouth/Throat: Oropharynx is clear and moist.  
Eyes: Pupils are equal, round, and reactive to light. Neck: Normal range of motion. Neck supple. Cardiovascular: Normal rate and regular rhythm.   
Pulmonary/Chest: Effort normal and breath sounds normal.  
 Abdominal: Soft. Bowel sounds are normal.  
Musculoskeletal:  
     Right shoulder: He exhibits decreased range of motion and decreased strength. He exhibits no tenderness and no bony tenderness. Neurological: He is alert and oriented to person, place, and time. Skin: Skin is warm and dry. Comprehensive Diabetic Foot Exam  was performed Psychiatric: He has a normal mood and affect. His behavior is normal.  
 
 
ASSESSMENT and PLAN Diagnoses and all orders for this visit: 
 
1. Medicare annual wellness visit, subsequent 2. Controlled type 2 diabetes mellitus with diabetic polyneuropathy, without long-term current use of insulin ([facility] [address] -     METABOLIC PANEL, COMPREHENSIVE 
-     AMB POC GLUCOSE, QUANTITATIVE, BLOOD 
-     AMB POC HEMOGLOBIN A1C 
 
3. Mixed hyperlipidemia -     LIPID PANEL 4. Essential hypertension, benign 5. GEORGES (obstructive sleep apnea) Doing well,continue current meds and treatments Follow-up Disposition: 
Return in about 3 months (around 4/9/2019).

## 2019-01-09 NOTE — PROGRESS NOTES
This is the Subsequent Medicare Annual Wellness Exam, performed 12 months or more after the Initial AWV or the last Subsequent AWV I have reviewed the patient's medical history in detail and updated the computerized patient record. History Past Medical History:  
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   
 pt reports medication was started due to history of DM  Ill-defined condition 01/29/2018 Obesity BMI= 31.8  Mixed hyperlipidemia 04/05/2010  
 pt reports medication was started due to history of DM  
 GEORGES (obstructive sleep apnea) 4/5/2010  Unspecified sleep apnea O2 AT NIGHT 2L, CANNOT USE CPAP MASK Past Surgical History:  
Procedure Laterality Date  HX COLECTOMY  6/1997 RESECTION  
 HX KNEE ARTHROSCOPY Left 1971  HX KNEE ARTHROSCOPY Right 1991  
 HX LUMBAR LAMINECTOMY  2012 [address] [facility] Current Outpatient Medications Medication Sig Dispense Refill  atorvastatin (LIPITOR) 10 mg tablet TAKE 1 TABLET DAILY 90 Tab 3  
 quinapril (ACCUPRIL) 10 mg tablet Take 1 Tab by mouth nightly. Taking differently takes in am 90 Tab 3  
 metFORMIN (GLUCOPHAGE) 850 mg tablet TAKE 1 TABLET BEFORE BREAKFAST, LUNCH, AND DINNER 270 Tab 2  
 glipiZIDE (GLUCOTROL) 10 mg tablet Take 1 Tab by mouth two (2) times a day. 180 Tab 3  
 amLODIPine (NORVASC) 10 mg tablet Take 1 Tab by mouth daily. 90 Tab 3  
 ferrous sulfate 324 mg (65 mg iron) tablet Take 1 Tab by mouth three (3) times daily (after meals). 100 Tab 5  
 glucose blood VI test strips (ONETOUCH ULTRA TEST) strip Test Blood Sugar once daily Dx.  Code E11.49 50 Strip 11  
  glucose blood VI test strips (ASCENSIA AUTODISC VI, ONE TOUCH ULTRA TEST VI) strip by Does Not Apply route daily. 35 Strip 11  
 Oxygen-Air Delivery Systems 2 lpm at night 1 Each 0  
 aspirin 81 mg tablet Take 81 mg by mouth. Allergies Allergen Reactions  Lortab [Hydrocodone-Acetaminophen] Other (comments) Nightmares Family History Problem Relation Age of Onset  Stroke Mother  Heart Disease Father  Diabetes Father  Stroke Brother  Heart Disease Brother  Other Brother AAA  Heart Disease Brother  Other Brother AAA  Other Brother   
     anuerysms in legs and AAA, prediabetic  No Known Problems Daughter  No Known Problems Sister  Heart Disease Brother  Other Brother AAA  Other Brother Lyme disease?, prediabetic Social History Tobacco Use  Smoking status: Former Smoker Packs/day: 1.50 Years: 25.00 Pack years: 37.50 Last attempt to quit: 1982 Years since quittin.9  Smokeless tobacco: Former User Quit date: 2005 Substance Use Topics  Alcohol use: Yes Alcohol/week: 4.2 oz Types: 7 Glasses of wine per week Patient Active Problem List  
Diagnosis Code  Diverticular disease of colon K57.30  Colon cancer ([facility] C18.9  GEORGES (obstructive sleep apnea) G47.33  
 Mixed hyperlipidemia E78.2  Carotid stenosis I65.29  
 Encounter for long-term (current) use of other medications Z79.899  
 Diabetic retinopathy, background ([facility] Z93.5783  Spondylolisthesis of lumbar region M43.16  
 DM (diabetes mellitus) type II controlled, neurological manifestation ([facility] E11.49  
 Essential hypertension, benign I10  Type 2 diabetes with nephropathy (HCC) E11.21 Depression Risk Factor Screening: PHQ over the last two weeks 2019 Little interest or pleasure in doing things Not at all Feeling down, depressed, irritable, or hopeless Not at all Total Score PHQ 2 0 Alcohol Risk Factor Screening: You do not drink alcohol or very rarely. Functional Ability and Level of Safety:  
Hearing Loss The patient wears hearing aids. Activities of Daily Living The home contains: handrails and grab bars Patient does total self care Fall Risk Fall Risk Assessment, last 12 mths 1/9/2019 Able to walk? Yes Fall in past 12 months? No  
 
 
Abuse Screen Patient is not abused Cognitive Screening Evaluation of Cognitive Function: 
Has your family/caregiver stated any concerns about your memory: no 
Normal 
 
Patient Care Team  
Patient Care Team: 
Xiomara Holloway MD as PCP - General 
 
Assessment/Plan Education and counseling provided: 
Are appropriate based on today's review and evaluation Diagnoses and all orders for this visit: 
 
1. Medicare annual wellness visit, subsequent 2. Controlled type 2 diabetes mellitus with diabetic polyneuropathy, without long-term current use of insulin ([facility] [address] -     METABOLIC PANEL, COMPREHENSIVE 
-     AMB POC GLUCOSE, QUANTITATIVE, BLOOD 
-     AMB POC HEMOGLOBIN A1C 
 
3. Mixed hyperlipidemia -     LIPID PANEL 4. Essential hypertension, benign Health Maintenance Due Topic Date Due  Shingrix Vaccine Age 50> (1 of 2) 03/25/1990  MEDICARE YEARLY EXAM  10/26/2018

## 2019-01-09 NOTE — PROGRESS NOTES
Chief Complaint Patient presents with  Well Male   Pt getting annual medicare wellness exam.

## 2019-01-10 LAB
ALBUMIN SERPL-MCNC: 4.7 G/DL (ref 3.5–4.8)
ALBUMIN/GLOB SERPL: 1.6 {RATIO} (ref 1.2–2.2)
ALP SERPL-CCNC: 68 IU/L (ref 39–117)
ALT SERPL-CCNC: 17 IU/L (ref 0–44)
AST SERPL-CCNC: 21 IU/L (ref 0–40)
BILIRUB SERPL-MCNC: 0.5 MG/DL (ref 0–1.2)
BUN SERPL-MCNC: 14 MG/DL (ref 8–27)
BUN/CREAT SERPL: 18 (ref 10–24)
CALCIUM SERPL-MCNC: 9.9 MG/DL (ref 8.6–10.2)
CHLORIDE SERPL-SCNC: 98 MMOL/L (ref 96–106)
CHOLEST SERPL-MCNC: 140 MG/DL (ref 100–199)
CO2 SERPL-SCNC: 24 MMOL/L (ref 20–29)
CREAT SERPL-MCNC: 0.8 MG/DL (ref 0.76–1.27)
GLOBULIN SER CALC-MCNC: 3 G/DL (ref 1.5–4.5)
GLUCOSE SERPL-MCNC: 177 MG/DL (ref 65–99)
HDLC SERPL-MCNC: 40 MG/DL
INTERPRETATION, 910389: NORMAL
LDLC SERPL CALC-MCNC: 65 MG/DL (ref 0–99)
Lab: NORMAL
POTASSIUM SERPL-SCNC: 4.8 MMOL/L (ref 3.5–5.2)
PROT SERPL-MCNC: 7.7 G/DL (ref 6–8.5)
SODIUM SERPL-SCNC: 141 MMOL/L (ref 134–144)
TRIGL SERPL-MCNC: 176 MG/DL (ref 0–149)
VLDLC SERPL CALC-MCNC: 35 MG/DL (ref 5–40)

## 2019-04-01 DIAGNOSIS — E11.42 TYPE 2 DIABETES MELLITUS WITH DIABETIC POLYNEUROPATHY, WITHOUT LONG-TERM CURRENT USE OF INSULIN (HCC): ICD-10-CM

## 2019-04-01 RX ORDER — METFORMIN HYDROCHLORIDE 850 MG/1
TABLET ORAL
Qty: 270 TAB | Refills: 2 | Status: SHIPPED | OUTPATIENT
Start: 2019-04-01 | End: 2020-01-15 | Stop reason: SDUPTHER

## 2019-05-08 ENCOUNTER — HOSPITAL ENCOUNTER (OUTPATIENT)
Dept: LAB | Age: 79
Discharge: HOME | End: 2019-05-08
Payer: MEDICARE

## 2019-05-08 ENCOUNTER — OFFICE VISIT (OUTPATIENT)
Dept: FAMILY MEDICINE CLINIC | Age: 79
End: 2019-05-08

## 2019-05-08 VITALS
WEIGHT: 220.4 LBS | HEART RATE: 65 BPM | RESPIRATION RATE: 20 BRPM | BODY MASS INDEX: 32.64 KG/M2 | SYSTOLIC BLOOD PRESSURE: 132 MMHG | HEIGHT: 69 IN | TEMPERATURE: 97.8 F | OXYGEN SATURATION: 94 % | DIASTOLIC BLOOD PRESSURE: 76 MMHG

## 2019-05-08 DIAGNOSIS — E78.2 MIXED HYPERLIPIDEMIA: ICD-10-CM

## 2019-05-08 DIAGNOSIS — I10 ESSENTIAL HYPERTENSION, BENIGN: ICD-10-CM

## 2019-05-08 DIAGNOSIS — G47.33 OSA (OBSTRUCTIVE SLEEP APNEA): ICD-10-CM

## 2019-05-08 DIAGNOSIS — E11.42 TYPE 2 DIABETES MELLITUS WITH DIABETIC POLYNEUROPATHY, WITHOUT LONG-TERM CURRENT USE OF INSULIN (HCC): Primary | ICD-10-CM

## 2019-05-08 LAB
GLUCOSE POC: 190 MG/DL
HBA1C MFR BLD HPLC: 9.4 %

## 2019-05-08 PROCEDURE — 80053 COMPREHEN METABOLIC PANEL: CPT

## 2019-05-08 NOTE — PROGRESS NOTES
Chief Complaint Patient presents with  Diabetes F/U on diabetes.  Hypertension F/U on BP.  Cholesterol Problem F/U on cholesterol. 1. Have you been to the ER, urgent care clinic since your last visit? Hospitalized since your last visit? No 
 
2. Have you seen or consulted any other health care providers outside of the [address] [place] [address] since your last visit? Include any pap smears or colon screening.  No

## 2019-05-08 NOTE — PROGRESS NOTES
HISTORY OF PRESENT ILLNESS Pari Willingham is a 78 y.o. male. f/u DM2 with polyneuropathy ,HBP,Chol. Doing well,sugars up some Diabetes The history is provided by the patient. This is a chronic problem. The problem has been gradually worsening. Hypertension This is a chronic problem. The problem has not changed since onset. Pertinent negatives include no orthopnea, no palpitations, no PND, no anxiety, no malaise/fatigue, no neck pain, no peripheral edema, no dizziness and no vomiting. Cholesterol Problem The problem occurs daily. The problem has not changed since onset. Review of Systems Constitutional: Negative for fever and malaise/fatigue. Cardiovascular: Negative for palpitations, orthopnea and PND. Gastrointestinal: Negative for vomiting. Genitourinary: Negative for dysuria, frequency and urgency. Musculoskeletal: Negative for back pain, joint pain, myalgias and neck pain. Neurological: Negative for dizziness. Psychiatric/Behavioral: Negative for depression and memory loss. Physical Exam  
Constitutional: He is oriented to person, place, and time. He appears well-developed and well-nourished. HENT:  
Head: Normocephalic and atraumatic. Right Ear: External ear normal.  
Left Ear: External ear normal.  
Nose: Nose normal.  
Mouth/Throat: Oropharynx is clear and moist.  
Eyes: Pupils are equal, round, and reactive to light. Conjunctivae are normal.  
Neck: Normal range of motion. Neck supple. No tracheal deviation present. No thyromegaly present. Cardiovascular: Normal rate, regular rhythm, normal heart sounds and intact distal pulses. Pulmonary/Chest: Effort normal and breath sounds normal. No respiratory distress. He has no wheezes. Abdominal: Soft. Bowel sounds are normal. He exhibits no distension and no mass. There is no tenderness. There is no guarding. Musculoskeletal: Normal range of motion. Lymphadenopathy:  
  He has no cervical adenopathy. Neurological: He is alert and oriented to person, place, and time. Skin: Skin is warm and dry. Comprehensive Diabetic Foot Exam  was performed Psychiatric: He has a normal mood and affect. His behavior is normal.  
Vitals reviewed. Diagnoses and all orders for this visit: 
 
1. Type 2 diabetes mellitus with diabetic polyneuropathy, without long-term current use of insulin ([facility] [address] -     METABOLIC PANEL, COMPREHENSIVE 
-     AMB POC GLUCOSE, QUANTITATIVE, BLOOD 
-     AMB POC HEMOGLOBIN A1C 
 
2. Mixed hyperlipidemia 3. Essential hypertension, benign 4. GEORGES (obstructive sleep apnea) Follow-up and Dispositions · Return in about 3 months (around 8/8/2019). Follow-up and Dispositions · Return in about 3 months (around 8/8/2019).

## 2019-05-09 LAB
ALBUMIN SERPL-MCNC: 4.4 G/DL (ref 3.5–4.8)
ALBUMIN/GLOB SERPL: 1.5 {RATIO} (ref 1.2–2.2)
ALP SERPL-CCNC: 64 IU/L (ref 39–117)
ALT SERPL-CCNC: 16 IU/L (ref 0–44)
AST SERPL-CCNC: 21 IU/L (ref 0–40)
BILIRUB SERPL-MCNC: 0.4 MG/DL (ref 0–1.2)
BUN SERPL-MCNC: 17 MG/DL (ref 8–27)
BUN/CREAT SERPL: 19 (ref 10–24)
CALCIUM SERPL-MCNC: 10 MG/DL (ref 8.6–10.2)
CHLORIDE SERPL-SCNC: 98 MMOL/L (ref 96–106)
CO2 SERPL-SCNC: 23 MMOL/L (ref 20–29)
CREAT SERPL-MCNC: 0.88 MG/DL (ref 0.76–1.27)
GLOBULIN SER CALC-MCNC: 3 G/DL (ref 1.5–4.5)
GLUCOSE SERPL-MCNC: 177 MG/DL (ref 65–99)
POTASSIUM SERPL-SCNC: 5.6 MMOL/L (ref 3.5–5.2)
PROT SERPL-MCNC: 7.4 G/DL (ref 6–8.5)
SODIUM SERPL-SCNC: 138 MMOL/L (ref 134–144)

## 2019-06-24 DIAGNOSIS — I10 ESSENTIAL HYPERTENSION, BENIGN: ICD-10-CM

## 2019-06-24 DIAGNOSIS — E11.42 CONTROLLED TYPE 2 DIABETES MELLITUS WITH DIABETIC POLYNEUROPATHY, WITHOUT LONG-TERM CURRENT USE OF INSULIN (HCC): ICD-10-CM

## 2019-06-25 RX ORDER — GLIPIZIDE 10 MG/1
TABLET ORAL
Qty: 180 TAB | Refills: 3 | Status: SHIPPED | OUTPATIENT
Start: 2019-06-25 | End: 2020-01-15 | Stop reason: SDUPTHER

## 2019-06-25 RX ORDER — AMLODIPINE BESYLATE 10 MG/1
TABLET ORAL
Qty: 90 TAB | Refills: 3 | Status: SHIPPED | OUTPATIENT
Start: 2019-06-25 | End: 2020-01-15 | Stop reason: SDUPTHER

## 2019-06-25 RX ORDER — QUINAPRIL 10 MG/1
TABLET ORAL
Qty: 90 TAB | Refills: 3 | Status: SHIPPED | OUTPATIENT
Start: 2019-06-25 | End: 2020-01-15 | Stop reason: SDUPTHER

## 2019-09-09 ENCOUNTER — HOSPITAL ENCOUNTER (OUTPATIENT)
Dept: LAB | Age: 79
Discharge: HOME | End: 2019-09-09
Payer: MEDICARE

## 2019-09-09 ENCOUNTER — OFFICE VISIT (OUTPATIENT)
Dept: FAMILY MEDICINE CLINIC | Age: 79
End: 2019-09-09

## 2019-09-09 VITALS
HEART RATE: 71 BPM | BODY MASS INDEX: 32.76 KG/M2 | RESPIRATION RATE: 20 BRPM | SYSTOLIC BLOOD PRESSURE: 142 MMHG | DIASTOLIC BLOOD PRESSURE: 84 MMHG | OXYGEN SATURATION: 96 % | HEIGHT: 69 IN | TEMPERATURE: 98.2 F | WEIGHT: 221.2 LBS

## 2019-09-09 DIAGNOSIS — I10 ESSENTIAL HYPERTENSION, BENIGN: ICD-10-CM

## 2019-09-09 DIAGNOSIS — E78.2 MIXED HYPERLIPIDEMIA: ICD-10-CM

## 2019-09-09 DIAGNOSIS — E11.42 CONTROLLED TYPE 2 DIABETES MELLITUS WITH DIABETIC POLYNEUROPATHY, WITHOUT LONG-TERM CURRENT USE OF INSULIN (HCC): ICD-10-CM

## 2019-09-09 DIAGNOSIS — E11.40 CONTROLLED TYPE 2 DIABETES MELLITUS WITH DIABETIC NEUROPATHY (HCC): ICD-10-CM

## 2019-09-09 DIAGNOSIS — E11.42 TYPE 2 DIABETES MELLITUS WITH DIABETIC POLYNEUROPATHY, WITHOUT LONG-TERM CURRENT USE OF INSULIN (HCC): Primary | ICD-10-CM

## 2019-09-09 DIAGNOSIS — R35.1 NOCTURIA: ICD-10-CM

## 2019-09-09 LAB
GLUCOSE POC: 153 MG/DL
HBA1C MFR BLD HPLC: 8.3 %

## 2019-09-09 PROCEDURE — 36415 COLL VENOUS BLD VENIPUNCTURE: CPT

## 2019-09-09 PROCEDURE — 80053 COMPREHEN METABOLIC PANEL: CPT

## 2019-09-09 PROCEDURE — 85025 COMPLETE CBC W/AUTO DIFF WBC: CPT

## 2019-09-09 PROCEDURE — 84153 ASSAY OF PSA TOTAL: CPT

## 2019-09-09 NOTE — PROGRESS NOTES
HISTORY OF PRESENT ILLNESS  Mary Childress is a 78 y.o. male. f/u hbp,dm,chol,veronica. .Doing well  Diabetes   The history is provided by the patient. This is a chronic problem. The problem occurs daily. The problem has not changed since onset. Pertinent negatives include no chest pain and no headaches. Hypertension    This is a chronic problem. Pertinent negatives include no chest pain, no orthopnea, no palpitations, no malaise/fatigue, no blurred vision, no headaches, no peripheral edema, no nausea and no vomiting. Cholesterol Problem   The history is provided by the patient. This is a chronic problem. The problem occurs daily. The problem has not changed since onset. Pertinent negatives include no chest pain and no headaches. Review of Systems   Constitutional: Negative for fever and malaise/fatigue. Eyes: Negative for blurred vision. Cardiovascular: Negative for chest pain, palpitations and orthopnea. Gastrointestinal: Negative for nausea and vomiting. Genitourinary: Negative for dysuria and frequency. Musculoskeletal: Negative for joint pain and myalgias. Skin: Negative for rash. Neurological: Negative for headaches. Psychiatric/Behavioral: Negative for depression. The patient is not nervous/anxious. Physical Exam   Constitutional: He is oriented to person, place, and time. He appears well-developed and well-nourished. HENT:   Head: Normocephalic and atraumatic. Right Ear: External ear normal.   Left Ear: External ear normal.   Nose: Nose normal.   Mouth/Throat: Oropharynx is clear and moist.   Eyes: Pupils are equal, round, and reactive to light. Neck: Normal range of motion. Neck supple. Cardiovascular: Normal rate, regular rhythm and intact distal pulses. Pulmonary/Chest: Effort normal and breath sounds normal.   Abdominal: Soft. Bowel sounds are normal.   Musculoskeletal: Normal range of motion. Right shoulder: He exhibits decreased strength.  He exhibits no tenderness and no bony tenderness. Neurological: He is alert and oriented to person, place, and time. Skin: Skin is warm and dry. No rash noted. No erythema. Comprehensive Diabetic Foot Exam  was performed         ASSESSMENT and PLAN  Diagnoses and all orders for this visit:    1. Controlled type 2 diabetes mellitus with diabetic polyneuropathy, without long-term current use of insulin (HCC)  -     METABOLIC PANEL, COMPREHENSIVE  -     AMB POC GLUCOSE, QUANTITATIVE, BLOOD  -     AMB POC HEMOGLOBIN A1C    2. Essential hypertension, benign  -     METABOLIC PANEL, COMPREHENSIVE  -     CBC WITH AUTOMATED DIFF    3. Mixed hyperlipidemia    4. Nocturia    5. Controlled type 2 diabetes mellitus with diabetic neuropathy (HCC)  -     glucose blood VI test strips (ONETOUCH ULTRA TEST) strip; Test Blood Sugar once daily    Dx.  Code E11.49    Other orders  -     PSA, DIAGNOSTIC (PROSTATE SPECIFIC AG)    Doing well,continue current meds and treatments

## 2019-09-09 NOTE — PROGRESS NOTES
Chief Complaint   Patient presents with    Diabetes     F/U on diabetes.  Hypertension     F/U on BP.  Cholesterol Problem     F/U on cholesterol. 1. Have you been to the ER, urgent care clinic since your last visit? Hospitalized since your last visit? No    2. Have you seen or consulted any other health care providers outside of the [address] [place] [address] since your last visit? Include any pap smears or colon screening.  No

## 2019-09-10 LAB
ALBUMIN SERPL-MCNC: 4.7 G/DL (ref 3.5–4.8)
ALBUMIN/GLOB SERPL: 2 {RATIO} (ref 1.2–2.2)
ALP SERPL-CCNC: 61 IU/L (ref 39–117)
ALT SERPL-CCNC: 18 IU/L (ref 0–44)
AST SERPL-CCNC: 19 IU/L (ref 0–40)
BASOPHILS # BLD AUTO: 0 X10E3/UL (ref 0–0.2)
BASOPHILS NFR BLD AUTO: 1 %
BILIRUB SERPL-MCNC: 0.3 MG/DL (ref 0–1.2)
BUN SERPL-MCNC: 14 MG/DL (ref 8–27)
BUN/CREAT SERPL: 15 (ref 10–24)
CALCIUM SERPL-MCNC: 9.8 MG/DL (ref 8.6–10.2)
CHLORIDE SERPL-SCNC: 98 MMOL/L (ref 96–106)
CO2 SERPL-SCNC: 25 MMOL/L (ref 20–29)
CREAT SERPL-MCNC: 0.91 MG/DL (ref 0.76–1.27)
EOSINOPHIL # BLD AUTO: 0.7 X10E3/UL (ref 0–0.4)
EOSINOPHIL NFR BLD AUTO: 9 %
ERYTHROCYTE [DISTWIDTH] IN BLOOD BY AUTOMATED COUNT: 15.4 % (ref 12.3–15.4)
GLOBULIN SER CALC-MCNC: 2.3 G/DL (ref 1.5–4.5)
GLUCOSE SERPL-MCNC: 152 MG/DL (ref 65–99)
HCT VFR BLD AUTO: 37.4 % (ref 37.5–51)
HGB BLD-MCNC: 11.8 G/DL (ref 13–17.7)
IMM GRANULOCYTES # BLD AUTO: 0 X10E3/UL (ref 0–0.1)
IMM GRANULOCYTES NFR BLD AUTO: 0 %
LYMPHOCYTES # BLD AUTO: 1.8 X10E3/UL (ref 0.7–3.1)
LYMPHOCYTES NFR BLD AUTO: 22 %
MCH RBC QN AUTO: 25.1 PG (ref 26.6–33)
MCHC RBC AUTO-ENTMCNC: 31.6 G/DL (ref 31.5–35.7)
MCV RBC AUTO: 80 FL (ref 79–97)
MONOCYTES # BLD AUTO: 0.7 X10E3/UL (ref 0.1–0.9)
MONOCYTES NFR BLD AUTO: 8 %
NEUTROPHILS # BLD AUTO: 5 X10E3/UL (ref 1.4–7)
NEUTROPHILS NFR BLD AUTO: 60 %
PLATELET # BLD AUTO: 197 X10E3/UL (ref 150–450)
POTASSIUM SERPL-SCNC: 5.4 MMOL/L (ref 3.5–5.2)
PROT SERPL-MCNC: 7 G/DL (ref 6–8.5)
PSA SERPL-MCNC: 0.3 NG/ML (ref 0–4)
RBC # BLD AUTO: 4.7 X10E6/UL (ref 4.14–5.8)
SODIUM SERPL-SCNC: 140 MMOL/L (ref 134–144)
WBC # BLD AUTO: 8.3 X10E3/UL (ref 3.4–10.8)

## 2020-01-06 ENCOUNTER — HOSPITAL ENCOUNTER (OUTPATIENT)
Dept: LAB | Age: 80
Discharge: HOME | End: 2020-01-06
Payer: MEDICARE

## 2020-01-06 ENCOUNTER — OFFICE VISIT (OUTPATIENT)
Dept: FAMILY MEDICINE CLINIC | Age: 80
End: 2020-01-06

## 2020-01-06 VITALS
RESPIRATION RATE: 18 BRPM | WEIGHT: 214 LBS | HEART RATE: 65 BPM | TEMPERATURE: 97.7 F | OXYGEN SATURATION: 93 % | SYSTOLIC BLOOD PRESSURE: 142 MMHG | HEIGHT: 69 IN | DIASTOLIC BLOOD PRESSURE: 76 MMHG | BODY MASS INDEX: 31.7 KG/M2

## 2020-01-06 DIAGNOSIS — I10 ESSENTIAL HYPERTENSION, BENIGN: ICD-10-CM

## 2020-01-06 DIAGNOSIS — E11.42 TYPE 2 DIABETES MELLITUS WITH DIABETIC POLYNEUROPATHY, WITHOUT LONG-TERM CURRENT USE OF INSULIN (HCC): Primary | ICD-10-CM

## 2020-01-06 DIAGNOSIS — J20.9 ACUTE BRONCHITIS, UNSPECIFIED ORGANISM: ICD-10-CM

## 2020-01-06 DIAGNOSIS — D50.8 OTHER IRON DEFICIENCY ANEMIA: ICD-10-CM

## 2020-01-06 DIAGNOSIS — E78.2 MIXED HYPERLIPIDEMIA: ICD-10-CM

## 2020-01-06 LAB
GLUCOSE POC: 159 MG/DL
HBA1C MFR BLD HPLC: 7.3 %

## 2020-01-06 PROCEDURE — 36415 COLL VENOUS BLD VENIPUNCTURE: CPT

## 2020-01-06 PROCEDURE — 80061 LIPID PANEL: CPT

## 2020-01-06 PROCEDURE — 82043 UR ALBUMIN QUANTITATIVE: CPT

## 2020-01-06 PROCEDURE — 80053 COMPREHEN METABOLIC PANEL: CPT

## 2020-01-06 RX ORDER — GUAIFENESIN 600 MG/1
TABLET, EXTENDED RELEASE ORAL
COMMUNITY
Start: 2019-12-31 | End: 2020-09-21

## 2020-01-06 RX ORDER — DOXYCYCLINE 100 MG/1
CAPSULE ORAL
COMMUNITY
Start: 2019-12-31 | End: 2020-01-06

## 2020-01-06 RX ORDER — PREDNISONE 10 MG/1
10 TABLET ORAL
Qty: 7 TAB | Refills: 0 | Status: SHIPPED | OUTPATIENT
Start: 2020-01-06 | End: 2020-03-30 | Stop reason: ALTCHOICE

## 2020-01-06 NOTE — PROGRESS NOTES
Chief Complaint   Patient presents with    Well Male     Pt getting annual medicare wellness exam.   [facility] Follow Up     F/U from [facility] for pneumonia. 1. Have you been to the ER, urgent care clinic since your last visit? Hospitalized since your last visit? Yes, [facility] on 12-1-19.    2. Have you seen or consulted any other health care providers outside of the [address] [place] [address] since your last visit? Include any pap smears or colon screening. Yes, [facility] on 12-1-19.

## 2020-01-07 LAB
ALBUMIN SERPL-MCNC: 4.4 G/DL (ref 3.5–4.8)
ALBUMIN/CREAT UR: 1261.9 MG/G CREAT (ref 0–30)
ALBUMIN/GLOB SERPL: 1.6 {RATIO} (ref 1.2–2.2)
ALP SERPL-CCNC: 68 IU/L (ref 39–117)
ALT SERPL-CCNC: 15 IU/L (ref 0–44)
AST SERPL-CCNC: 18 IU/L (ref 0–40)
BILIRUB SERPL-MCNC: 0.3 MG/DL (ref 0–1.2)
BUN SERPL-MCNC: 16 MG/DL (ref 8–27)
BUN/CREAT SERPL: 21 (ref 10–24)
CALCIUM SERPL-MCNC: 9.9 MG/DL (ref 8.6–10.2)
CHLORIDE SERPL-SCNC: 101 MMOL/L (ref 96–106)
CHOLEST SERPL-MCNC: 179 MG/DL (ref 100–199)
CO2 SERPL-SCNC: 22 MMOL/L (ref 20–29)
CREAT SERPL-MCNC: 0.78 MG/DL (ref 0.76–1.27)
CREAT UR-MCNC: 71.3 MG/DL
GLOBULIN SER CALC-MCNC: 2.8 G/DL (ref 1.5–4.5)
GLUCOSE SERPL-MCNC: 149 MG/DL (ref 65–99)
HDLC SERPL-MCNC: 39 MG/DL
INTERPRETATION, 910389: NORMAL
LDLC SERPL CALC-MCNC: 108 MG/DL (ref 0–99)
Lab: NORMAL
Lab: NORMAL
MICROALBUMIN UR-MCNC: 899.7 UG/ML
POTASSIUM SERPL-SCNC: 4.9 MMOL/L (ref 3.5–5.2)
PROT SERPL-MCNC: 7.2 G/DL (ref 6–8.5)
SODIUM SERPL-SCNC: 140 MMOL/L (ref 134–144)
TRIGL SERPL-MCNC: 160 MG/DL (ref 0–149)
VLDLC SERPL CALC-MCNC: 32 MG/DL (ref 5–40)

## 2020-01-07 NOTE — PROGRESS NOTES
HISTORY OF PRESENT ILLNESS  Nain Schultz is a 78 y.o. male. f/u hbp,dm,chol,veronica. .Doing well,minor neuropathy c/o  Diabetes   The history is provided by the patient. This is a chronic problem. The problem occurs daily. The problem has been gradually improving. Pertinent negatives include no headaches and no shortness of breath. Hypertension    The history is provided by the patient. This is a chronic problem. Pertinent negatives include no orthopnea, no palpitations, no malaise/fatigue, no blurred vision, no headaches, no peripheral edema, no shortness of breath, no nausea and no vomiting. Cholesterol Problem   The history is provided by the patient. This is a chronic problem. The problem occurs daily. The problem has been gradually improving. Pertinent negatives include no headaches and no shortness of breath. Review of Systems   Constitutional: Negative for fever and malaise/fatigue. HENT: Negative for hearing loss. Eyes: Negative for blurred vision. Respiratory: Negative for cough and shortness of breath. Cardiovascular: Negative for palpitations and orthopnea. Gastrointestinal: Negative for nausea and vomiting. Genitourinary: Negative for dysuria and frequency. Musculoskeletal: Negative for joint pain and myalgias. Skin: Negative for rash. Neurological: Positive for tingling. Negative for headaches. Psychiatric/Behavioral: Negative for depression. The patient is not nervous/anxious. Physical Exam  Constitutional:       Appearance: He is well-developed. HENT:      Head: Normocephalic and atraumatic. Right Ear: External ear normal.      Left Ear: External ear normal.      Nose: Nose normal.   Eyes:      Pupils: Pupils are equal, round, and reactive to light. Neck:      Musculoskeletal: Normal range of motion and neck supple. Cardiovascular:      Rate and Rhythm: Normal rate and regular rhythm.    Pulmonary:      Effort: Pulmonary effort is normal.      Breath sounds: Normal breath sounds. Abdominal:      General: Bowel sounds are normal.      Palpations: Abdomen is soft. Musculoskeletal: Normal range of motion. Right shoulder: He exhibits decreased strength. He exhibits no tenderness and no bony tenderness. Skin:     General: Skin is warm and dry. Findings: No erythema or rash. Comments: Comprehensive Diabetic Foot Exam  was performed     Neurological:      General: No focal deficit present. Mental Status: He is alert and oriented to person, place, and time. ASSESSMENT and PLAN  Diagnoses and all orders for this visit:    1. Type 2 diabetes mellitus with diabetic polyneuropathy, without long-term current use of insulin ([facility])  -     METABOLIC PANEL, COMPREHENSIVE  -     AMB POC HEMOGLOBIN A1C  -     AMB POC GLUCOSE, QUANTITATIVE, BLOOD  -     MICROALBUMIN, UR, RAND W/ MICROALB/CREAT RATIO  -      DIABETES FOOT EXAM    2. Essential hypertension, benign    3. Mixed hyperlipidemia  -     LIPID PANEL    4. Other iron deficiency anemia    5. Acute bronchitis, unspecified organism  -     predniSONE (DELTASONE) 10 mg tablet; Take 10 mg by mouth daily (with breakfast). Doing well,continue current meds and treatments    Follow-up and Dispositions    · Return in about 3 months (around 4/6/2020).

## 2020-01-15 ENCOUNTER — CLINICAL SUPPORT (OUTPATIENT)
Dept: FAMILY MEDICINE CLINIC | Age: 80
End: 2020-01-15

## 2020-01-15 ENCOUNTER — TELEPHONE (OUTPATIENT)
Dept: FAMILY MEDICINE CLINIC | Age: 80
End: 2020-01-15

## 2020-01-15 DIAGNOSIS — E11.42 TYPE 2 DIABETES MELLITUS WITH DIABETIC POLYNEUROPATHY, WITHOUT LONG-TERM CURRENT USE OF INSULIN (HCC): ICD-10-CM

## 2020-01-15 DIAGNOSIS — G47.33 OSA (OBSTRUCTIVE SLEEP APNEA): ICD-10-CM

## 2020-01-15 DIAGNOSIS — E11.42 CONTROLLED TYPE 2 DIABETES MELLITUS WITH DIABETIC POLYNEUROPATHY, WITHOUT LONG-TERM CURRENT USE OF INSULIN (HCC): ICD-10-CM

## 2020-01-15 DIAGNOSIS — I10 ESSENTIAL HYPERTENSION, BENIGN: ICD-10-CM

## 2020-01-15 DIAGNOSIS — J42 CHRONIC BRONCHITIS, UNSPECIFIED CHRONIC BRONCHITIS TYPE (HCC): Primary | ICD-10-CM

## 2020-01-15 RX ORDER — AMLODIPINE BESYLATE 10 MG/1
TABLET ORAL
Qty: 90 TAB | Refills: 3 | Status: SHIPPED | OUTPATIENT
Start: 2020-01-15 | End: 2020-07-07

## 2020-01-15 RX ORDER — GLIPIZIDE 10 MG/1
TABLET ORAL
Qty: 180 TAB | Refills: 3 | Status: SHIPPED | OUTPATIENT
Start: 2020-01-15 | End: 2021-06-01 | Stop reason: SDUPTHER

## 2020-01-15 RX ORDER — METFORMIN HYDROCHLORIDE 850 MG/1
850 TABLET ORAL
Qty: 270 TAB | Refills: 3 | Status: SHIPPED | OUTPATIENT
Start: 2020-01-15 | End: 2020-09-21 | Stop reason: DRUGHIGH

## 2020-01-15 RX ORDER — QUINAPRIL 10 MG/1
TABLET ORAL
Qty: 90 TAB | Refills: 3 | Status: SHIPPED | OUTPATIENT
Start: 2020-01-15 | End: 2021-06-01 | Stop reason: SDUPTHER

## 2020-01-15 NOTE — PROGRESS NOTES
Chief Complaint   Patient presents with   Amado Charles is here to qualify for o2      Qualifying date 01/15/2020  o2 sats on room air 92%  o2 sats with exercise 87%

## 2020-03-30 ENCOUNTER — HOSPITAL ENCOUNTER (OUTPATIENT)
Dept: LAB | Age: 80
Discharge: HOME | End: 2020-03-30
Payer: MEDICARE

## 2020-03-30 ENCOUNTER — OFFICE VISIT (OUTPATIENT)
Dept: FAMILY MEDICINE CLINIC | Age: 80
End: 2020-03-30

## 2020-03-30 VITALS
DIASTOLIC BLOOD PRESSURE: 72 MMHG | OXYGEN SATURATION: 91 % | WEIGHT: 211.2 LBS | TEMPERATURE: 97.6 F | HEIGHT: 69 IN | RESPIRATION RATE: 16 BRPM | BODY MASS INDEX: 31.28 KG/M2 | SYSTOLIC BLOOD PRESSURE: 156 MMHG | HEART RATE: 59 BPM

## 2020-03-30 DIAGNOSIS — I50.31 ACUTE DIASTOLIC CHF (CONGESTIVE HEART FAILURE) (HCC): Primary | ICD-10-CM

## 2020-03-30 DIAGNOSIS — I10 ESSENTIAL HYPERTENSION, BENIGN: ICD-10-CM

## 2020-03-30 DIAGNOSIS — E11.42 TYPE 2 DIABETES MELLITUS WITH DIABETIC POLYNEUROPATHY, WITHOUT LONG-TERM CURRENT USE OF INSULIN (HCC): ICD-10-CM

## 2020-03-30 LAB — GLUCOSE POC: 111 MG/DL

## 2020-03-30 PROCEDURE — 80048 BASIC METABOLIC PNL TOTAL CA: CPT

## 2020-03-30 RX ORDER — BUMETANIDE 0.5 MG/1
TABLET ORAL
COMMUNITY
Start: 2020-03-15 | End: 2020-08-18 | Stop reason: SDUPTHER

## 2020-03-30 RX ORDER — FERROUS SULFATE 137(45) MG
TABLET, EXTENDED RELEASE ORAL
COMMUNITY
Start: 2020-03-15

## 2020-03-30 RX ORDER — ACETAMINOPHEN AND CODEINE PHOSPHATE 300; 30 MG/1; MG/1
TABLET ORAL
COMMUNITY
Start: 2020-02-04 | End: 2020-09-21

## 2020-03-30 NOTE — PROGRESS NOTES
HISTORY OF PRESENT ILLNESS  Armaan Peck is a [de-identified] y.o. male. recent discharge from [facility] for CHF with volume overload ejection fraction reportedly 50%,new rx is bumetidine. 5 mg daily,wt down 15 #the patient had been vacationing the weeks prior to admission and had been eating a poor diet. No chest pain  Or CAD,though records are pending  Hospital Follow Up   The history is provided by the patient. This is a new problem. The current episode started more than 2 days ago. The problem occurs daily. The problem has been rapidly improving. Associated symptoms include shortness of breath. Pertinent negatives include no chest pain. Shortness of Breath   The history is provided by the patient. This is a new problem. The problem occurs frequently. The problem has been resolved. Pertinent negatives include no fever, no PND, no orthopnea, no chest pain, no leg swelling and no claudication. Diabetes   The history is provided by the patient. This is a chronic problem. The problem occurs daily. The problem has not changed since onset. Associated symptoms include shortness of breath. Pertinent negatives include no chest pain. Review of Systems   Constitutional: Negative for fever and malaise/fatigue. HENT: Negative for hearing loss. Respiratory: Positive for shortness of breath. Cardiovascular: Negative for chest pain, palpitations, orthopnea, claudication, leg swelling and PND. Physical Exam  Constitutional:       Appearance: Normal appearance. HENT:      Head: Normocephalic and atraumatic. Right Ear: Tympanic membrane normal.      Left Ear: Tympanic membrane normal.      Nose: Nose normal.      Mouth/Throat:      Mouth: Mucous membranes are moist.   Neck:      Musculoskeletal: Normal range of motion and neck supple. Cardiovascular:      Rate and Rhythm: Normal rate and regular rhythm. Heart sounds: Normal heart sounds.    Pulmonary:      Effort: Pulmonary effort is normal.      Breath sounds: Normal breath sounds. Abdominal:      General: Abdomen is flat. Palpations: Abdomen is soft. Musculoskeletal:      Right lower leg: No edema. Left lower leg: No edema. Neurological:      Mental Status: He is alert. ASSESSMENT and PLAN  Diagnoses and all orders for this visit:    1. Acute diastolic CHF (congestive heart failure) (HCC),now compensated. Will get records from hospital and review  -     METABOLIC PANEL, BASIC    2. Type 2 diabetes mellitus with diabetic polyneuropathy, without long-term current use of insulin ([facility])  -     AMB POC GLUCOSE, QUANTITATIVE, BLOOD    3. Essential hypertension, benign  -     METABOLIC PANEL, BASIC      Follow-up and Dispositions    · Return in about 2 weeks (around 4/13/2020).

## 2020-03-30 NOTE — PROGRESS NOTES
Chief Complaint   Patient presents with   [facility] Follow Up     F/U from [facility].     1. Have you been to the ER, urgent care clinic since your last visit? Hospitalized since your last visit? Yes, [facility] on 3-14-20.    2. Have you seen or consulted any other health care providers outside of the [address] [place] [address] since your last visit? Include any pap smears or colon screening. Yes, [facility] on 3-14-20.

## 2020-03-31 LAB
BUN SERPL-MCNC: 21 MG/DL (ref 8–27)
BUN/CREAT SERPL: 22 (ref 10–24)
CALCIUM SERPL-MCNC: 10 MG/DL (ref 8.6–10.2)
CHLORIDE SERPL-SCNC: 101 MMOL/L (ref 96–106)
CO2 SERPL-SCNC: 24 MMOL/L (ref 20–29)
CREAT SERPL-MCNC: 0.95 MG/DL (ref 0.76–1.27)
GLUCOSE SERPL-MCNC: 114 MG/DL (ref 65–99)
POTASSIUM SERPL-SCNC: 5.4 MMOL/L (ref 3.5–5.2)
SODIUM SERPL-SCNC: 142 MMOL/L (ref 134–144)

## 2020-04-16 ENCOUNTER — LAB ONLY (OUTPATIENT)
Dept: FAMILY MEDICINE CLINIC | Age: 80
End: 2020-04-16

## 2020-04-16 DIAGNOSIS — I10 ESSENTIAL HYPERTENSION, BENIGN: ICD-10-CM

## 2020-04-16 DIAGNOSIS — G47.33 OSA (OBSTRUCTIVE SLEEP APNEA): ICD-10-CM

## 2020-04-16 DIAGNOSIS — E11.42 TYPE 2 DIABETES MELLITUS WITH DIABETIC POLYNEUROPATHY, WITHOUT LONG-TERM CURRENT USE OF INSULIN (HCC): Primary | ICD-10-CM

## 2020-04-16 DIAGNOSIS — E78.2 MIXED HYPERLIPIDEMIA: ICD-10-CM

## 2020-04-17 ENCOUNTER — HOSPITAL ENCOUNTER (OUTPATIENT)
Dept: LAB | Age: 80
Discharge: HOME | End: 2020-04-17
Payer: MEDICARE

## 2020-04-17 PROCEDURE — 85025 COMPLETE CBC W/AUTO DIFF WBC: CPT

## 2020-04-17 PROCEDURE — 80061 LIPID PANEL: CPT

## 2020-04-17 PROCEDURE — 83036 HEMOGLOBIN GLYCOSYLATED A1C: CPT

## 2020-04-17 PROCEDURE — 36415 COLL VENOUS BLD VENIPUNCTURE: CPT

## 2020-04-17 PROCEDURE — 80053 COMPREHEN METABOLIC PANEL: CPT

## 2020-04-18 LAB
ALBUMIN SERPL-MCNC: 4.2 G/DL (ref 3.7–4.7)
ALBUMIN/GLOB SERPL: 1.5 {RATIO} (ref 1.2–2.2)
ALP SERPL-CCNC: 59 IU/L (ref 39–117)
ALT SERPL-CCNC: 17 IU/L (ref 0–44)
AST SERPL-CCNC: 21 IU/L (ref 0–40)
BASOPHILS # BLD AUTO: 0 X10E3/UL (ref 0–0.2)
BASOPHILS NFR BLD AUTO: 1 %
BILIRUB SERPL-MCNC: 0.3 MG/DL (ref 0–1.2)
BUN SERPL-MCNC: 17 MG/DL (ref 8–27)
BUN/CREAT SERPL: 17 (ref 10–24)
CALCIUM SERPL-MCNC: 10.1 MG/DL (ref 8.6–10.2)
CHLORIDE SERPL-SCNC: 101 MMOL/L (ref 96–106)
CHOLEST SERPL-MCNC: 171 MG/DL (ref 100–199)
CO2 SERPL-SCNC: 25 MMOL/L (ref 20–29)
CREAT SERPL-MCNC: 0.98 MG/DL (ref 0.76–1.27)
EOSINOPHIL # BLD AUTO: 0.5 X10E3/UL (ref 0–0.4)
EOSINOPHIL NFR BLD AUTO: 6 %
ERYTHROCYTE [DISTWIDTH] IN BLOOD BY AUTOMATED COUNT: 15.6 % (ref 11.6–15.4)
EST. AVERAGE GLUCOSE BLD GHB EST-MCNC: 148 MG/DL
GLOBULIN SER CALC-MCNC: 2.8 G/DL (ref 1.5–4.5)
GLUCOSE SERPL-MCNC: 176 MG/DL (ref 65–99)
HBA1C MFR BLD: 6.8 % (ref 4.8–5.6)
HCT VFR BLD AUTO: 37.8 % (ref 37.5–51)
HDLC SERPL-MCNC: 35 MG/DL
HGB BLD-MCNC: 11.7 G/DL (ref 13–17.7)
IMM GRANULOCYTES # BLD AUTO: 0 X10E3/UL (ref 0–0.1)
IMM GRANULOCYTES NFR BLD AUTO: 0 %
INTERPRETATION, 910389: NORMAL
LDLC SERPL CALC-MCNC: 99 MG/DL (ref 0–99)
LYMPHOCYTES # BLD AUTO: 2 X10E3/UL (ref 0.7–3.1)
LYMPHOCYTES NFR BLD AUTO: 25 %
Lab: NORMAL
MCH RBC QN AUTO: 25.6 PG (ref 26.6–33)
MCHC RBC AUTO-ENTMCNC: 31 G/DL (ref 31.5–35.7)
MCV RBC AUTO: 83 FL (ref 79–97)
MONOCYTES # BLD AUTO: 0.6 X10E3/UL (ref 0.1–0.9)
MONOCYTES NFR BLD AUTO: 8 %
NEUTROPHILS # BLD AUTO: 4.7 X10E3/UL (ref 1.4–7)
NEUTROPHILS NFR BLD AUTO: 60 %
PLATELET # BLD AUTO: 169 X10E3/UL (ref 150–450)
POTASSIUM SERPL-SCNC: 5.3 MMOL/L (ref 3.5–5.2)
PROT SERPL-MCNC: 7 G/DL (ref 6–8.5)
RBC # BLD AUTO: 4.57 X10E6/UL (ref 4.14–5.8)
SODIUM SERPL-SCNC: 141 MMOL/L (ref 134–144)
TRIGL SERPL-MCNC: 186 MG/DL (ref 0–149)
VLDLC SERPL CALC-MCNC: 37 MG/DL (ref 5–40)
WBC # BLD AUTO: 7.9 X10E3/UL (ref 3.4–10.8)

## 2020-04-27 ENCOUNTER — TELEPHONE (OUTPATIENT)
Dept: FAMILY MEDICINE CLINIC | Age: 80
End: 2020-04-27

## 2020-04-27 NOTE — TELEPHONE ENCOUNTER
Called patient, left a message to call Asia Arana
Donna with [facility]       Need to know if titration study was done yet         [phone]  Ask to be transferred to  [phone]
Patient states that he is still using his oxygen
Admission

## 2020-05-20 ENCOUNTER — TELEPHONE (OUTPATIENT)
Dept: FAMILY MEDICINE CLINIC | Age: 80
End: 2020-05-20

## 2020-05-20 NOTE — TELEPHONE ENCOUNTER
Pls call patient     States he was told a sleep apnea test was being set up but he hasnt heard anything      Best number to reach him is [phone]

## 2020-05-22 DIAGNOSIS — G47.33 OSA (OBSTRUCTIVE SLEEP APNEA): Primary | ICD-10-CM

## 2020-06-08 ENCOUNTER — TELEPHONE (OUTPATIENT)
Dept: FAMILY MEDICINE CLINIC | Age: 80
End: 2020-06-08

## 2020-06-08 NOTE — TELEPHONE ENCOUNTER
Pt asked for an in office appt - states he needs blood work     When told we dont have a lab right now - \"he said well have someone call me when you do or I can find another doctor\"     He called from [phone]

## 2020-06-30 ENCOUNTER — TELEPHONE (OUTPATIENT)
Dept: FAMILY MEDICINE CLINIC | Age: 80
End: 2020-06-30

## 2020-06-30 NOTE — TELEPHONE ENCOUNTER
----- Message from Conor Fowler sent at 6/30/2020 11:04 AM EDT -----  Regarding: /Telephone  General Message/Vendor Calls    Caller's first and last name:      Reason for call: To schedule labs     Callback required yes/no and why:  Yes, to get labs scheduled.     Best contact number(s):  [phone]    Details to clarify the request:      Conor Fowler

## 2020-07-01 ENCOUNTER — TELEPHONE (OUTPATIENT)
Dept: CARDIOLOGY CLINIC | Age: 80
End: 2020-07-01

## 2020-07-01 NOTE — TELEPHONE ENCOUNTER
Patient would like to schedule with Dr Tanner Travis.    Ref by [facility] [phone]  Dx congestive heart failure, hypertension    Phone: [phone]

## 2020-07-07 ENCOUNTER — OFFICE VISIT (OUTPATIENT)
Dept: FAMILY MEDICINE CLINIC | Age: 80
End: 2020-07-07

## 2020-07-07 ENCOUNTER — HOSPITAL ENCOUNTER (OUTPATIENT)
Dept: LAB | Age: 80
Discharge: HOME | End: 2020-07-07
Payer: MEDICARE

## 2020-07-07 VITALS
BODY MASS INDEX: 30.9 KG/M2 | WEIGHT: 208.6 LBS | HEIGHT: 69 IN | DIASTOLIC BLOOD PRESSURE: 89 MMHG | RESPIRATION RATE: 24 BRPM | SYSTOLIC BLOOD PRESSURE: 192 MMHG | OXYGEN SATURATION: 92 % | TEMPERATURE: 97.9 F | HEART RATE: 60 BPM

## 2020-07-07 DIAGNOSIS — G47.33 OSA (OBSTRUCTIVE SLEEP APNEA): ICD-10-CM

## 2020-07-07 DIAGNOSIS — E11.42 TYPE 2 DIABETES MELLITUS WITH DIABETIC POLYNEUROPATHY, WITHOUT LONG-TERM CURRENT USE OF INSULIN (HCC): Primary | ICD-10-CM

## 2020-07-07 DIAGNOSIS — E11.21 TYPE 2 DIABETES WITH NEPHROPATHY (HCC): ICD-10-CM

## 2020-07-07 DIAGNOSIS — I10 ESSENTIAL HYPERTENSION, BENIGN: ICD-10-CM

## 2020-07-07 LAB
GLUCOSE POC: 66 MG/DL
HBA1C MFR BLD HPLC: 6.4 %

## 2020-07-07 PROCEDURE — 85025 COMPLETE CBC W/AUTO DIFF WBC: CPT

## 2020-07-07 PROCEDURE — 80053 COMPREHEN METABOLIC PANEL: CPT

## 2020-07-07 PROCEDURE — 36415 COLL VENOUS BLD VENIPUNCTURE: CPT

## 2020-07-07 RX ORDER — AMLODIPINE BESYLATE 5 MG/1
5 TABLET ORAL DAILY
Qty: 30 TAB | Refills: 11 | Status: SHIPPED | OUTPATIENT
Start: 2020-07-07 | End: 2020-07-09 | Stop reason: SDUPTHER

## 2020-07-07 NOTE — PROGRESS NOTES
Chief Complaint   Patient presents with    Diabetes     follow up    Hypertension     follow up, patient states that it ios running high    Cholesterol Problem     follow up     1. Have you been to the ER, urgent care clinic since your last visit? Hospitalized since your last visit?no    2. Have you seen or consulted any other health care providers outside of the [address] [place] [address] since your last visit? Include any pap smears or colon screening.  no

## 2020-07-07 NOTE — PROGRESS NOTES
HISTORY OF PRESENT ILLNESS  Carter Gusman is a [de-identified] y.o. male. f/u hbp,dm,chol,veronica. .BP has been up since amlodipine haltedDoing well otherwise,minor neuropathy c/o  Diabetes   The history is provided by the patient. This is a chronic problem. The problem occurs daily. The problem has been gradually improving. Pertinent negatives include no headaches and no shortness of breath. Hypertension    The history is provided by the patient. This is a chronic problem. The problem has been gradually worsening. Pertinent negatives include no orthopnea, no palpitations, no malaise/fatigue, no blurred vision, no headaches, no peripheral edema, no shortness of breath, no nausea and no vomiting. Cholesterol Problem   The history is provided by the patient. This is a chronic problem. The problem occurs daily. The problem has been gradually improving. Pertinent negatives include no headaches and no shortness of breath. Review of Systems   Constitutional: Negative for fever and malaise/fatigue. HENT: Negative for hearing loss. Eyes: Negative for blurred vision and double vision. Respiratory: Negative for cough and shortness of breath. Cardiovascular: Negative for palpitations and orthopnea. Gastrointestinal: Negative for nausea and vomiting. Genitourinary: Negative for dysuria and frequency. Musculoskeletal: Negative for joint pain and myalgias. Skin: Negative for rash. Neurological: Positive for tingling. Negative for headaches. Psychiatric/Behavioral: Negative for depression. The patient is not nervous/anxious. Physical Exam  Constitutional:       Appearance: He is well-developed and normal weight. HENT:      Head: Normocephalic and atraumatic. Right Ear: External ear normal.      Left Ear: External ear normal.      Nose: Nose normal.   Eyes:      Pupils: Pupils are equal, round, and reactive to light. Neck:      Musculoskeletal: Normal range of motion and neck supple.    Cardiovascular: Rate and Rhythm: Normal rate and regular rhythm. Pulses: Normal pulses. Heart sounds: Normal heart sounds. Pulmonary:      Effort: Pulmonary effort is normal.      Breath sounds: Normal breath sounds. Abdominal:      General: Bowel sounds are normal.      Palpations: Abdomen is soft. Musculoskeletal: Normal range of motion. Right shoulder: He exhibits decreased strength. He exhibits no tenderness and no bony tenderness. Skin:     General: Skin is warm and dry. Findings: No erythema or rash. Comments: Comprehensive Diabetic Foot Exam  was performed     Neurological:      General: No focal deficit present. Mental Status: He is alert and oriented to person, place, and time. Psychiatric:         Mood and Affect: Mood normal.         ASSESSMENT and PLAN  Diagnoses and all orders for this visit:    1. Type 2 diabetes mellitus with diabetic polyneuropathy, without long-term current use of insulin ([facility])  -     METABOLIC PANEL, COMPREHENSIVE  -     AMB POC GLUCOSE, QUANTITATIVE, BLOOD  -     AMB POC HEMOGLOBIN A1C  -     glucose blood VI test strips (OneTouch Ultra Test) strip; Test Blood Sugar once daily    Dx. Code E11.49    2. Type 2 diabetes with nephropathy ([facility]    3. Essential hypertension, benign  -     METABOLIC PANEL, COMPREHENSIVE  -     CBC WITH AUTOMATED DIFF    4.  GEORGES (obstructive sleep apnea)    Doing well,continue current meds and treatments

## 2020-07-08 LAB
ALBUMIN SERPL-MCNC: 4.8 G/DL (ref 3.7–4.7)
ALBUMIN/GLOB SERPL: 2 {RATIO} (ref 1.2–2.2)
ALP SERPL-CCNC: 61 IU/L (ref 39–117)
ALT SERPL-CCNC: 12 IU/L (ref 0–44)
AST SERPL-CCNC: 17 IU/L (ref 0–40)
BASOPHILS # BLD AUTO: 0 X10E3/UL (ref 0–0.2)
BASOPHILS NFR BLD AUTO: 0 %
BILIRUB SERPL-MCNC: 0.5 MG/DL (ref 0–1.2)
BUN SERPL-MCNC: 15 MG/DL (ref 8–27)
BUN/CREAT SERPL: 16 (ref 10–24)
CALCIUM SERPL-MCNC: 9.9 MG/DL (ref 8.6–10.2)
CHLORIDE SERPL-SCNC: 101 MMOL/L (ref 96–106)
CO2 SERPL-SCNC: 25 MMOL/L (ref 20–29)
CREAT SERPL-MCNC: 0.93 MG/DL (ref 0.76–1.27)
EOSINOPHIL # BLD AUTO: 0.3 X10E3/UL (ref 0–0.4)
EOSINOPHIL NFR BLD AUTO: 3 %
ERYTHROCYTE [DISTWIDTH] IN BLOOD BY AUTOMATED COUNT: 15.2 % (ref 11.6–15.4)
GLOBULIN SER CALC-MCNC: 2.4 G/DL (ref 1.5–4.5)
GLUCOSE SERPL-MCNC: 66 MG/DL (ref 65–99)
HCT VFR BLD AUTO: 38.2 % (ref 37.5–51)
HGB BLD-MCNC: 11.4 G/DL (ref 13–17.7)
IMM GRANULOCYTES # BLD AUTO: 0 X10E3/UL (ref 0–0.1)
IMM GRANULOCYTES NFR BLD AUTO: 0 %
LYMPHOCYTES # BLD AUTO: 2.2 X10E3/UL (ref 0.7–3.1)
LYMPHOCYTES NFR BLD AUTO: 21 %
MCH RBC QN AUTO: 24.3 PG (ref 26.6–33)
MCHC RBC AUTO-ENTMCNC: 29.8 G/DL (ref 31.5–35.7)
MCV RBC AUTO: 81 FL (ref 79–97)
MONOCYTES # BLD AUTO: 0.9 X10E3/UL (ref 0.1–0.9)
MONOCYTES NFR BLD AUTO: 8 %
NEUTROPHILS # BLD AUTO: 6.9 X10E3/UL (ref 1.4–7)
NEUTROPHILS NFR BLD AUTO: 68 %
PLATELET # BLD AUTO: 231 X10E3/UL (ref 150–450)
POTASSIUM SERPL-SCNC: 5.1 MMOL/L (ref 3.5–5.2)
PROT SERPL-MCNC: 7.2 G/DL (ref 6–8.5)
RBC # BLD AUTO: 4.7 X10E6/UL (ref 4.14–5.8)
SODIUM SERPL-SCNC: 144 MMOL/L (ref 134–144)
WBC # BLD AUTO: 10.3 X10E3/UL (ref 3.4–10.8)

## 2020-07-09 ENCOUNTER — OFFICE VISIT (OUTPATIENT)
Dept: CARDIOLOGY CLINIC | Age: 80
End: 2020-07-09

## 2020-07-09 VITALS
SYSTOLIC BLOOD PRESSURE: 160 MMHG | HEIGHT: 69 IN | HEART RATE: 56 BPM | OXYGEN SATURATION: 97 % | WEIGHT: 208 LBS | RESPIRATION RATE: 16 BRPM | BODY MASS INDEX: 30.81 KG/M2 | DIASTOLIC BLOOD PRESSURE: 68 MMHG

## 2020-07-09 DIAGNOSIS — I10 ESSENTIAL HYPERTENSION, BENIGN: ICD-10-CM

## 2020-07-09 DIAGNOSIS — R94.31 EKG ABNORMALITY: Primary | ICD-10-CM

## 2020-07-09 RX ORDER — AMLODIPINE BESYLATE 10 MG/1
10 TABLET ORAL DAILY
Qty: 30 TAB | Refills: 12 | Status: SHIPPED | OUTPATIENT
Start: 2020-07-09 | End: 2021-09-17

## 2020-07-09 NOTE — PROGRESS NOTES
Identified pt with two pt identifiers(name and ). Reviewed record in preparation for visit and have obtained necessary documentation. Chief Complaint   Patient presents with    Establish Care    Hypertension    CHF      Vitals:    20 1424 20 1439   BP: 160/78 160/68   Pulse: (!) 56    Resp: 16    SpO2: 97%    Weight: 208 lb (94.3 kg)    Height: 5' 9\" (1.753 m)    PainSc:   0 - No pain        Health Maintenance Review: Patient reminded of \"due or due soon\" health maintenance. I have asked the patient to contact his/her primary care provider (PCP) for follow-up on his/her health maintenance. Coordination of Care Questionnaire:  :   1) Have you been to an emergency room, urgent care, or hospitalized since your last visit? If yes, where when, and reason for visit? yes   - [facility] ([place]) last week for elevated BP, EKG performed      2. Have seen or consulted any other health care provider since your last visit? If yes, where when, and reason for visit? NO      Patient is accompanied by self I have received verbal consent from Carter Gusman to discuss any/all medical information while they are present in the room.

## 2020-07-09 NOTE — PROGRESS NOTES
Diogenes Mane MD. [facility]              Patient: Cecelia Tran  : 1940      Today's Date: 2020            HISTORY OF PRESENT ILLNESS:     History of Present Illness:  Referred by Andriy Barron for HTN     He was doing well on meds and he thought PCP wanted him to stop medicines so he stopped Norvasc in November - had SOB and was at [address] with CHF in March. Recently BP at home was 200/100 so went to [facility] and was referred here. He was doing well until PNA in December. He is doing better past few days since resuming his BP meds. No CP (only had CP in setting PNA months ago). Breathing is better. Mows lawn with a push mower. PAST MEDICAL HISTORY:     Past Medical History:   Diagnosis Date    Arthritis     Colon cancer ([address]     Diabetes ([address]     Diverticular disease of colon 2010    DM (diabetes mellitus) type II controlled, neurological manifestation ([address] 2014    Encounter for long-term (current) use of other medications     Hypertension     pt reports medication was started due to history of DM    Ill-defined condition 2018    Obesity BMI= 31.8    Mixed hyperlipidemia 2010    pt reports medication was started due to history of DM    GEORGES (obstructive sleep apnea) 2010    Unspecified sleep apnea     O2 AT NIGHT 2L, CANNOT USE CPAP MASK       Past Surgical History:   Procedure Laterality Date    HX COLECTOMY  1997    RESECTION    HX KNEE ARTHROSCOPY Left     HX KNEE ARTHROSCOPY Right     HX LUMBAR LAMINECTOMY      HX OTHER SURGICAL  1971    CIRCUMCISION, VASECTOMY    HX WISDOM TEETH EXTRACTION             MEDICATIONS:     Current Outpatient Medications   Medication Sig Dispense Refill    amLODIPine (NORVASC) 5 mg tablet Take 1 Tab by mouth daily. 30 Tab 11    glucose blood VI test strips (OneTouch Ultra Test) strip Test Blood Sugar once daily Dx.  Code E11.49 150 Strip 3    Slow Fe 142 mg (45 mg iron) ER tablet TAKE 1 TABLET BY MOUTH TWICE A DAY      bumetanide (BUMEX) 0.5 mg tablet TAKE 1 TABLET BY MOUTH EVERY DAY      glipiZIDE (GLUCOTROL) 10 mg tablet TAKE 1 TABLET TWICE A  Tab 3    quinapril (ACCUPRIL) 10 mg tablet TAKE 1 TABLET DAILY 90 Tab 3    metFORMIN (GLUCOPHAGE) 850 mg tablet Take 1 Tab by mouth three (3) times daily (with meals). 270 Tab 3    glucose blood VI test strips (ASCENSIA AUTODISC VI, ONE TOUCH ULTRA TEST VI) strip by Does Not Apply route daily. 35 Strip 11    Oxygen-Air Delivery Systems 2 lpm at night 1 Each 0    aspirin 81 mg tablet Take 81 mg by mouth.  acetaminophen-codeine (TYLENOL #3) 300-30 mg per tablet TAKE 1 TABLET BY MOUTH EVERY 4 HOURS AS NEEDED FOR PAIN      MUCINEX 600 mg ER tablet TAKE 1 TO 2 TABLETS BY MOUTH TWICE A DAY AS NEEDED FOR CHEST CONGESTION         Allergies   Allergen Reactions    Lortab [Hydrocodone-Acetaminophen] Other (comments)     Nightmares             SOCIAL HISTORY:     Social History     Tobacco Use    Smoking status: Former Smoker     Packs/day: 1.50     Years: 25.00     Pack years: 37.50     Last attempt to quit: 1982     Years since quittin.4    Smokeless tobacco: Former User     Quit date: 2005   Substance Use Topics    Alcohol use:  Yes     Alcohol/week: 7.0 standard drinks     Types: 7 Glasses of wine per week    Drug use: No         FAMILY HISTORY:     Family History   Problem Relation Age of Onset   [facility] Stroke Mother     Heart Disease Father     Diabetes Father     Stroke Brother     Heart Disease Brother     Other Brother         AAA    Heart Disease Brother     Other Brother         AAA    Other Brother         anuerysms in legs and AAA, prediabetic    No Known Problems Daughter     No Known Problems Sister     Heart Disease Brother     Other Brother         AAA    Other Brother         Lyme disease?, prediabetic           REVIEW OF SYMPTOMS:     Review of Symptoms:  Constitutional: Negative for fever, chills  HEENT: Negative for nosebleeds, tinnitus, and vision changes. Respiratory: Negative for cough, wheezing  Cardiovascular: Negative for orthopnea, claudication, syncope, and PND. Gastrointestinal: Negative for abdominal pain, diarrhea, melena. Genitourinary: Negative for dysuria  Musculoskeletal: Negative for myalgias. Skin: Negative for rash  Heme: No problems bleeding. Neurological: Negative for speech change and focal weakness. PHYSICAL EXAM:     Physical Exam:  Visit Vitals  /68 (BP 1 Location: Left arm, BP Patient Position: Sitting)   Pulse (!) 56   Resp 16   Ht 5' 9\" (1.753 m)   Wt 208 lb (94.3 kg)   SpO2 97%   BMI 30.72 kg/m²     Patient appears generally well, mood and affect are appropriate and pleasant. HEENT:  Hearing intact, non-icteric, normocephalic, atraumatic. Neck Exam: Supple, No JVD or carotid bruits. Lung Exam: Clear to auscultation, even breath sounds. Cardiac Exam: Regular rate and rhythm with no murmur or rub  Abdomen: Soft, non-tender, normal bowel sounds. No bruits or masses. Extremities: Moves all ext well. No lower extremity edema. Vascular: 2+ dorsalis pedis pulses bilaterally. Psych: Appropriate affect  Neuro - Grossly intact      LABS / OTHER STUDIES:       Lab Results   Component Value Date/Time    Sodium 144 07/07/2020 02:45 PM    Potassium 5.1 07/07/2020 02:45 PM    Chloride 101 07/07/2020 02:45 PM    CO2 25 07/07/2020 02:45 PM    Anion gap 9 11/09/2017 10:51 AM    Glucose 66 07/07/2020 02:45 PM    BUN 15 07/07/2020 02:45 PM    Creatinine 0.93 07/07/2020 02:45 PM    BUN/Creatinine ratio 16 07/07/2020 02:45 PM    GFR est AA 89 07/07/2020 02:45 PM    GFR est non-AA 77 07/07/2020 02:45 PM    Calcium 9.9 07/07/2020 02:45 PM    Bilirubin, total 0.5 07/07/2020 02:45 PM    Alk.  phosphatase 61 07/07/2020 02:45 PM    Protein, total 7.2 07/07/2020 02:45 PM    Albumin 4.8 (H) 07/07/2020 02:45 PM    Globulin 3.4 11/09/2017 10:51 AM    A-G Ratio 2.0 07/07/2020 02:45 PM    ALT (SGPT) 12 07/07/2020 02:45 PM    AST (SGOT) 17 07/07/2020 02:45 PM       Lab Results   Component Value Date/Time    WBC 10.3 07/07/2020 02:45 PM    HGB 11.4 (L) 07/07/2020 02:45 PM    HCT 38.2 07/07/2020 02:45 PM    PLATELET 573 85/21/0661 02:45 PM    MCV 81 07/07/2020 02:45 PM         Lab Results   Component Value Date/Time    Cholesterol, total 171 04/17/2020 08:44 AM    HDL Cholesterol 35 (L) 04/17/2020 08:44 AM    LDL,Direct 119 (H) 07/06/2010 09:00 AM    LDL, calculated 99 04/17/2020 08:44 AM    VLDL, calculated 37 04/17/2020 08:44 AM    Triglyceride 186 (H) 04/17/2020 08:44 AM    CHOL/HDL Ratio 5.4 (H) 10/13/2010 09:18 AM     No results found for: TSH, TSH2, TSH3, TSHP, TSHEXT, TSHEXT      Labs 6/29/20 - BMP OK, CBC OK           CARDIAC DIAGNOSTICS:     Cardiac Evaluation Includes:    EKG 11/9/17 - NSR, 1st degree AVB, RBBB  EKG 6/29/20 - NSR, 1st degree AVB, RBBB    I viewed EKG's myself       ASSESSMENT AND PLAN:     Assessment and Plan:  1) HTN  - BP is still a little high -- > increase amlodipine to 10 mg daily   - He wants to work with PCP for HTN -- will defer further anti-hypertensive changes to Dr. Jorge Jensen      2) Abnormal EKG   - Recent EKG shows a RBBB which was also present years ago   - He denies any CP or sig SOB   - check an echo (can consider a stress test later for symptoms or an abnormal echo)     3)  He says he had CHF treated in March 2020 at [facility] - says his LVEF was normal   - He seems volume compensated  - will get [facility] records to review     4) Dyslipidemia and DM  - may be reasonable to consider a statin given his DM  - Defer risk factor control to his PCP (Dr. Jorge Jensen)     5) Phone FU after testing. See me as needed. 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 Has not spoken to his one daughter in 4+ years.         Laura Mejia MD, [address] [place] [address], Orlando Rocha [address] [place] [address]  Ph: [phone]   Ph [phone]      [facility]   7/28/2020  W records reviewed. Went to ED with SOB on 3/14/20 --> treated for CHF based on symptoms   Labs 3/14/20 - trop neg, BMP OK, Hgb 10.5, proBNP 307  CXR 3/14/20 - vascular congestion     Echo 3/14/20 ([facility]) - suboptimal study - unable to use Definity. LVEF 55%      ADDENDUM   8/17/2020  Echo 8/14/20 - LVEF 60-65%, grade 3 diastology, AV calcifications, mod LAE    Echo with stable findings. Will call him. ADDENDUM   8/18/2020  I called patient. He notes right foot is swelling and he doubled Bumex -- will call it in.  He's had more ANGELES since he had PNA. Will see him in a few weeks.

## 2020-08-14 ENCOUNTER — ANCILLARY PROCEDURE (OUTPATIENT)
Dept: CARDIOLOGY CLINIC | Age: 80
End: 2020-08-14
Payer: MEDICARE

## 2020-08-14 VITALS
DIASTOLIC BLOOD PRESSURE: 84 MMHG | SYSTOLIC BLOOD PRESSURE: 148 MMHG | WEIGHT: 208 LBS | HEIGHT: 69 IN | BODY MASS INDEX: 30.81 KG/M2

## 2020-08-14 DIAGNOSIS — R94.31 EKG ABNORMALITY: ICD-10-CM

## 2020-08-14 DIAGNOSIS — I10 ESSENTIAL HYPERTENSION, BENIGN: ICD-10-CM

## 2020-08-14 LAB
ECHO AO ASC DIAM: 2.92 CM
ECHO AO ROOT DIAM: 3.4 CM
ECHO AV AREA PEAK VELOCITY: 2.6 CM2
ECHO AV AREA VTI: 2.59 CM2
ECHO AV AREA/BSA PEAK VELOCITY: 1.2 CM2/M2
ECHO AV AREA/BSA VTI: 1.2 CM2/M2
ECHO AV MEAN GRADIENT: 3.33 MMHG
ECHO AV PEAK GRADIENT: 5.47 MMHG
ECHO AV PEAK VELOCITY: 116.9 CM/S
ECHO AV VTI: 24.87 CM
ECHO LA AREA 4C: 26.73 CM2
ECHO LA MAJOR AXIS: 4.72 CM
ECHO LA MINOR AXIS: 2.25 CM
ECHO LA VOL 2C: 70.14 ML (ref 18–58)
ECHO LA VOL 4C: 83.9 ML (ref 18–58)
ECHO LA VOL BP: 89.61 ML (ref 18–58)
ECHO LA VOL/BSA BIPLANE: 42.66 ML/M2 (ref 16–28)
ECHO LA VOLUME INDEX A2C: 33.39 ML/M2 (ref 16–28)
ECHO LA VOLUME INDEX A4C: 39.95 ML/M2 (ref 16–28)
ECHO LV E' SEPTAL VELOCITY: 7.2 CM/S
ECHO LV EDV A2C: 98.85 ML
ECHO LV EDV A4C: 133.22 ML
ECHO LV EDV BP: 117.84 ML (ref 67–155)
ECHO LV EDV INDEX A4C: 63.4 ML/M2
ECHO LV EDV INDEX BP: 56.1 ML/M2
ECHO LV EDV NDEX A2C: 47.1 ML/M2
ECHO LV EJECTION FRACTION A2C: 58 PERCENT
ECHO LV EJECTION FRACTION A4C: 63 PERCENT
ECHO LV EJECTION FRACTION BIPLANE: 61 PERCENT (ref 55–100)
ECHO LV ESV A2C: 41.25 ML
ECHO LV ESV A4C: 49.25 ML
ECHO LV ESV BP: 45.96 ML (ref 22–58)
ECHO LV ESV INDEX A2C: 19.6 ML/M2
ECHO LV ESV INDEX A4C: 23.4 ML/M2
ECHO LV ESV INDEX BP: 21.9 ML/M2
ECHO LV INTERNAL DIMENSION DIASTOLIC: 5.48 CM (ref 4.2–5.9)
ECHO LV INTERNAL DIMENSION SYSTOLIC: 3.65 CM
ECHO LV IVSD: 0.93 CM (ref 0.6–1)
ECHO LV MASS 2D: 161.5 G (ref 88–224)
ECHO LV MASS INDEX 2D: 76.9 G/M2 (ref 49–115)
ECHO LV POSTERIOR WALL DIASTOLIC: 0.69 CM (ref 0.6–1)
ECHO LVOT DIAM: 1.89 CM
ECHO LVOT PEAK GRADIENT: 4.66 MMHG
ECHO LVOT PEAK VELOCITY: 107.92 CM/S
ECHO LVOT SV: 64.4 ML
ECHO LVOT VTI: 22.88 CM
ECHO MV A VELOCITY: 58.88 CM/S
ECHO MV AREA PHT: 4.91 CM2
ECHO MV E DECELERATION TIME (DT): 0.15 S
ECHO MV E VELOCITY: 120.16 CM/S
ECHO MV E/A RATIO: 2.04
ECHO MV E/E' SEPTAL: 16.69
ECHO MV PRESSURE HALF TIME (PHT): 0.04 S
ECHO RA AREA 4C: 17.51 CM2
ECHO RV INTERNAL DIMENSION: 3.79 CM
ECHO RV TAPSE: 2.4 CM (ref 1.5–2)

## 2020-08-14 PROCEDURE — 93306 TTE W/DOPPLER COMPLETE: CPT | Performed by: SPECIALIST

## 2020-08-18 RX ORDER — BUMETANIDE 1 MG/1
1 TABLET ORAL DAILY
Qty: 90 TAB | Refills: 3 | Status: SHIPPED | OUTPATIENT
Start: 2020-08-18 | End: 2020-09-21 | Stop reason: DRUGHIGH

## 2020-09-21 ENCOUNTER — OFFICE VISIT (OUTPATIENT)
Dept: CARDIOLOGY CLINIC | Age: 80
End: 2020-09-21
Payer: MEDICARE

## 2020-09-21 VITALS
WEIGHT: 207 LBS | DIASTOLIC BLOOD PRESSURE: 62 MMHG | HEIGHT: 69 IN | BODY MASS INDEX: 30.66 KG/M2 | SYSTOLIC BLOOD PRESSURE: 142 MMHG | HEART RATE: 64 BPM

## 2020-09-21 DIAGNOSIS — R06.09 DOE (DYSPNEA ON EXERTION): ICD-10-CM

## 2020-09-21 DIAGNOSIS — I10 ESSENTIAL HYPERTENSION: ICD-10-CM

## 2020-09-21 DIAGNOSIS — Z87.891 HISTORY OF TOBACCO USE: ICD-10-CM

## 2020-09-21 DIAGNOSIS — I50.32 CHRONIC HEART FAILURE WITH PRESERVED EJECTION FRACTION (HCC): Primary | ICD-10-CM

## 2020-09-21 DIAGNOSIS — I45.10 RBBB: ICD-10-CM

## 2020-09-21 DIAGNOSIS — I50.32 CHRONIC HEART FAILURE WITH PRESERVED EJECTION FRACTION (HCC): ICD-10-CM

## 2020-09-21 DIAGNOSIS — E78.5 DYSLIPIDEMIA: ICD-10-CM

## 2020-09-21 LAB
ANION GAP SERPL CALC-SCNC: 6 MMOL/L (ref 5–15)
BNP SERPL-MCNC: 586 PG/ML
BUN SERPL-MCNC: 31 MG/DL (ref 6–20)
BUN/CREAT SERPL: 22 (ref 12–20)
CALCIUM SERPL-MCNC: 10.3 MG/DL (ref 8.5–10.1)
CHLORIDE SERPL-SCNC: 101 MMOL/L (ref 97–108)
CO2 SERPL-SCNC: 27 MMOL/L (ref 21–32)
CREAT SERPL-MCNC: 1.38 MG/DL (ref 0.7–1.3)
GLUCOSE SERPL-MCNC: 114 MG/DL (ref 65–100)
MAGNESIUM SERPL-MCNC: 2 MG/DL (ref 1.6–2.4)
POTASSIUM SERPL-SCNC: 5.1 MMOL/L (ref 3.5–5.1)
SODIUM SERPL-SCNC: 134 MMOL/L (ref 136–145)

## 2020-09-21 PROCEDURE — G0463 HOSPITAL OUTPT CLINIC VISIT: HCPCS | Performed by: NURSE PRACTITIONER

## 2020-09-21 PROCEDURE — 99214 OFFICE O/P EST MOD 30 MIN: CPT | Performed by: NURSE PRACTITIONER

## 2020-09-21 RX ORDER — BUMETANIDE 1 MG/1
TABLET ORAL
COMMUNITY
End: 2020-09-24 | Stop reason: SDUPTHER

## 2020-09-21 RX ORDER — METFORMIN HYDROCHLORIDE 850 MG/1
TABLET ORAL 2 TIMES DAILY WITH MEALS
COMMUNITY
End: 2021-06-01 | Stop reason: SDUPTHER

## 2020-09-21 NOTE — LETTER
9/22/20 Patient: Evangelina Parker YOB: 1940 Date of Visit: 9/21/2020 Cruz Castrejon MD 
[address] [place] [id number] VIA In Basket Dear Cruz Castrejon MD, Thank you for referring Mr. Viola Orta to [facility] for evaluation. My notes for this consultation are attached. If you have questions, please do not hesitate to call me. I look forward to following your patient along with you.  
 
 
Sincerely, 
 
Lorenza Alexander NP

## 2020-09-21 NOTE — PATIENT INSTRUCTIONS
Please get bloodwork today I will also order a CT Coronary Artery to look for possible blockages in your arteries Follow-up again with me in 4-6 weeks.

## 2020-09-21 NOTE — PROGRESS NOTES
Visit Vitals  BP (!) 142/62   Pulse 64   Ht 5' 9\" (1.753 m)   Wt 207 lb (93.9 kg)   BMI 30.57 kg/m²

## 2020-09-21 NOTE — PROGRESS NOTES
Stephane Herrera, JACQUELINE  [address] Sergio Renteria, Jr [address]  [place], [address] [facility]    Office [phone]  Fax [phone]      Patient: Mirian Diallo  : 1940      Today's Date: 2020      HISTORY OF PRESENT ILLNESS:     History of Present Illness:  Pt here for follow up of dyspnea as well as LE swelling. States he can't understand why he is so short of breath. Has felt SOB since dx of PNA last Nov.  Symptoms improve with rest but can only walk about a block before stopping. Using O2 at night 2L (rx by Dr. Bobby Paul). States all of this is very new; was 'active' last year. Working on weight loss with improved diet - has lost about 15lbs this year. Was recently seen in ED for LE swelling (left leg); Bumex dose increased to 1mg AM / 0.5mg PM with improvement. Had doppler in Feb at [address] to r/o clot. Swelling ongoing since this time. Patient denies any chest pain, palpitations, syncope, orthopnea, or paroxysmal nocturnal dyspnea. Has productive cough in AM.      Notes smoking hx from age [age]yrs of age; was smoking 1.5ppd when he quit. No known family hx of CAD. Father  of CHF but had never been to a doctor. 2 brothers have ; ? MI vs Aneurysm. Pt states there was no autopsy to confirm. Hm BP well controlled 120s/50s. HR 60s.         PAST MEDICAL HISTORY:     Past Medical History:   Diagnosis Date    Arthritis     [facility]     Diabetes ([facility] [address] [facility]     Diverticular disease of colon 2010    DM (diabetes mellitus) type II controlled, neurological manifestation ([address] 2014    Encounter for long-term (current) use of other medications     Hypertension     Mixed hyperlipidemia 2010    pt reports medication was started due to history of DM    Obesity     GEORGES (obstructive sleep apnea) 2010    Unspecified sleep apnea     O2 AT NIGHT 2L, CANNOT USE CPAP MASK       Past Surgical History:   Procedure Laterality Date    HX COLECTOMY  1997 RESECTION    HX KNEE ARTHROSCOPY Left     HX KNEE ARTHROSCOPY Right     HX LUMBAR LAMINECTOMY      HX OTHER SURGICAL  1971    CIRCUMCISION, VASECTOMY    HX WISDOM TEETH EXTRACTION         MEDICATIONS:     Current Outpatient Medications   Medication Sig Dispense Refill    amLODIPine (NORVASC) 5 mg tablet Take 1 Tab by mouth daily. 30 Tab 11    glucose blood VI test strips (OneTouch Ultra Test) strip Test Blood Sugar once daily Dx. Code E11.49 150 Strip 3    Slow Fe 142 mg (45 mg iron) ER tablet TAKE 1 TABLET BY MOUTH TWICE A DAY      bumetanide (BUMEX) 0.5 mg tablet TAKE 1 TABLET BY MOUTH EVERY DAY      glipiZIDE (GLUCOTROL) 10 mg tablet TAKE 1 TABLET TWICE A  Tab 3    quinapril (ACCUPRIL) 10 mg tablet TAKE 1 TABLET DAILY 90 Tab 3    metFORMIN (GLUCOPHAGE) 850 mg tablet Take 1 Tab by mouth three (3) times daily (with meals). 270 Tab 3    glucose blood VI test strips (ASCENSIA AUTODISC VI, ONE TOUCH ULTRA TEST VI) strip by Does Not Apply route daily. 35 Strip 11    Oxygen-Air Delivery Systems 2 lpm at night 1 Each 0    aspirin 81 mg tablet Take 81 mg by mouth.  acetaminophen-codeine (TYLENOL #3) 300-30 mg per tablet TAKE 1 TABLET BY MOUTH EVERY 4 HOURS AS NEEDED FOR PAIN      MUCINEX 600 mg ER tablet TAKE 1 TO 2 TABLETS BY MOUTH TWICE A DAY AS NEEDED FOR CHEST CONGESTION         Allergies   Allergen Reactions    Lortab [Hydrocodone-Acetaminophen] Other (comments)     Nightmares     SOCIAL HISTORY:     Social History     Tobacco Use    Smoking status: Former Smoker     Packs/day: 1.50     Years: 25.00     Pack years: 37.50     Last attempt to quit: 1982     Years since quittin.6    Smokeless tobacco: Former User     Quit date: 2005   Substance Use Topics    Alcohol use:  Yes     Alcohol/week: 7.0 standard drinks     Types: 7 Glasses of wine per week    Drug use: No     FAMILY HISTORY:     Family History   Problem Relation Age of Onset    Stroke Mother  Heart Disease Father     Diabetes Father     Stroke Brother     Heart Disease Brother     Other Brother         AAA    Heart Disease Brother     Other Brother         AAA    Other Brother         anuerysms in legs and AAA, prediabetic    No Known Problems Daughter     No Known Problems Sister     Heart Disease Brother     Other Brother         AAA    Other Brother         Lyme disease?, prediabetic           REVIEW OF SYMPTOMS:     (Positive findings above)  Constitutional: Negative for fever, chills, and diaphoresis. Respiratory: Negative for cough, hemoptysis, sputum production, shortness of breath and wheezing. Cardiovascular: Negative for chest pain, palpitations, orthopnea, leg swelling and PND. Gastrointestinal: Negative for heartburn, nausea, vomiting, blood in stool and melena. Genitourinary: Negative for dysuria and flank pain. Neurological: Negative for focal weakness, seizures, loss of consciousness  Endo/Heme/Allergies: Negative for abnormal bleeding. Psychiatric/Behavioral: Negative for memory loss. PHYSICAL EXAM:     Physical Exam:  Visit Vitals  BP (!) 142/62   Pulse 64   Ht 5' 9\" (1.753 m)   Wt 207 lb (93.9 kg)   BMI 30.57 kg/m²     Wt Readings from Last 3 Encounters:   09/21/20 207 lb (93.9 kg)   08/14/20 208 lb (94.3 kg)   07/09/20 208 lb (94.3 kg)     General - well developed well nourished  Neck - no JVD, neck supple  Cardiac - normal S1, S2, RRR, no murmurs, rubs or gallops.  No clicks  Vascular - carotids without bruits, radials pulses equal bilateral  Lungs - poor air movement bilateraly, however, no crackles, wheezing or rhonchi  Abd - soft nontender, non-distended, +BS  Extremities - trace left leg swelling, warm, well perfused  Neuro - nonfocal  Psych - normal mood and affect    LABS / OTHER STUDIES:      Labs 3/14/20 - trop neg, BMP OK, Hgb 10.5, proBNP 307    Labs 6/29/20 - BMP OK, CBC OK     CARDIAC DIAGNOSTICS:     Cardiac Evaluation Includes:    [facility] records    Went to ED with SOB on 3/14/20 --> treated for CHF based on symptoms   Labs 3/14/20 - trop neg, BMP OK, Hgb 10.5, proBNP 307  CXR 3/14/20 - vascular congestion   Echo 3/14/20 ([facility]) - suboptimal study - unable to use Definity.   LVEF 55%    Echo 8/14/20 - LVEF 60-65%, grade 3 diastology, AV calcifications, mod LAE    EKG 11/9/17 - NSR, 1st degree AVB, RBBB  EKG 6/29/20 - NSR, 1st degree AVB, RBBB    ASSESSMENT AND PLAN:     Assessment and Plan:  HTN  - hm BP normotensive on current meds  - 120s/50s    ANGELES  - Echo 8/14/20 - LVEF 60-65%, grade 3 diastology, AV calcifications, mod LAE  - several risk factors for CAD, hx of nml stress (per pt at OSH) several yrs ago; will check coronary CTA   - hx of tobacco use - 39pack yrs; follows with Dr. Ronak Estrada on 2L O2 at night    HFpEF  - echo with G3DD  - appears volume compensated on exam, on Bumex 1mg AM, 0.5mg PM  - check updated labs - BMP, Mag, proBNP    Dyslipidemia and DM  - per pt on statin d/t DM; not on med list - will bring name next visit  - Defer risk factor control to his PCP (Dr. Mir Vazquez)     F/u in 4-6 weeks or PRN  Lawrence Pino, ANP

## 2020-09-22 ENCOUNTER — TELEPHONE (OUTPATIENT)
Dept: CARDIOLOGY CLINIC | Age: 80
End: 2020-09-22

## 2020-09-22 RX ORDER — ATENOLOL 25 MG/1
TABLET ORAL
Qty: 2 TAB | Refills: 0 | Status: SHIPPED | OUTPATIENT
Start: 2020-09-22 | End: 2020-09-24

## 2020-09-22 NOTE — TELEPHONE ENCOUNTER
Patient is calling to follow up on getting scheduled for his CT scan. Please advise.     Phone: [phone]

## 2020-09-23 NOTE — TELEPHONE ENCOUNTER
Spoke to pt,  Gave him central scheduling's number. He was concerned with taking atenolol and amlodipine prior to test and wanted to confirm that is what is needed. Home /58, Pulse 60. I let him know we would call him back with further instruction if the atenolol is needed or not.

## 2020-09-24 ENCOUNTER — TELEPHONE (OUTPATIENT)
Dept: CARDIOLOGY CLINIC | Age: 80
End: 2020-09-24

## 2020-09-24 DIAGNOSIS — I50.32 CHRONIC HEART FAILURE WITH PRESERVED EJECTION FRACTION (HCC): Primary | ICD-10-CM

## 2020-09-24 RX ORDER — BUMETANIDE 1 MG/1
1 TABLET ORAL DAILY
Qty: 45 TAB | Refills: 0 | Status: SHIPPED | OUTPATIENT
Start: 2020-09-24 | End: 2020-10-29

## 2020-10-05 ENCOUNTER — TELEPHONE (OUTPATIENT)
Dept: CARDIOLOGY CLINIC | Age: 80
End: 2020-10-05

## 2020-10-05 NOTE — TELEPHONE ENCOUNTER
Patient is requesting to speak with Nikita Lee as he still has not received a call to be scheduled for his CT scan. Please advise.     Phone: [phone]

## 2020-10-07 ENCOUNTER — TELEPHONE (OUTPATIENT)
Dept: CARDIOLOGY CLINIC | Age: 80
End: 2020-10-07

## 2020-10-07 ENCOUNTER — HOSPITAL ENCOUNTER (OUTPATIENT)
Dept: CT IMAGING | Age: 80
Discharge: HOME | End: 2020-10-07
Attending: NURSE PRACTITIONER
Payer: MEDICARE

## 2020-10-07 DIAGNOSIS — I45.10 RBBB: ICD-10-CM

## 2020-10-07 DIAGNOSIS — I50.32 CHRONIC HEART FAILURE WITH PRESERVED EJECTION FRACTION (HCC): ICD-10-CM

## 2020-10-07 DIAGNOSIS — R06.09 DOE (DYSPNEA ON EXERTION): ICD-10-CM

## 2020-10-07 PROCEDURE — 75571 CT HRT W/O DYE W/CA TEST: CPT

## 2020-10-07 RX ORDER — ATORVASTATIN CALCIUM 40 MG/1
40 TABLET, FILM COATED ORAL
Qty: 30 TAB | Refills: 2 | Status: SHIPPED | OUTPATIENT
Start: 2020-10-07 | End: 2020-12-29

## 2020-10-07 RX ORDER — IODIXANOL 320 MG/ML
200 INJECTION, SOLUTION INTRAVASCULAR
Status: DISPENSED | OUTPATIENT
Start: 2020-10-07 | End: 2020-10-07

## 2020-10-07 NOTE — PROGRESS NOTES
Dr. Ashley Godoy - calcium score of 2426 - CTA coronary ordered for ANGELES, not completed given high calcium score. I'm going to call pt and discuss LHC. Would you like me to set up with you or Dr. Arnoldo Olivia. Duglas Estes?      Thanks -

## 2020-10-07 NOTE — TELEPHONE ENCOUNTER
Called to discuss CT Heart Scan results - calcium score 2426. Pt continues to be very SOB. Has to rest following minimal activity - feels worse today. No CP. Takes aspirin 81m/d. Not on statin like he previously thought. Start atorvastatin 40mg/d. Plan for [facility] with Dr. Elsy Lopez. Procedure/risks reviewed. Pt advised to avoid strenuous activity till post cath. Reviewed s/s that would warrant ED.

## 2020-10-07 NOTE — PROGRESS NOTES
Pt was in for a coronary CTA. However, the calcium score exceeded the number limit in order for us to continue the scan. Only a calcium score was done.

## 2020-10-08 NOTE — TELEPHONE ENCOUNTER
Called CATH lab to set up 5 [facility], for 10/13 @1300. They requested I double check with Dr. Kristel Zavala as IV backup. Please confirm.

## 2020-10-08 NOTE — TELEPHONE ENCOUNTER
Spoke to pt about [facility],  He had a prior engagement on Tues, 13th. He requested that we schedule him for the 14th. I let him know I would need to call the cath lab to get an exact time and I would call him back tomorrow to let him know. Gave pt all instruction for cath. Dr. Susan Galvin are you ok with 10/14 around 12:30/13:00?? Thanks!

## 2020-10-13 DIAGNOSIS — R06.02 SHORTNESS OF BREATH: Primary | ICD-10-CM

## 2020-10-13 RX ORDER — SODIUM CHLORIDE 0.9 % (FLUSH) 0.9 %
5-40 SYRINGE (ML) INJECTION EVERY 8 HOURS
Status: CANCELLED | OUTPATIENT
Start: 2020-10-14

## 2020-10-13 RX ORDER — SODIUM CHLORIDE 0.9 % (FLUSH) 0.9 %
5-40 SYRINGE (ML) INJECTION AS NEEDED
Status: CANCELLED | OUTPATIENT
Start: 2020-10-14

## 2020-10-13 NOTE — PROGRESS NOTES
COVID screening completed. Patient denies COVID symptoms and contacts. Patient advised to arrive 2 hours before procedure. Patient said off told him he could have a light breakfast before 7. To be NPO after. Patient to have ride to and from procedure and someone to stay with him pot procedure. Advised to hold metformin.

## 2020-10-13 NOTE — PROGRESS NOTES
Spoke with pt concerning [facility] procedure. (Pt IDx2). Instructions given to pt per VO of Dr. Chay Garcia. Pt NPO after 0800; hold Glipizide and metformin and take all other meds with sip of water in AM; have someone available to drive pt to and from procedure; pack a bag in case an overnight stay is warranted. [facility] scheduled for 5730 on 10/14/2020. Pt advised to arrive 2hrs prior to procedure for prep. Labs CBC, BMP STAT. Pt expressed understanding. Opportunities for questions, clarifications, and concerns provided.

## 2020-10-14 ENCOUNTER — HOSPITAL ENCOUNTER (OUTPATIENT)
Age: 80
Setting detail: OBSERVATION
Discharge: HOME | End: 2020-10-15
Attending: SPECIALIST | Admitting: INTERNAL MEDICINE
Payer: MEDICARE

## 2020-10-14 DIAGNOSIS — I25.118 CORONARY ARTERY DISEASE OF NATIVE HEART WITH STABLE ANGINA PECTORIS, UNSPECIFIED VESSEL OR LESION TYPE (HCC): ICD-10-CM

## 2020-10-14 DIAGNOSIS — R06.02 SHORTNESS OF BREATH: ICD-10-CM

## 2020-10-14 PROBLEM — I25.10 CAD (CORONARY ARTERY DISEASE): Status: ACTIVE | Noted: 2020-10-14

## 2020-10-14 LAB
ACT BLD: 180 SECS (ref 79–138)
ACT BLD: 197 SECS (ref 79–138)
ACT BLD: 202 SECS (ref 79–138)
ANION GAP SERPL CALC-SCNC: 6 MMOL/L (ref 5–15)
BUN SERPL-MCNC: 20 MG/DL (ref 6–20)
BUN/CREAT SERPL: 19 (ref 12–20)
CALCIUM SERPL-MCNC: 9.6 MG/DL (ref 8.5–10.1)
CHLORIDE SERPL-SCNC: 101 MMOL/L (ref 97–108)
CO2 SERPL-SCNC: 31 MMOL/L (ref 21–32)
CREAT SERPL-MCNC: 1.05 MG/DL (ref 0.7–1.3)
ERYTHROCYTE [DISTWIDTH] IN BLOOD BY AUTOMATED COUNT: 16.9 % (ref 11.5–14.5)
GLUCOSE BLD STRIP.AUTO-MCNC: 135 MG/DL (ref 65–100)
GLUCOSE SERPL-MCNC: 147 MG/DL (ref 65–100)
HCT VFR BLD AUTO: 39.1 % (ref 36.6–50.3)
HGB BLD-MCNC: 11.9 G/DL (ref 12.1–17)
MCH RBC QN AUTO: 25.2 PG (ref 26–34)
MCHC RBC AUTO-ENTMCNC: 30.4 G/DL (ref 30–36.5)
MCV RBC AUTO: 82.7 FL (ref 80–99)
NRBC # BLD: 0 K/UL (ref 0–0.01)
NRBC BLD-RTO: 0 PER 100 WBC
PLATELET # BLD AUTO: 230 K/UL (ref 150–400)
PMV BLD AUTO: 11.2 FL (ref 8.9–12.9)
POTASSIUM SERPL-SCNC: 3.8 MMOL/L (ref 3.5–5.1)
RBC # BLD AUTO: 4.73 M/UL (ref 4.1–5.7)
SERVICE CMNT-IMP: ABNORMAL
SODIUM SERPL-SCNC: 138 MMOL/L (ref 136–145)
WBC # BLD AUTO: 10.2 K/UL (ref 4.1–11.1)

## 2020-10-14 PROCEDURE — 85347 COAGULATION TIME ACTIVATED: CPT

## 2020-10-14 PROCEDURE — 82962 GLUCOSE BLOOD TEST: CPT

## 2020-10-14 PROCEDURE — C1769 GUIDE WIRE: HCPCS | Performed by: SPECIALIST

## 2020-10-14 PROCEDURE — C1887 CATHETER, GUIDING: HCPCS | Performed by: SPECIALIST

## 2020-10-14 PROCEDURE — 99218 HC RM OBSERVATION: CPT

## 2020-10-14 PROCEDURE — C1894 INTRO/SHEATH, NON-LASER: HCPCS | Performed by: SPECIALIST

## 2020-10-14 PROCEDURE — 36415 COLL VENOUS BLD VENIPUNCTURE: CPT

## 2020-10-14 PROCEDURE — 93453 R&L HRT CATH W/VENTRICLGRPHY: CPT | Performed by: SPECIALIST

## 2020-10-14 PROCEDURE — 80048 BASIC METABOLIC PNL TOTAL CA: CPT

## 2020-10-14 PROCEDURE — 74011000250 HC RX REV CODE- 250: Performed by: SPECIALIST

## 2020-10-14 PROCEDURE — 74011250637 HC RX REV CODE- 250/637: Performed by: INTERNAL MEDICINE

## 2020-10-14 PROCEDURE — 74011000250 HC RX REV CODE- 250: Performed by: INTERNAL MEDICINE

## 2020-10-14 PROCEDURE — 99152 MOD SED SAME PHYS/QHP 5/>YRS: CPT | Performed by: SPECIALIST

## 2020-10-14 PROCEDURE — C1874 STENT, COATED/COV W/DEL SYS: HCPCS | Performed by: SPECIALIST

## 2020-10-14 PROCEDURE — C1725 CATH, TRANSLUMIN NON-LASER: HCPCS | Performed by: SPECIALIST

## 2020-10-14 PROCEDURE — 92928 PRQ TCAT PLMT NTRAC ST 1 LES: CPT | Performed by: SPECIALIST

## 2020-10-14 PROCEDURE — 77030008543 HC TBNG MON PRSS MRTM -A: Performed by: SPECIALIST

## 2020-10-14 PROCEDURE — 93460 R&L HRT ART/VENTRICLE ANGIO: CPT | Performed by: SPECIALIST

## 2020-10-14 PROCEDURE — C1751 CATH, INF, PER/CENT/MIDLINE: HCPCS | Performed by: SPECIALIST

## 2020-10-14 PROCEDURE — 74011250636 HC RX REV CODE- 250/636: Performed by: SPECIALIST

## 2020-10-14 PROCEDURE — 85027 COMPLETE CBC AUTOMATED: CPT

## 2020-10-14 PROCEDURE — 99153 MOD SED SAME PHYS/QHP EA: CPT | Performed by: SPECIALIST

## 2020-10-14 PROCEDURE — 77030029065 HC DRSG HEMO QCLOT ZMED -B

## 2020-10-14 PROCEDURE — 77030003390 HC NDL ANGI MRTM -A: Performed by: SPECIALIST

## 2020-10-14 PROCEDURE — 93571 IV DOP VEL&/PRESS C FLO 1ST: CPT | Performed by: INTERNAL MEDICINE

## 2020-10-14 PROCEDURE — 77030004532 HC CATH ANGI DX IMP BSC -A: Performed by: SPECIALIST

## 2020-10-14 PROCEDURE — 77030013715 HC INFL SYS MRTM -B: Performed by: SPECIALIST

## 2020-10-14 PROCEDURE — 77030005513 HC CATH URETH FOL11 MDII -B: Performed by: SPECIALIST

## 2020-10-14 PROCEDURE — 74011000636 HC RX REV CODE- 636: Performed by: INTERNAL MEDICINE

## 2020-10-14 PROCEDURE — 77030013797 HC KT TRNSDUC PRSSR EDWD -A: Performed by: SPECIALIST

## 2020-10-14 PROCEDURE — 74011250636 HC RX REV CODE- 250/636: Performed by: INTERNAL MEDICINE

## 2020-10-14 PROCEDURE — C1760 CLOSURE DEV, VASC: HCPCS | Performed by: SPECIALIST

## 2020-10-14 PROCEDURE — 93005 ELECTROCARDIOGRAM TRACING: CPT

## 2020-10-14 DEVICE — XIENCE SIERRA™ EVEROLIMUS ELUTING CORONARY STENT SYSTEM 3.00 MM X 23 MM / RAPID-EXCHANGE
Type: IMPLANTABLE DEVICE | Status: FUNCTIONAL
Brand: XIENCE SIERRA™

## 2020-10-14 RX ORDER — ONDANSETRON 2 MG/ML
4 INJECTION INTRAMUSCULAR; INTRAVENOUS
Status: DISCONTINUED | OUTPATIENT
Start: 2020-10-14 | End: 2020-10-15 | Stop reason: HOSPADM

## 2020-10-14 RX ORDER — GUAIFENESIN 100 MG/5ML
81 LIQUID (ML) ORAL DAILY
Status: DISCONTINUED | OUTPATIENT
Start: 2020-10-15 | End: 2020-10-15 | Stop reason: HOSPADM

## 2020-10-14 RX ORDER — LIDOCAINE HYDROCHLORIDE 10 MG/ML
INJECTION INFILTRATION; PERINEURAL AS NEEDED
Status: DISCONTINUED | OUTPATIENT
Start: 2020-10-14 | End: 2020-10-14 | Stop reason: HOSPADM

## 2020-10-14 RX ORDER — ATORVASTATIN CALCIUM 20 MG/1
40 TABLET, FILM COATED ORAL
Status: DISCONTINUED | OUTPATIENT
Start: 2020-10-14 | End: 2020-10-15 | Stop reason: HOSPADM

## 2020-10-14 RX ORDER — BUMETANIDE 1 MG/1
1 TABLET ORAL DAILY
Status: DISCONTINUED | OUTPATIENT
Start: 2020-10-15 | End: 2020-10-15 | Stop reason: HOSPADM

## 2020-10-14 RX ORDER — LISINOPRIL 5 MG/1
10 TABLET ORAL DAILY
Status: DISCONTINUED | OUTPATIENT
Start: 2020-10-15 | End: 2020-10-15 | Stop reason: HOSPADM

## 2020-10-14 RX ORDER — SODIUM CHLORIDE 0.9 % (FLUSH) 0.9 %
5-40 SYRINGE (ML) INJECTION EVERY 8 HOURS
Status: DISCONTINUED | OUTPATIENT
Start: 2020-10-14 | End: 2020-10-14 | Stop reason: HOSPADM

## 2020-10-14 RX ORDER — SODIUM CHLORIDE 9 MG/ML
1000 INJECTION, SOLUTION INTRAVENOUS CONTINUOUS
Status: DISCONTINUED | OUTPATIENT
Start: 2020-10-14 | End: 2020-10-15

## 2020-10-14 RX ORDER — SODIUM CHLORIDE 0.9 % (FLUSH) 0.9 %
5-40 SYRINGE (ML) INJECTION EVERY 8 HOURS
Status: DISCONTINUED | OUTPATIENT
Start: 2020-10-14 | End: 2020-10-15 | Stop reason: HOSPADM

## 2020-10-14 RX ORDER — FENTANYL CITRATE 50 UG/ML
INJECTION, SOLUTION INTRAMUSCULAR; INTRAVENOUS AS NEEDED
Status: DISCONTINUED | OUTPATIENT
Start: 2020-10-14 | End: 2020-10-14 | Stop reason: HOSPADM

## 2020-10-14 RX ORDER — HEPARIN SODIUM 1000 [USP'U]/ML
INJECTION, SOLUTION INTRAVENOUS; SUBCUTANEOUS AS NEEDED
Status: DISCONTINUED | OUTPATIENT
Start: 2020-10-14 | End: 2020-10-14 | Stop reason: HOSPADM

## 2020-10-14 RX ORDER — MIDAZOLAM HYDROCHLORIDE 1 MG/ML
INJECTION, SOLUTION INTRAMUSCULAR; INTRAVENOUS AS NEEDED
Status: DISCONTINUED | OUTPATIENT
Start: 2020-10-14 | End: 2020-10-14 | Stop reason: HOSPADM

## 2020-10-14 RX ORDER — MORPHINE SULFATE 2 MG/ML
2 INJECTION, SOLUTION INTRAMUSCULAR; INTRAVENOUS
Status: DISCONTINUED | OUTPATIENT
Start: 2020-10-14 | End: 2020-10-15 | Stop reason: HOSPADM

## 2020-10-14 RX ORDER — GLIPIZIDE 5 MG/1
10 TABLET ORAL
Status: DISCONTINUED | OUTPATIENT
Start: 2020-10-15 | End: 2020-10-15 | Stop reason: HOSPADM

## 2020-10-14 RX ORDER — AMLODIPINE BESYLATE 5 MG/1
10 TABLET ORAL DAILY
Status: DISCONTINUED | OUTPATIENT
Start: 2020-10-14 | End: 2020-10-15 | Stop reason: HOSPADM

## 2020-10-14 RX ORDER — SODIUM CHLORIDE 0.9 % (FLUSH) 0.9 %
5-40 SYRINGE (ML) INJECTION AS NEEDED
Status: DISCONTINUED | OUTPATIENT
Start: 2020-10-14 | End: 2020-10-14 | Stop reason: HOSPADM

## 2020-10-14 RX ORDER — HEPARIN SODIUM 200 [USP'U]/100ML
INJECTION, SOLUTION INTRAVENOUS
Status: COMPLETED | OUTPATIENT
Start: 2020-10-14 | End: 2020-10-14

## 2020-10-14 RX ORDER — GUAIFENESIN 100 MG/5ML
LIQUID (ML) ORAL AS NEEDED
Status: DISCONTINUED | OUTPATIENT
Start: 2020-10-14 | End: 2020-10-14 | Stop reason: HOSPADM

## 2020-10-14 RX ADMIN — SODIUM CHLORIDE 1000 ML: 900 INJECTION, SOLUTION INTRAVENOUS at 18:30

## 2020-10-14 RX ADMIN — Medication 10 ML: at 21:22

## 2020-10-14 RX ADMIN — AMLODIPINE BESYLATE 10 MG: 5 TABLET ORAL at 19:12

## 2020-10-14 RX ADMIN — ATORVASTATIN CALCIUM 40 MG: 20 TABLET, FILM COATED ORAL at 21:22

## 2020-10-14 NOTE — PROGRESS NOTES
1230 Received patient from waiting area. Armband and allergies verbally confirmed with patient. Procedure explained and consents signed. 1530 TRANSFER - OUT REPORT:    Verbal report given to Michelle(name) on [address]  being transferred to cath lab(unit) for routine progression of care       Report consisted of patients Situation, Background, Assessment and   Recommendations(SBAR). Information from the following report(s) Procedure Summary was reviewed with the receiving nurse. Lines:   Peripheral IV 10/14/20 Left Forearm (Active)   Site Assessment Clean, dry, & intact 10/14/20 1251   Phlebitis Assessment 0 10/14/20 1251        Opportunity for questions and clarification was provided. Patient transported with:   Registered Nurse   [address] REPORT:    Verbal report received from Kamran Colbert rn(name) on [address]  being received from cath lab (unit) for routine progression of care      Report consisted of patients Situation, Background, Assessment and   Recommendations(SBAR). Information from the following report(s) Procedure Summary was reviewed with the receiving nurse. Opportunity for questions and clarification was provided. Assessment completed upon patients arrival to unit and care assumed.  Right groin venous sheath with pressure bag site soft in tact sinus denise 50's 6 liters 02   1900 pt restless, beard draining straw colored urine 1920 b/p going up systolic 936, gave norvasc 10 mg po   1950 sitting at 40 degrees, eating   2010 report called  To Rajinder Burt rn preparing  To transfer  To room [phone] assessed right groin site with oncoming nurse, placed on monitor questions answered

## 2020-10-14 NOTE — Clinical Note
Lesion: Located in the Mid LAD. Stent inserted. Single technique used. First inflation pressure = 12 elisa; inflation time: 30 sec.

## 2020-10-14 NOTE — H&P
Amy Lora MD. [facility]              Patient: Mikal Pike  : 1940      Today's Date: 10/14/2020          HISTORY OF PRESENT ILLNESS:     History of Present Illness:    Has had continued ANGELES and with some chest pressure when he gets SOB. No orthopnea or resting symptoms. PAST MEDICAL HISTORY:     Past Medical History:   Diagnosis Date    Arthritis     [facility] [address]     Diabetes [facility] [address]     Diverticular disease of colon 2010    DM (diabetes mellitus) type II controlled, neurological manifestation ([address] 2014    Encounter for long-term (current) use of other medications     Hypertension     Mixed hyperlipidemia 2010    pt reports medication was started due to history of DM    Obesity     GEORGES (obstructive sleep apnea) 2010    Unspecified sleep apnea     O2 AT NIGHT 2L, CANNOT USE CPAP MASK         Past Surgical History:   Procedure Laterality Date    HX COLECTOMY  1997    RESECTION    HX KNEE ARTHROSCOPY Left     HX KNEE ARTHROSCOPY Right     HX LUMBAR LAMINECTOMY      HX OTHER SURGICAL      CIRCUMCISION, VASECTOMY    HX WISDOM TEETH EXTRACTION         MEDICATIONS:     No current facility-administered medications on file prior to encounter. Current Outpatient Medications on File Prior to Encounter   Medication Sig Dispense Refill    atorvastatin (LIPITOR) 40 mg tablet Take 1 Tab by mouth nightly. 30 Tab 2    bumetanide (BUMEX) 1 mg tablet Take 1 Tab by mouth daily. Take 1mg daily with extra 0.5mg in the afternoon as needed for leg swelling. 45 Tab 0    metFORMIN (GLUCOPHAGE) 850 mg tablet Take  by mouth two (2) times daily (with meals).  amLODIPine (NORVASC) 10 mg tablet Take 1 Tab by mouth daily. 30 Tab 12    glucose blood VI test strips (OneTouch Ultra Test) strip Test Blood Sugar once daily Dx.  Code E11.49 150 Strip 3    Slow Fe 142 mg (45 mg iron) ER tablet TAKE 1 TABLET BY MOUTH TWICE A DAY      glipiZIDE (GLUCOTROL) 10 mg tablet TAKE 1 TABLET TWICE A  Tab 3    quinapril (ACCUPRIL) 10 mg tablet TAKE 1 TABLET DAILY 90 Tab 3    glucose blood VI test strips (ASCENSIA AUTODISC VI, ONE TOUCH ULTRA TEST VI) strip by Does Not Apply route daily. 35 Strip 11    Oxygen-Air Delivery Systems 2 lpm at night 1 Each 0    aspirin 81 mg tablet Take 81 mg by mouth. Allergies   Allergen Reactions    Lortab [Hydrocodone-Acetaminophen] Other (comments)     Nightmares             SOCIAL HISTORY:     Social History     Tobacco Use    Smoking status: Former Smoker     Packs/day: 1.50     Years: 25.00     Pack years: 37.50     Last attempt to quit: 1982     Years since quittin.7    Smokeless tobacco: Former User     Quit date: 2005   Substance Use Topics    Alcohol use: Yes     Alcohol/week: 7.0 standard drinks     Types: 7 Glasses of wine per week    Drug use: No         FAMILY HISTORY:     Family History   Problem Relation Age of Onset    Stroke Mother     Heart Disease Father     Diabetes Father     Stroke Brother     Heart Disease Brother     Other Brother         AAA    Heart Disease Brother     Other Brother         AAA    Other Brother         anuerysms in legs and AAA, prediabetic    No Known Problems Daughter     No Known Problems Sister     Heart Disease Brother     Other Brother         AAA    Other Brother         Lyme disease?, prediabetic              REVIEW OF SYMPTOMS:      Review of Symptoms:  Constitutional: Negative for fever, chills  HEENT: Negative for nosebleeds, tinnitus, and vision changes. Respiratory: Negative for cough, wheezing  Cardiovascular: Negative for orthopnea, claudication, syncope, and PND. Gastrointestinal: Negative for abdominal pain, diarrhea, melena. Genitourinary: Negative for dysuria  Musculoskeletal: Negative for myalgias. Skin: Negative for rash  Heme: No problems bleeding.   Neurological: Negative for speech change and focal weakness.            PHYSICAL EXAM:      Physical Exam:  Visit Vitals  BP (!) 172/63 (BP 1 Location: Left arm, BP Patient Position: At rest)   Pulse 60   Temp 98 °F (36.7 °C)   Resp 16   Ht 5' 9\" (1.753 m)   Wt 206 lb 9.1 oz (93.7 kg)   SpO2 97%   BMI 30.51 kg/m²       Patient appears generally well, mood and affect are appropriate and pleasant. HEENT:  Hearing intact, non-icteric, normocephalic, atraumatic. Neck Exam: Supple  Lung Exam: Clear to auscultation, even breath sounds. Cardiac Exam: Regular rate and rhythm with no murmur or rub  Abdomen: Soft, non-tender, obese   Extremities: Moves all ext well. Mild R>L lower extremity edema. Vascular: 2+ dorsalis pedis pulses bilaterally. Psych: Appropriate affect  Neuro - Grossly intact        LABS / OTHER STUDIES:         Lab Results   Component Value Date/Time    Sodium 138 10/14/2020 12:50 PM    Potassium 3.8 10/14/2020 12:50 PM    Chloride 101 10/14/2020 12:50 PM    CO2 31 10/14/2020 12:50 PM    Anion gap 6 10/14/2020 12:50 PM    Glucose 147 (H) 10/14/2020 12:50 PM    BUN 20 10/14/2020 12:50 PM    Creatinine 1.05 10/14/2020 12:50 PM    BUN/Creatinine ratio 19 10/14/2020 12:50 PM    GFR est AA >60 10/14/2020 12:50 PM    GFR est non-AA >60 10/14/2020 12:50 PM    Calcium 9.6 10/14/2020 12:50 PM    Bilirubin, total 0.5 07/07/2020 02:45 PM    Alk.  phosphatase 61 07/07/2020 02:45 PM    Protein, total 7.2 07/07/2020 02:45 PM    Albumin 4.8 (H) 07/07/2020 02:45 PM    Globulin 3.4 11/09/2017 10:51 AM    A-G Ratio 2.0 07/07/2020 02:45 PM    ALT (SGPT) 12 07/07/2020 02:45 PM    AST (SGOT) 17 07/07/2020 02:45 PM     Lab Results   Component Value Date/Time    WBC 10.2 10/14/2020 12:50 PM    HGB 11.9 (L) 10/14/2020 12:50 PM    HCT 39.1 10/14/2020 12:50 PM    PLATELET 231 89/07/9671 12:50 PM    MCV 82.7 10/14/2020 12:50 PM                   CARDIAC DIAGNOSTICS:      Cardiac Evaluation Includes:     EKG 11/9/17 - NSR, 1st degree AVB, RBBB  EKG 6/29/20 - NSR, 1st degree AVB, RBBB     Went to ED with SOB on 3/14/20 --> treated for CHF based on symptoms   Labs 3/14/20 - trop neg, BMP OK, Hgb 10.5, proBNP 307  CXR 3/14/20 - vascular congestion      Echo 3/14/20 ([facility]) - suboptimal study - unable to use Definity. LVEF 55%     Echo 8/14/20 - LVEF 60-65%, grade 3 diastology, AV calcifications, mod LAE     CT Heart Scan 10/7/20 - 1. CAC Score 2426.    2. Mild emphysema. 3. Mild segmental bronchiectasis and air trapping in the left lower lobe. 4. Possible cirrhosis. Spleen at the upper limits of normal in size.          ASSESSMENT AND PLAN:      Assessment and Plan:  ANGELES  - CT heart scan 7805   - LV systolic function normal   - He continues with ANGELES despite medical management -  - Is at high risk for CAD  - Reviewed risks (MI, CVA, MATTEO, death, etc) and benefits of cath and he agrees to proceed  - If LHC unremarkable, then may consider checking RHC to look at PA pressures today as well (reviewed with him)             Snow Garay MD, [address], [address]      [address]  [address], [address]                 [place], [address] [place] [address]  Ph: [phone]                               Ph [phone]

## 2020-10-14 NOTE — ROUTINE PROCESS
TRANSFER - IN REPORT: 
 
Verbal report received from Jesika(name) on [address]  being received from Cath lab(unit) for routine progression of care Report consisted of patients Situation, Background, Assessment and  
Recommendations(SBAR). Information from the following report(s) SBAR, Procedure Summary, Intake/Output, MAR, Recent Results and Cardiac Rhythm normal sinus was reviewed with the receiving nurse. Opportunity for questions and clarification was provided. Assessment completed upon patients arrival to unit and care assumed. Bedside and Verbal shift change report given to Shabana Huston (oncoming nurse) by Mabel Madrigal (offgoing nurse). Report included the following information SBAR, Procedure Summary, Intake/Output, MAR and Cardiac Rhythm sinus denise.

## 2020-10-14 NOTE — PROGRESS NOTES
1837    Blood aspirated from sheath then Venous sheath pulled 7 Fr RIGHT Groin. Luiz Pier placed and manual pressure held by Margaret Alberts RN. Hemostasis achieved at 1847. Dressing applied. Pt voices understanding of post procedure bedrest instructions.

## 2020-10-14 NOTE — Clinical Note
Lesion located in the Mid LAD. Balloon inserted. Balloon inflated using multiple inflations inflation technique. Lesion #1: Pressure = 14 elisa; Duration = 30 sec. Inflation 2: Pressure = 14 elisa; Duration = 30 sec.

## 2020-10-14 NOTE — PROCEDURES
Marcio Gonzalez MD. [facility] [place]              Patient: Janet Nolen  : 1940      Today's Date: 10/14/2020        BRIEF PROCEDURE NOTE    Date of Procedure: 10/14/2020   Preoperative Diagnosis: ANGELES  Postoperative Diagnosis: Moderate CAD and moderate pulmonary HTN  Procedure: Left heart cath, Right Heart cath, LV angiography, coronary angiography  Cardiologist: Arpit Alvarenga MD  Anesthesia: local + IV sedation  Estimated Blood Loss: Minimal  Specimens Removed: None  Assistants: None  Grafts, transplants, or devices implanted: None  Findings: mLAD with sequential 30-70% lesions, PASP 50 mmHg, PCWP 14, LVEDP 10 ---> Dr. Nahum Mcclelland to perform IFR of mLAD  See full cath note.   Complications: none

## 2020-10-14 NOTE — Clinical Note
Lesion located in the Mid LAD. Balloon inserted. Balloon inflated using multiple inflations inflation technique. Lesion #1: Pressure = 6 elisa; Duration = 30 sec. Inflation 2: Pressure = 6 elisa; Duration = 30 sec.

## 2020-10-14 NOTE — Clinical Note
TRANSFER - OUT REPORT:     Verbal report given to: Prateek Plasencia. Report consisted of patient's Situation, Background, Assessment and   Recommendations(SBAR). Opportunity for questions and clarification was provided. Patient transported with a Registered Nurse. [place] [facility]

## 2020-10-14 NOTE — Clinical Note
TRANSFER - IN REPORT:     Verbal report received from: [facility]. Report consisted of patient's Situation, Background, Assessment and   Recommendations(SBAR). Opportunity for questions and clarification was provided. Assessment completed upon patient's arrival to unit and care assumed. Patient transported with a Registered Nurse.

## 2020-10-15 VITALS
BODY MASS INDEX: 30.6 KG/M2 | HEART RATE: 64 BPM | SYSTOLIC BLOOD PRESSURE: 152 MMHG | DIASTOLIC BLOOD PRESSURE: 68 MMHG | HEIGHT: 69 IN | WEIGHT: 206.57 LBS | TEMPERATURE: 97.7 F | OXYGEN SATURATION: 96 % | RESPIRATION RATE: 18 BRPM

## 2020-10-15 LAB
ANION GAP SERPL CALC-SCNC: 3 MMOL/L (ref 5–15)
ATRIAL RATE: 50 BPM
BUN SERPL-MCNC: 16 MG/DL (ref 6–20)
BUN/CREAT SERPL: 16 (ref 12–20)
CALCIUM SERPL-MCNC: 9 MG/DL (ref 8.5–10.1)
CALCULATED P AXIS, ECG09: 72 DEGREES
CALCULATED R AXIS, ECG10: 91 DEGREES
CALCULATED T AXIS, ECG11: -18 DEGREES
CHLORIDE SERPL-SCNC: 103 MMOL/L (ref 97–108)
CO2 SERPL-SCNC: 32 MMOL/L (ref 21–32)
CREAT SERPL-MCNC: 1.01 MG/DL (ref 0.7–1.3)
DIAGNOSIS, 93000: NORMAL
ERYTHROCYTE [DISTWIDTH] IN BLOOD BY AUTOMATED COUNT: 16.7 % (ref 11.5–14.5)
GLUCOSE BLD STRIP.AUTO-MCNC: 146 MG/DL (ref 65–100)
GLUCOSE SERPL-MCNC: 168 MG/DL (ref 65–100)
HCT VFR BLD AUTO: 37.3 % (ref 36.6–50.3)
HGB BLD-MCNC: 11.4 G/DL (ref 12.1–17)
MCH RBC QN AUTO: 25.1 PG (ref 26–34)
MCHC RBC AUTO-ENTMCNC: 30.6 G/DL (ref 30–36.5)
MCV RBC AUTO: 82.2 FL (ref 80–99)
NRBC # BLD: 0 K/UL (ref 0–0.01)
NRBC BLD-RTO: 0 PER 100 WBC
P-R INTERVAL, ECG05: 290 MS
PLATELET # BLD AUTO: 200 K/UL (ref 150–400)
PMV BLD AUTO: 10.6 FL (ref 8.9–12.9)
POTASSIUM SERPL-SCNC: 3.7 MMOL/L (ref 3.5–5.1)
Q-T INTERVAL, ECG07: 510 MS
QRS DURATION, ECG06: 138 MS
QTC CALCULATION (BEZET), ECG08: 464 MS
RBC # BLD AUTO: 4.54 M/UL (ref 4.1–5.7)
SERVICE CMNT-IMP: ABNORMAL
SODIUM SERPL-SCNC: 138 MMOL/L (ref 136–145)
TROPONIN I SERPL-MCNC: 0.07 NG/ML
VENTRICULAR RATE, ECG03: 50 BPM
WBC # BLD AUTO: 9 K/UL (ref 4.1–11.1)

## 2020-10-15 PROCEDURE — 36415 COLL VENOUS BLD VENIPUNCTURE: CPT

## 2020-10-15 PROCEDURE — 77010033678 HC OXYGEN DAILY

## 2020-10-15 PROCEDURE — 80048 BASIC METABOLIC PNL TOTAL CA: CPT

## 2020-10-15 PROCEDURE — 84484 ASSAY OF TROPONIN QUANT: CPT

## 2020-10-15 PROCEDURE — 99217 PR OBSERVATION CARE DISCHARGE MANAGEMENT: CPT | Performed by: SPECIALIST

## 2020-10-15 PROCEDURE — 99218 HC RM OBSERVATION: CPT

## 2020-10-15 PROCEDURE — 74011250637 HC RX REV CODE- 250/637: Performed by: INTERNAL MEDICINE

## 2020-10-15 PROCEDURE — 82962 GLUCOSE BLOOD TEST: CPT

## 2020-10-15 PROCEDURE — 85027 COMPLETE CBC AUTOMATED: CPT

## 2020-10-15 PROCEDURE — 74011250637 HC RX REV CODE- 250/637: Performed by: SPECIALIST

## 2020-10-15 PROCEDURE — 74011250636 HC RX REV CODE- 250/636: Performed by: INTERNAL MEDICINE

## 2020-10-15 RX ORDER — SPIRONOLACTONE 25 MG/1
25 TABLET ORAL DAILY
Status: DISCONTINUED | OUTPATIENT
Start: 2020-10-15 | End: 2020-10-15 | Stop reason: HOSPADM

## 2020-10-15 RX ORDER — SPIRONOLACTONE 25 MG/1
25 TABLET ORAL DAILY
Qty: 30 TAB | Refills: 5 | Status: SHIPPED | OUTPATIENT
Start: 2020-10-15 | End: 2021-03-23

## 2020-10-15 RX ADMIN — ASPIRIN 81 MG: 81 TABLET, CHEWABLE ORAL at 08:13

## 2020-10-15 RX ADMIN — BUMETANIDE 1 MG: 1 TABLET ORAL at 08:13

## 2020-10-15 RX ADMIN — LISINOPRIL 10 MG: 5 TABLET ORAL at 11:05

## 2020-10-15 RX ADMIN — GLIPIZIDE 10 MG: 5 TABLET ORAL at 08:13

## 2020-10-15 RX ADMIN — SPIRONOLACTONE 25 MG: 25 TABLET ORAL at 10:00

## 2020-10-15 RX ADMIN — SODIUM CHLORIDE 1000 ML: 900 INJECTION, SOLUTION INTRAVENOUS at 01:42

## 2020-10-15 RX ADMIN — TICAGRELOR 90 MG: 90 TABLET ORAL at 08:13

## 2020-10-15 NOTE — DISCHARGE SUMMARY
Cardiology Discharge Summary     Patient ID:  Alvaro Landry  [id number]  51 y.o.  1940    Admit Date: 10/14/2020    Discharge Date: 10/15/2020     Admitting Physician: Bossman Calles MD     Discharge Physician:Dr Rupa Melissa NP    Admission Diagnoses: Shortness of breath [R06.02]  CAD (coronary artery disease) [I25.10]    Discharge Diagnoses: Active Problems:    CAD (coronary artery disease) (10/14/2020)        Discharge Condition: Good    Cardiology Procedures this Admission:  Left heart catheterization with PCI    Consults: None        Discharge Exam:     Visit Vitals  BP (!) 152/68 (BP 1 Location: Right arm, BP Patient Position: At rest)   Pulse 64   Temp 97.7 °F (36.5 °C)   Resp 18   Ht 5' 9\" (1.753 m)   Wt 206 lb 9.1 oz (93.7 kg)   SpO2 96%   BMI 30.51 kg/m²     General Appearance:  Well developed, elderly, obese,alert and oriented x 3, and individual in no acute distress. Ears/Nose/Mouth/Throat:   Hearing grossly normal.         Neck: Supple. Chest:   Lungs clear to auscultation bilaterally. Cardiovascular:  Regular rate and rhythm,  no murmur. Abdomen:   Soft, non-tender, bowel sounds are active. Extremities: No edema bilaterally. + 2 DP pulses. Feet warm and pink. Skin: Warm and dry. Groin site soft, mildly tender, covered w CDI bandage- small amount of old dry drainage on bandage                HOSPITAL COURSE: Alvaro Landry is a [de-identified] y.o. female who came to [facility] for an elective cardiac cath. CAD s/p stent to mLAD: DAPT x 1 year w plans to transition to plavix (d/t cost)  - statin  No bb d/t bradycardia  Cardiac rehab     Hypertension: norvasc/ACEI, aldactone added    Chronic HFpEF: aldatone/bumex    GEORGES on home O2  Emphysema   obesity Body mass index is 30.51 kg/m².   diet and exercise       Reviewed new medications  Disposition: home    Patient Instructions:   Current Discharge Medication List      START taking these medications    Details   spironolactone (ALDACTONE) 25 mg tablet Take 1 Tab by mouth daily. Qty: 30 Tab, Refills: 5      ticagrelor (BRILINTA) 90 mg tablet Take 1 Tab by mouth every twelve (12) hours every twelve (12) hours. Qty: 60 Tab, Refills: 11         CONTINUE these medications which have NOT CHANGED    Details   atorvastatin (LIPITOR) 40 mg tablet Take 1 Tab by mouth nightly. Qty: 30 Tab, Refills: 2      bumetanide (BUMEX) 1 mg tablet Take 1 Tab by mouth daily. Take 1mg daily with extra 0.5mg in the afternoon as needed for leg swelling. Qty: 45 Tab, Refills: 0    Associated Diagnoses: Chronic heart failure with preserved ejection fraction (HCC)      metFORMIN (GLUCOPHAGE) 850 mg tablet Take  by mouth two (2) times daily (with meals). amLODIPine (NORVASC) 10 mg tablet Take 1 Tab by mouth daily. Qty: 30 Tab, Refills: 12    Associated Diagnoses: Essential hypertension, benign      !! glucose blood VI test strips (OneTouch Ultra Test) strip Test Blood Sugar once daily Dx. Code E11.49  Qty: 150 Strip, Refills: 3    Associated Diagnoses: Type 2 diabetes mellitus with diabetic polyneuropathy, without long-term current use of insulin ([facility])      Slow Fe 142 mg (45 mg iron) ER tablet TAKE 1 TABLET BY MOUTH TWICE A DAY      glipiZIDE (GLUCOTROL) 10 mg tablet TAKE 1 TABLET TWICE A DAY  Qty: 180 Tab, Refills: 3    Associated Diagnoses: Controlled type 2 diabetes mellitus with diabetic polyneuropathy, without long-term current use of insulin ([facility])      quinapril (ACCUPRIL) 10 mg tablet TAKE 1 TABLET DAILY  Qty: 90 Tab, Refills: 3    Associated Diagnoses: Essential hypertension, benign      !! glucose blood VI test strips (ASCENSIA AUTODISC VI, ONE TOUCH ULTRA TEST VI) strip by Does Not Apply route daily.   Qty: 35 Strip, Refills: 11    Associated Diagnoses: DM (diabetes mellitus) ([facility]      Oxygen-Air Delivery Systems 2 lpm at night  Qty: 1 Each, Refills: 0    Comments: Nocturnal O2 2 lpm  Associated Diagnoses: GEORGES (obstructive sleep apnea)      aspirin 81 mg tablet Take 81 mg by mouth. !! - Potential duplicate medications found. Please discuss with provider. Referenced discharge instructions provided by nursing for diet and activity. Follow-up with Dr Sterling Herr   Date Time Provider Mal Gross   10/20/2020  9:20 AM Nanda Merritt MD [facility] BS AMB   11/4/2020 11:20 AM Nathaniel Raymond MD [facility] BS AMB         Signed:  Denae Mcallister NP  10/15/2020  8:43 AM      He is feeling better today. Added aldactone for BP control and diastolic dysfunction. Cici Johnson MD, [address]  [address], [address]  Ph: [phone]   Ph [phone]

## 2020-10-15 NOTE — PROGRESS NOTES
Problem: Falls - Risk of  Goal: *Absence of Falls  Description: Document Ewelina Olmosra Fall Risk and appropriate interventions in the flowsheet.   Outcome: Progressing Towards Goal  Note: Fall Risk Interventions:            Medication Interventions: Bed/chair exit alarm    Elimination Interventions: Bed/chair exit alarm, Call light in reach, Urinal in reach

## 2020-10-15 NOTE — PROGRESS NOTES
Problem: Falls - Risk of  Goal: *Absence of Falls  Description: Document Ajit Dickerson Fall Risk and appropriate interventions in the flowsheet.   Outcome: Resolved/Met  Note: Fall Risk Interventions:  Mobility Interventions: Communicate number of staff needed for ambulation/transfer, Patient to call before getting OOB         Medication Interventions: Assess postural VS orthostatic hypotension, Patient to call before getting OOB, Teach patient to arise slowly    Elimination Interventions: Call light in reach, Patient to call for help with toileting needs, Stay With Me (per policy), Toileting schedule/hourly rounds              Problem: Patient Education: Go to Patient Education Activity  Goal: Patient/Family Education  Outcome: Resolved/Met

## 2020-10-15 NOTE — DISCHARGE INSTRUCTIONS
[facility] Post Hospital/ED Visit Follow-Up Instructions/Information    You may have an in home follow up visit set up with [facility] or may wish to contact [facility] to set-up a visit:    What are we? [facility] is an in-home urgent care service staffed with emergency trained medical teams. We come to your home in a vehicle stocked with medical supplies and technology. An ER physician is always available if needed. When? As a part of your hospital follow-up, an appointment has been/ or can be set up for us to come see you. Usually, this will be 24-72 hours after you leave the hospital or as needed. [facility] is open 7am-9pm, 7 days a week, 365 days a year, including holidays. Why? We know that you cannot always get to your doctor after being in the hospital and that your doctor is not always available when you need them. Once your workup is complete, we'll call in your prescriptions, update your family doctor, and handle billing with your insurance so you can focus on feeling better, faster without leaving home. How much? We accept most major health insurance plans, including Medicaid, Medicare, and Medicare Advantage [address] [place] [address], [facility], and [employer]. We also accept: credit, debit, health savings account (HSA), health reimbursement account (HRA) and flexible spending account (FSA) payments. [facility]'s prices compare to conventional urgent care facilities, but we bring the care to you. How to reach us? Getting care is easy- use our mobile jhony (Sqrl), website ([email or web address]) or call us [phone]. Cardiac Catheterization/Angiography Discharge Instructions    *Check the puncture site frequently for swelling or bleeding. If you see any bleeding, lie down and apply pressure over the area and call [phone]. Notify your doctor for any redness, swelling, drainage or oozing from the puncture site.  Notify your doctor for any fever or chills. *If the leg or arm with the puncture becomes cold, numb or painful, call Dr Evans Cluster    *Activity should be limited for the next 48 hours. Climb stairs as little as possible and avoid any stooping, bending or strenuous activity for 48 hours. No heavy lifting (anything over 10 pounds) for five days. *Do not drive for 24 hours. *You may resume your usual diet. Drink more fluids than usual.    *Have a responsible person drive you home and stay with you for at least 24 hours after your heart catheterization/angiography. *You may remove the bandage from your groin in 24 hours. You may shower in 24 hours. No tub baths, hot tubs or swimming for one week. Do not place any lotions, creams, powders, ointments over the puncture site for one week. You may place a clean band-aid over the puncture site each day for 5 days. Change this daily.

## 2020-10-15 NOTE — PROGRESS NOTES
0820: Patient with cathter placed during the procedure yesterday. Reports discomfort. Spoke to [place] NP. Orders to discontinue    0830: Patient beard discontinued. 1000: Patient voided 50mL in urinal    1035: Patient ambulated in the hallway. No complaints at this time. Assisted to the bathroom. Voided another 100 mL    1040: Orders noted for cardiac rehab. Call and spoke to nurse. Will call patient after discharge. Can discharge. Discharge instructions and prescriptions reviewed with patient. Opportunity provided for questions. Patient verbalized understanding. Signed copy of discharge placed in the front of patient's chart. IV and tele removed.

## 2020-10-15 NOTE — PROGRESS NOTES
0130- patient is laying in bed- beard care completed and pt refused CHG bath. Beard order to be clarified in the morning.

## 2020-10-16 ENCOUNTER — PATIENT OUTREACH (OUTPATIENT)
Dept: CASE MANAGEMENT | Age: 80
End: 2020-10-16

## 2020-10-16 NOTE — PROGRESS NOTES
Patient was admitted to [facility] on 10/14 and discharged on 10/15 for CAD/s/p cath. Patient was contacted within 1 business days of discharge. Top Discharge Challenges to be reviewed by the provider   Additional needs identified to be addressed with provider no  none  Discussed COVID-19 related testing which was not done at this time. Test results were not done. Patient informed of results, if available? na   Method of communication with provider : chart routing       Advance Care Planning:   Does patient have an Advance Directive:  health care decision makers updated    Inpatient Readmission Risk score: 32  Was this a readmission? no     Patients top risk factors for readmission: none identified at this time      Care Transition Nurse (CTN) contacted the patient by telephone to perform post hospital discharge assessment. Verified name and  with patient as identifiers. Provided introduction to self, and explanation of the CTN role. CTN reviewed discharge instructions, medical action plan and red flags with patient who verbalized understanding. Patient given an opportunity to ask questions and does not have any further questions or concerns at this time. The patient agrees to contact the PCP office for questions related to their healthcare. Medication reconciliation was performed with patient, who verbalizes understanding of administration of home medications. Advised obtaining a 90-day supply of all daily and as-needed medications. Referral to Pharm D needed: no     Home Health/Outpatient orders at discharge: none    Covid Risk Education    Patient has following risk factors of: diabetes. Education provided regarding infection prevention, and signs and symptoms of COVID-19 and when to seek medical attention with patient who verbalized understanding.  Discussed exposure protocols and quarantine From CDC: Are you at higher risk for severe illness?  and given an opportunity for questions and concerns. The patient agrees to contact the COVID-19 hotline [phone] or PCP office for questions related to COVID-19. For more information on steps you can take to protect yourself, see CDC's How to Protect Yourself     Patient/family/caregiver given information for [facility] and agrees to enroll no  Patient's preferred e-mail: declines  Patient's preferred phone number: declines    Discussed follow-up appointments. If no appointment was previously scheduled, appointment scheduling offered: appt made by [facility] follow up appointment(s):   Future Appointments   Date Time Provider Mal Gross   10/20/2020  9:20 AM Colletta Pale, MD [facility] BS AMB   11/4/2020 11:20 AM Roberto Strauss MD [facility] BS AMB     Non-[facility] follow up appointment(s): na  Plan for follow-up call in 10-14 days based on severity of symptoms and risk factors. CTN provided contact information for future needs. Goals Addressed                 This Visit's Progress     Prevent complications post hospitalization. 10/16/20  CTN spoke to patient to review discharge instructions/red falgs to prevent readmission. Appts:    PCP 10/20/2020 at 9:20 Patient aware and will attend. Cards Dr Benedict---11/4/20 Patient aware and will attend.    ---Patient reports he is feeling well. Denies CP, SOB. Patient reports cath site \"looks good\", denies s/sx infection, bleeding, hematoma. CTN reviewed with patient concerns to report to MD and when to call 911. Patient verbalizes understanding of instructions. ---Patient reports that he picked up both new medications yesterday and is taking as directed. Patient had no questions regarding medications at this time. ---CTN encouraged and reviewed AHA diet with patient and encouraged weight loss. Patient had no additional questions or concerns at this time. CTN will follow up with patient in 10--14 days. ---manda

## 2020-10-20 ENCOUNTER — HOSPITAL ENCOUNTER (OUTPATIENT)
Dept: LAB | Age: 80
Discharge: HOME | End: 2020-10-20
Payer: MEDICARE

## 2020-10-20 ENCOUNTER — OFFICE VISIT (OUTPATIENT)
Dept: FAMILY MEDICINE CLINIC | Age: 80
End: 2020-10-20
Payer: MEDICARE

## 2020-10-20 VITALS
HEIGHT: 69 IN | WEIGHT: 207.8 LBS | HEART RATE: 62 BPM | SYSTOLIC BLOOD PRESSURE: 140 MMHG | BODY MASS INDEX: 30.78 KG/M2 | OXYGEN SATURATION: 97 % | DIASTOLIC BLOOD PRESSURE: 60 MMHG | RESPIRATION RATE: 20 BRPM | TEMPERATURE: 97.1 F

## 2020-10-20 DIAGNOSIS — E78.2 MIXED HYPERLIPIDEMIA: ICD-10-CM

## 2020-10-20 DIAGNOSIS — E11.21 TYPE 2 DIABETES WITH NEPHROPATHY (HCC): ICD-10-CM

## 2020-10-20 DIAGNOSIS — Z95.5 S/P DRUG ELUTING CORONARY STENT PLACEMENT: ICD-10-CM

## 2020-10-20 DIAGNOSIS — I25.83 CORONARY ARTERY DISEASE DUE TO LIPID RICH PLAQUE: ICD-10-CM

## 2020-10-20 DIAGNOSIS — I25.10 CORONARY ARTERY DISEASE DUE TO LIPID RICH PLAQUE: ICD-10-CM

## 2020-10-20 DIAGNOSIS — G47.33 OSA (OBSTRUCTIVE SLEEP APNEA): ICD-10-CM

## 2020-10-20 DIAGNOSIS — I10 ESSENTIAL HYPERTENSION, BENIGN: ICD-10-CM

## 2020-10-20 DIAGNOSIS — Z23 NEEDS FLU SHOT: ICD-10-CM

## 2020-10-20 DIAGNOSIS — Z00.00 MEDICARE ANNUAL WELLNESS VISIT, INITIAL: Primary | ICD-10-CM

## 2020-10-20 LAB
GLUCOSE POC: 121 MG/DL
HBA1C MFR BLD HPLC: 7.1 %

## 2020-10-20 PROCEDURE — G8754 DIAS BP LESS 90: HCPCS | Performed by: FAMILY MEDICINE

## 2020-10-20 PROCEDURE — 90694 VACC AIIV4 NO PRSRV 0.5ML IM: CPT

## 2020-10-20 PROCEDURE — G8417 CALC BMI ABV UP PARAM F/U: HCPCS | Performed by: FAMILY MEDICINE

## 2020-10-20 PROCEDURE — G0439 PPPS, SUBSEQ VISIT: HCPCS | Performed by: FAMILY MEDICINE

## 2020-10-20 PROCEDURE — 82947 ASSAY GLUCOSE BLOOD QUANT: CPT | Performed by: FAMILY MEDICINE

## 2020-10-20 PROCEDURE — 3051F HG A1C>EQUAL 7.0%<8.0%: CPT | Performed by: FAMILY MEDICINE

## 2020-10-20 PROCEDURE — 83036 HEMOGLOBIN GLYCOSYLATED A1C: CPT | Performed by: FAMILY MEDICINE

## 2020-10-20 PROCEDURE — G8536 NO DOC ELDER MAL SCRN: HCPCS | Performed by: FAMILY MEDICINE

## 2020-10-20 PROCEDURE — 80053 COMPREHEN METABOLIC PANEL: CPT

## 2020-10-20 PROCEDURE — 80061 LIPID PANEL: CPT

## 2020-10-20 PROCEDURE — 99213 OFFICE O/P EST LOW 20 MIN: CPT | Performed by: FAMILY MEDICINE

## 2020-10-20 PROCEDURE — G0463 HOSPITAL OUTPT CLINIC VISIT: HCPCS | Performed by: FAMILY MEDICINE

## 2020-10-20 PROCEDURE — G8510 SCR DEP NEG, NO PLAN REQD: HCPCS | Performed by: FAMILY MEDICINE

## 2020-10-20 PROCEDURE — G8753 SYS BP > OR = 140: HCPCS | Performed by: FAMILY MEDICINE

## 2020-10-20 PROCEDURE — 1101F PT FALLS ASSESS-DOCD LE1/YR: CPT | Performed by: FAMILY MEDICINE

## 2020-10-20 PROCEDURE — G8427 DOCREV CUR MEDS BY ELIG CLIN: HCPCS | Performed by: FAMILY MEDICINE

## 2020-10-20 NOTE — PROGRESS NOTES
This is the Subsequent Medicare Annual Wellness Exam, performed 12 months or more after the Initial AWV or the last Subsequent AWV    I have reviewed the patient's medical history in detail and updated the computerized patient record. 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    Colon cancer ([facility] C18.9    GEORGES (obstructive sleep apnea) G47.33    Mixed hyperlipidemia E78.2    Carotid stenosis I65.29    Encounter for long-term (current) use of other medications Z79.899    Diabetic retinopathy, background ([facility] E11.3299    Spondylolisthesis of lumbar region M43.16    DM (diabetes mellitus) type II controlled, neurological manifestation ([facility] E11.49    Essential hypertension, benign I10    Type 2 diabetes with nephropathy ([facility] E11.21    CAD (coronary artery disease) I25.10     Past Medical History:   Diagnosis Date    Arthritis     Colon cancer ([facility] 1997    Diabetes ([facility]     Diverticular disease of colon 4/5/2010    DM (diabetes mellitus) type II controlled, neurological manifestation ([facility] 12/7/2014    Encounter for long-term (current) use of other medications     Hypertension     Mixed hyperlipidemia 04/05/2010    pt reports medication was started due to history of DM    Obesity     GEORGES (obstructive sleep apnea) 4/5/2010    Unspecified sleep apnea     O2 AT NIGHT 2L, CANNOT USE CPAP MASK      Past Surgical History:   Procedure Laterality Date    HX COLECTOMY  6/1997    RESECTION    HX KNEE ARTHROSCOPY Left 1971    HX KNEE ARTHROSCOPY Right 1991    HX LUMBAR LAMINECTOMY  2012    HX OTHER SURGICAL  1971    CIRCUMCISION, VASECTOMY    HX WISDOM TEETH EXTRACTION  1980     Current Outpatient Medications   Medication Sig Dispense Refill    spironolactone (ALDACTONE) 25 mg tablet Take 1 Tab by mouth daily. 30 Tab 5    ticagrelor (BRILINTA) 90 mg tablet Take 1 Tab by mouth every twelve (12) hours every twelve (12) hours.  60 Tab 11    atorvastatin (LIPITOR) 40 mg tablet Take 1 Tab by mouth nightly. 30 Tab 2    bumetanide (BUMEX) 1 mg tablet Take 1 Tab by mouth daily. Take 1mg daily with extra 0.5mg in the afternoon as needed for leg swelling. 45 Tab 0    metFORMIN (GLUCOPHAGE) 850 mg tablet Take  by mouth two (2) times daily (with meals).  amLODIPine (NORVASC) 10 mg tablet Take 1 Tab by mouth daily. 30 Tab 12    glucose blood VI test strips (OneTouch Ultra Test) strip Test Blood Sugar once daily Dx. Code E11.49 150 Strip 3    Slow Fe 142 mg (45 mg iron) ER tablet TAKE 1 TABLET BY MOUTH TWICE A DAY      glipiZIDE (GLUCOTROL) 10 mg tablet TAKE 1 TABLET TWICE A  Tab 3    quinapril (ACCUPRIL) 10 mg tablet TAKE 1 TABLET DAILY 90 Tab 3    glucose blood VI test strips (ASCENSIA AUTODISC VI, ONE TOUCH ULTRA TEST VI) strip by Does Not Apply route daily. 35 Strip 11    Oxygen-Air Delivery Systems 2 lpm at night 1 Each 0    aspirin 81 mg tablet Take 81 mg by mouth. Allergies   Allergen Reactions    Lortab [Hydrocodone-Acetaminophen] Other (comments)     Nightmares       Family History   Problem Relation Age of Onset    Stroke Mother     Heart Disease Father     Diabetes Father     Stroke Brother     Heart Disease Brother     Other Brother         AAA    Heart Disease Brother     Other Brother         AAA    Other Brother         anuerysms in legs and AAA, prediabetic    No Known Problems Daughter     No Known Problems Sister     Heart Disease Brother     Other Brother         AAA    Other Brother         Lyme disease?, prediabetic     Social History     Tobacco Use    Smoking status: Former Smoker     Packs/day: 1.50     Years: 25.00     Pack years: 37.50     Last attempt to quit: 1982     Years since quittin.7    Smokeless tobacco: Former User     Quit date: 2005   Substance Use Topics    Alcohol use:  Yes     Alcohol/week: 7.0 standard drinks     Types: 7 Glasses of wine per week Depression Risk Factor Screening:     3 most recent PHQ Screens 10/20/2020   Little interest or pleasure in doing things Several days   Feeling down, depressed, irritable, or hopeless Several days   Total Score PHQ 2 2       Alcohol Risk Screen   Do you average more than 1 drink per night or more than 7 drinks a week: No    In the past three months have you have had more than 4 drinks containing alcohol on one occasion: No        Functional Ability and Level of Safety:   Hearing: Hearing is good. Activities of Daily Living: The home contains: handrails  Patient does total self care     Ambulation: with no difficulty     Fall Risk:  Fall Risk Assessment, last 12 mths 10/20/2020   Able to walk? Yes   Fall in past 12 months? No     Abuse Screen:  Patient is not abused       Cognitive Screening   Has your family/caregiver stated any concerns about your memory: no     Cognitive Screening: Normal - 0    Patient Care Team   Patient Care Team:  Angelito Barrett MD as PCP - General  Angelito Barrett MD as PCP - [facility]  Ale Hooks RN as Care Transitions Nurse  Leah Goodpasture, MD (Cardiology)    Assessment/Plan   Education and counseling provided:  Are appropriate based on today's review and evaluation    Diagnoses and all orders for this visit:    1. Medicare annual wellness visit, initial    2. Coronary artery disease due to lipid rich plaque    3. S/P drug eluting coronary stent placement    4. Type 2 diabetes with nephropathy ([address]    5. Essential hypertension, benign    6. Mixed hyperlipidemia    7.  GEORGES (obstructive sleep apnea)    Other orders  -     LIPID PANEL  -     METABOLIC PANEL, COMPREHENSIVE  -     AMB POC GLUCOSE, QUANTITATIVE, BLOOD  -     AMB POC HEMOGLOBIN A1C        Health Maintenance Due   Topic Date Due    Shingrix Vaccine Age 49> (1 of 2) 03/25/1990    GLAUCOMA SCREENING Q2Y  11/15/2019    Eye Exam Retinal or Dilated  11/15/2019    Medicare Yearly Exam  01/10/2020    Flu Vaccine (1) 09/01/2020

## 2020-10-20 NOTE — PROGRESS NOTES
Chief Complaint   Patient presents with   Charnalini Plata Annual Wellness Visit     medicare wellness     1. Have you been to the ER, urgent care clinic since your last visit? Hospitalized since your last visit? yes [facility] Stent placement    2. Have you seen or consulted any other health care providers outside of the [address] [place] [address] since your last visit? Include any pap smears or colon screening.  no

## 2020-10-20 NOTE — PROGRESS NOTES
HISTORY OF PRESENT ILLNESS  Almaz Chester is a [de-identified] y.o. male. [facility] . Doing wel  t. No chest pain. F/U [facility] Follow Up   The history is provided by the patient. This is a new problem. The current episode started more than 2 days ago. The problem occurs daily. The problem has been rapidly improving. Associated symptoms include shortness of breath. Pertinent negatives include no chest pain. Shortness of Breath   The history is provided by the patient. This is a new problem. The problem occurs frequently. The problem has been resolved. Pertinent negatives include no fever, no PND, no orthopnea, no chest pain, no leg swelling and no claudication. Diabetes   The history is provided by the patient. This is a chronic problem. The problem occurs daily. The problem has not changed since onset. Associated symptoms include shortness of breath. Pertinent negatives include no chest pain. Other   Associated symptoms include shortness of breath. Pertinent negatives include no chest pain. Review of Systems   Constitutional: Negative for fever and malaise/fatigue. HENT: Negative for hearing loss. Respiratory: Positive for shortness of breath. Cardiovascular: Negative for chest pain, palpitations, orthopnea, claudication, leg swelling and PND. Physical Exam  Constitutional:       Appearance: Normal appearance. HENT:      Head: Normocephalic and atraumatic. Right Ear: Tympanic membrane normal.      Left Ear: Tympanic membrane normal.      Nose: Nose normal.      Mouth/Throat:      Mouth: Mucous membranes are moist.   Neck:      Musculoskeletal: Normal range of motion and neck supple. Cardiovascular:      Rate and Rhythm: Normal rate and regular rhythm. Heart sounds: Normal heart sounds. Pulmonary:      Effort: Pulmonary effort is normal.      Breath sounds: Normal breath sounds. Abdominal:      General: Abdomen is flat.       Palpations: Abdomen is soft. Musculoskeletal:      Right lower leg: No edema. Left lower leg: No edema. Neurological:      Mental Status: He is alert. Diagnoses and all orders for this visit:    1. Medicare annual wellness visit, initial    2. Coronary artery disease due to lipid rich plaque    3. S/P drug eluting coronary stent placement    4. Type 2 diabetes with nephropathy (HCC)  -     AMB POC GLUCOSE, QUANTITATIVE, BLOOD  -     AMB POC HEMOGLOBIN A1C    5. Essential hypertension, benign  -     METABOLIC PANEL, COMPREHENSIVE    6. Mixed hyperlipidemia  -     LIPID PANEL    7. GEORGES (obstructive sleep apnea)    8. Needs flu shot  -     FLU (FLUAD QUAD INFLUENZA VACCINE,QUAD,ADJUVANTED)      Follow-up and Dispositions    · Return in about 3 months (around 1/20/2021).

## 2020-10-21 LAB
ALBUMIN SERPL-MCNC: 4.8 G/DL (ref 3.7–4.7)
ALBUMIN/GLOB SERPL: 1.9 {RATIO} (ref 1.2–2.2)
ALP SERPL-CCNC: 86 IU/L (ref 39–117)
ALT SERPL-CCNC: 21 IU/L (ref 0–44)
AST SERPL-CCNC: 28 IU/L (ref 0–40)
BILIRUB SERPL-MCNC: 0.6 MG/DL (ref 0–1.2)
BUN SERPL-MCNC: 25 MG/DL (ref 8–27)
BUN/CREAT SERPL: 26 (ref 10–24)
CALCIUM SERPL-MCNC: 10.1 MG/DL (ref 8.6–10.2)
CHLORIDE SERPL-SCNC: 99 MMOL/L (ref 96–106)
CHOLEST SERPL-MCNC: 121 MG/DL (ref 100–199)
CO2 SERPL-SCNC: 26 MMOL/L (ref 20–29)
CREAT SERPL-MCNC: 0.97 MG/DL (ref 0.76–1.27)
GLOBULIN SER CALC-MCNC: 2.5 G/DL (ref 1.5–4.5)
GLUCOSE SERPL-MCNC: 115 MG/DL (ref 65–99)
HDLC SERPL-MCNC: 41 MG/DL
INTERPRETATION, 910389: NORMAL
LDLC SERPL CALC-MCNC: 58 MG/DL (ref 0–99)
POTASSIUM SERPL-SCNC: 4.9 MMOL/L (ref 3.5–5.2)
PROT SERPL-MCNC: 7.3 G/DL (ref 6–8.5)
SODIUM SERPL-SCNC: 139 MMOL/L (ref 134–144)
TRIGL SERPL-MCNC: 120 MG/DL (ref 0–149)
VLDLC SERPL CALC-MCNC: 22 MG/DL (ref 5–40)

## 2020-10-28 DIAGNOSIS — I50.32 CHRONIC HEART FAILURE WITH PRESERVED EJECTION FRACTION (HCC): ICD-10-CM

## 2020-10-29 RX ORDER — BUMETANIDE 1 MG/1
TABLET ORAL
Qty: 45 TAB | Refills: 0 | Status: SHIPPED | OUTPATIENT
Start: 2020-10-29 | End: 2020-12-02

## 2020-11-04 ENCOUNTER — OFFICE VISIT (OUTPATIENT)
Dept: CARDIOLOGY CLINIC | Age: 80
End: 2020-11-04
Payer: MEDICARE

## 2020-11-04 VITALS
DIASTOLIC BLOOD PRESSURE: 68 MMHG | HEIGHT: 69 IN | HEART RATE: 67 BPM | BODY MASS INDEX: 31.1 KG/M2 | SYSTOLIC BLOOD PRESSURE: 138 MMHG | WEIGHT: 210 LBS | OXYGEN SATURATION: 93 %

## 2020-11-04 DIAGNOSIS — I25.118 CORONARY ARTERY DISEASE OF NATIVE HEART WITH STABLE ANGINA PECTORIS, UNSPECIFIED VESSEL OR LESION TYPE (HCC): Primary | ICD-10-CM

## 2020-11-04 DIAGNOSIS — E78.2 MIXED HYPERLIPIDEMIA: ICD-10-CM

## 2020-11-04 DIAGNOSIS — I10 ESSENTIAL HYPERTENSION, BENIGN: ICD-10-CM

## 2020-11-04 PROCEDURE — G8427 DOCREV CUR MEDS BY ELIG CLIN: HCPCS | Performed by: SPECIALIST

## 2020-11-04 PROCEDURE — G8536 NO DOC ELDER MAL SCRN: HCPCS | Performed by: SPECIALIST

## 2020-11-04 PROCEDURE — G0463 HOSPITAL OUTPT CLINIC VISIT: HCPCS | Performed by: SPECIALIST

## 2020-11-04 PROCEDURE — 1101F PT FALLS ASSESS-DOCD LE1/YR: CPT | Performed by: SPECIALIST

## 2020-11-04 PROCEDURE — G8752 SYS BP LESS 140: HCPCS | Performed by: SPECIALIST

## 2020-11-04 PROCEDURE — G8754 DIAS BP LESS 90: HCPCS | Performed by: SPECIALIST

## 2020-11-04 PROCEDURE — G8432 DEP SCR NOT DOC, RNG: HCPCS | Performed by: SPECIALIST

## 2020-11-04 PROCEDURE — G8417 CALC BMI ABV UP PARAM F/U: HCPCS | Performed by: SPECIALIST

## 2020-11-04 PROCEDURE — 99214 OFFICE O/P EST MOD 30 MIN: CPT | Performed by: SPECIALIST

## 2020-11-04 RX ORDER — CLOPIDOGREL BISULFATE 75 MG/1
TABLET ORAL
Qty: 93 TAB | Refills: 0 | Status: SHIPPED | OUTPATIENT
Start: 2020-11-04 | End: 2021-01-30

## 2020-11-04 RX ORDER — CLOPIDOGREL BISULFATE 75 MG/1
75 TABLET ORAL DAILY
Qty: 93 TAB | Refills: 3 | Status: SHIPPED | OUTPATIENT
Start: 2020-11-04 | End: 2020-11-04 | Stop reason: SDUPTHER

## 2020-11-04 NOTE — PROGRESS NOTES
Fax received form [facility] stating that his insurance would only cover 90 pills for 90 days. Resubmitted Plavix rx.

## 2020-11-04 NOTE — PROGRESS NOTES
Vladimir Haas MD. [facility]              Patient: Alvaro Landry  : 1940      Today's Date: 2020          HISTORY OF PRESENT ILLNESS:     History of Present Illness:  He is feeling better after PCI. His breathing is better and he can walk more. No CP. PAST MEDICAL HISTORY:     Past Medical History:   Diagnosis Date    Arthritis     CAD (coronary artery disease)     [facility]     Diabetes ([facility]     Diverticular disease of colon 2010    DM (diabetes mellitus) type II controlled, neurological manifestation ([address] 2014    Encounter for long-term (current) use of other medications     Hypertension     Mixed hyperlipidemia 2010    pt reports medication was started due to history of DM    Obesity     GEORGES (obstructive sleep apnea) 2010    Unspecified sleep apnea     O2 AT NIGHT 2L, CANNOT USE CPAP MASK       Past Surgical History:   Procedure Laterality Date    HX COLECTOMY  1997    RESECTION    HX KNEE ARTHROSCOPY Left     HX KNEE ARTHROSCOPY Right     HX LUMBAR LAMINECTOMY      HX OTHER SURGICAL      CIRCUMCISION, VASECTOMY    HX WISDOM TEETH EXTRACTION             MEDICATIONS:     Current Outpatient Medications   Medication Sig Dispense Refill    bumetanide (BUMEX) 1 mg tablet TAKE 1 TABLET BY MOUTH EVERY DAY WITH EXTRA 1/2 TABLET IN AFTERNOON AS NEEDED FOR LEG SWELLING (Patient taking differently: Take 1 mg by mouth daily.) 45 Tab 0    spironolactone (ALDACTONE) 25 mg tablet Take 1 Tab by mouth daily. 30 Tab 5    ticagrelor (BRILINTA) 90 mg tablet Take 1 Tab by mouth every twelve (12) hours every twelve (12) hours. 60 Tab 11    atorvastatin (LIPITOR) 40 mg tablet Take 1 Tab by mouth nightly. 30 Tab 2    metFORMIN (GLUCOPHAGE) 850 mg tablet Take  by mouth two (2) times daily (with meals).  amLODIPine (NORVASC) 10 mg tablet Take 1 Tab by mouth daily.  30 Tab 12    glucose blood VI test strips (OneTouch Ultra Test) strip Test Blood Sugar once daily Dx. Code E11.49 150 Strip 3    Slow Fe 142 mg (45 mg iron) ER tablet TAKE 1 TABLET BY MOUTH TWICE A DAY      glipiZIDE (GLUCOTROL) 10 mg tablet TAKE 1 TABLET TWICE A  Tab 3    quinapril (ACCUPRIL) 10 mg tablet TAKE 1 TABLET DAILY 90 Tab 3    glucose blood VI test strips (ASCENSIA AUTODISC VI, ONE TOUCH ULTRA TEST VI) strip by Does Not Apply route daily. 35 Strip 11    Oxygen-Air Delivery Systems 2 lpm at night 1 Each 0    aspirin 81 mg tablet Take 81 mg by mouth. Allergies   Allergen Reactions    Lortab [Hydrocodone-Acetaminophen] Other (comments)     Nightmares           SOCIAL HISTORY:     Social History     Tobacco Use    Smoking status: Former Smoker     Packs/day: 1.50     Years: 25.00     Pack years: 37.50     Last attempt to quit: 1982     Years since quittin.7    Smokeless tobacco: Former User     Quit date: 2005   Substance Use Topics    Alcohol use: Yes     Alcohol/week: 7.0 standard drinks     Types: 7 Glasses of wine per week    Drug use: No         FAMILY HISTORY:     Family History   Problem Relation Age of Onset    Stroke Mother     Heart Disease Father     Diabetes Father     Stroke Brother     Heart Disease Brother     Other Brother         AAA    Heart Disease Brother     Other Brother         AAA    Other Brother         anuerysms in legs and AAA, prediabetic    No Known Problems Daughter     No Known Problems Sister     Heart Disease Brother     Other Brother         AAA    Other Brother         Lyme disease?, prediabetic          REVIEW OF SYMPTOMS:      Review of Symptoms:  Constitutional: Negative for fever, chills  HEENT: Negative for nosebleeds, tinnitus, and vision changes. Respiratory: Negative for cough, wheezing  Cardiovascular: Negative for orthopnea, claudication, syncope, and PND. Gastrointestinal: Negative for abdominal pain, diarrhea, melena.    Genitourinary: Negative for dysuria  Musculoskeletal: Negative for myalgias. Skin: Negative for rash  Heme: No problems bleeding. Neurological: Negative for speech change and focal weakness.            PHYSICAL EXAM:      Physical Exam:  Visit Vitals  /68 (BP 1 Location: Left arm, BP Patient Position: Sitting)   Pulse 67   Ht 5' 9\" (1.753 m)   Wt 210 lb (95.3 kg)   SpO2 93%   BMI 31.01 kg/m²       Patient appears generally well, mood and affect are appropriate and pleasant. HEENT:  Hearing intact, non-icteric, normocephalic, atraumatic. Neck Exam: Supple, No JVD   Lung Exam: Clear to auscultation, even breath sounds. Cardiac Exam: Regular rate and rhythm with no murmur or rub  Abdomen: Soft, non-tender, normal bowel sounds. Obese   Extremities: Moves all ext well. No lower extremity edema. Psych: Appropriate affect  Neuro - Grossly intact        LABS / OTHER STUDIES:           Lab Results   Component Value Date/Time    Sodium 139 10/20/2020 10:00 AM    Potassium 4.9 10/20/2020 10:00 AM    Chloride 99 10/20/2020 10:00 AM    CO2 26 10/20/2020 10:00 AM    Anion gap 3 (L) 10/15/2020 04:03 AM    Glucose 115 (H) 10/20/2020 10:00 AM    BUN 25 10/20/2020 10:00 AM    Creatinine 0.97 10/20/2020 10:00 AM    BUN/Creatinine ratio 26 (H) 10/20/2020 10:00 AM    GFR est AA 85 10/20/2020 10:00 AM    GFR est non-AA 73 10/20/2020 10:00 AM    Calcium 10.1 10/20/2020 10:00 AM    Bilirubin, total 0.6 10/20/2020 10:00 AM    Alk.  phosphatase 86 10/20/2020 10:00 AM    Protein, total 7.3 10/20/2020 10:00 AM    Albumin 4.8 (H) 10/20/2020 10:00 AM    Globulin 3.4 11/09/2017 10:51 AM    A-G Ratio 1.9 10/20/2020 10:00 AM    ALT (SGPT) 21 10/20/2020 10:00 AM    AST (SGOT) 28 10/20/2020 10:00 AM     Lab Results   Component Value Date/Time    Cholesterol, total 121 10/20/2020 10:00 AM    HDL Cholesterol 41 10/20/2020 10:00 AM    LDL,Direct 119 (H) 07/06/2010 09:00 AM    LDL, calculated 99 04/17/2020 08:44 AM    LDL Chol Calc (NIH) 58 10/20/2020 10:00 AM VLDL, calculated 37 04/17/2020 08:44 AM    VLDL Cholesterol Ariel 22 10/20/2020 10:00 AM    Triglyceride 120 10/20/2020 10:00 AM    CHOL/HDL Ratio 5.4 (H) 10/13/2010 09:18 AM             CARDIAC DIAGNOSTICS:      Cardiac Evaluation Includes:     EKG 11/9/17 - NSR, 1st degree AVB, RBBB  EKG 6/29/20 - NSR, 1st degree AVB, RBBB       Went to ED with SOB on 3/14/20 --> treated for CHF based on symptoms   Labs 3/14/20 - trop neg, BMP OK, Hgb 10.5, proBNP 307  CXR 3/14/20 - vascular congestion   Echo 3/14/20 ([facility]) - suboptimal study - unable to use Definity. LVEF 55%     Echo 8/14/20 - LVEF 60-65%, grade 3 diastology, AV calcifications, mod LAE    CT Heart Scan 10/7/20 - 1. CAC Score 2426.    2. Mild emphysema. 3. Mild segmental bronchiectasis and air trapping in the left lower lobe. 4. Possible cirrhosis. Spleen at the upper limits of normal in size.       Cardiac Cath 10/14/20 - LCX 50%, IFFR 0.98. RCA 30%. mLAD with sequential 30-70% lesions -----> iFFR mLAD 0.8 --> RUBÉN to mLED , PASP 50 mmHg, PCWP 14, LVEDP 10           ASSESSMENT AND PLAN:      Assessment and Plan:    1) CAD  - RUBÉN to mLAD stenosis 10/14/20   ----> He is feeling better since PCI   - cont DAPT -- due to cost will switch from [address] to Plavix (after a load)   - cont statin     2) HTN  - BP looks better at home -- continue meds      3)  He says he had CHF treated in March 2020 at [address] - says his LVEF was normal   - He seems volume compensated  - has chronic class 2 ANGELES  - continue Bumex / aldactone      4) Dyslipidemia  - prior lipids OK      5) GEORGES - is on home O2 at night - he does not want to get tested for a CPAP    6) See me in 6 months. Patient expressed understanding of the plan - questions were answered.         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   Has not spoken to his one daughter in 4+ years.          Roberta Johnson MD, [facility] [address]  [address] [place] [address], [place] Rocha [address] [place] [address]  Ph: [phone]                               Ph [phone]

## 2020-11-04 NOTE — PROGRESS NOTES
Chief Complaint   Patient presents with    CHF     RBBB    Hypertension    Cholesterol Problem    Shortness of Breath     Visit Vitals  /68 (BP 1 Location: Left arm, BP Patient Position: Sitting)   Pulse 67   Ht 5' 9\" (1.753 m)   Wt 210 lb (95.3 kg)   SpO2 93%   BMI 31.01 kg/m²     Chest pain denied   SOB denied   Palpitations denied   Swelling in hands/feet denied   Dizziness denied   Recent hospital stays denied   Refills denied

## 2020-11-06 ENCOUNTER — PATIENT OUTREACH (OUTPATIENT)
Dept: CASE MANAGEMENT | Age: 80
End: 2020-11-06

## 2020-11-06 NOTE — PROGRESS NOTES
Goals      Prevent complications post hospitalization. 10/16/20  CTN spoke to patient to review discharge instructions/red falgs to prevent readmission. Appts:    PCP 10/20/2020 at 9:20 Patient aware and will attend. Cards Dr Benedict---11/4/20 Patient aware and will attend.    ---Patient reports he is feeling well. Denies CP, SOB. Patient reports cath site \"looks good\", denies s/sx infection, bleeding, hematoma. CTN reviewed with patient concerns to report to MD and when to call 911. Patient verbalizes understanding of instructions. ---Patient reports that he picked up both new medications yesterday and is taking as directed. Patient had no questions regarding medications at this time. ---CTN encouraged and reviewed AHA diet with patient and encouraged weight loss. Patient had no additional questions or concerns at this time. CTN will follow up with patient in 10--14 days. ---mkrw    11/06/20  CTN spoke to patient briefly on phone as patient reports he is \"down in [place] right now for a few days. \"    Patient reports everything going well since his discharge, denies CP or SOB. Per chart review patient attended both cards and PCP transitional appts as scheduled. Patient had no questions or concerns for CTN at time of call. CTN educated patient on COVID19 precautions during travel for prevention of complications. Patient voices understanding.     CTN will follow up at end of JUSTINE period--rw

## 2020-11-24 ENCOUNTER — TELEPHONE (OUTPATIENT)
Dept: FAMILY MEDICINE CLINIC | Age: 80
End: 2020-11-24

## 2020-11-24 DIAGNOSIS — G47.33 OSA (OBSTRUCTIVE SLEEP APNEA): Primary | ICD-10-CM

## 2020-11-24 NOTE — TELEPHONE ENCOUNTER
Per [facility] patient needs to have an overnight oxygen test performed, order will be faxed to [phone]

## 2020-12-01 DIAGNOSIS — I50.32 CHRONIC HEART FAILURE WITH PRESERVED EJECTION FRACTION (HCC): ICD-10-CM

## 2020-12-02 RX ORDER — BUMETANIDE 1 MG/1
1 TABLET ORAL DAILY
Qty: 90 TAB | Refills: 0 | Status: SHIPPED | OUTPATIENT
Start: 2020-12-02 | End: 2021-02-23 | Stop reason: ALTCHOICE

## 2020-12-15 ENCOUNTER — TELEPHONE (OUTPATIENT)
Dept: CARDIAC REHAB | Age: 80
End: 2020-12-15

## 2021-01-05 ENCOUNTER — TELEPHONE (OUTPATIENT)
Dept: FAMILY MEDICINE CLINIC | Age: 81
End: 2021-01-05

## 2021-01-30 RX ORDER — CLOPIDOGREL BISULFATE 75 MG/1
75 TABLET ORAL DAILY
Qty: 90 TAB | Refills: 1 | Status: SHIPPED | OUTPATIENT
Start: 2021-01-30 | End: 2021-07-27

## 2021-01-30 NOTE — TELEPHONE ENCOUNTER
Refill per VO of Dr. Ma Siemens:  Last appt: 11/4/2020  Future Appointments   Date Time Provider Mal Gross   2/23/2021  9:40 AM Dale Ramon MD [facility] BS AMB   5/5/2021 11:40 AM MD FIDEL Velazco BS AMB       Requested Prescriptions     Signed Prescriptions Disp Refills    clopidogreL (PLAVIX) 75 mg tab 90 Tab 1     Sig: Take 1 Tab by mouth daily.      Authorizing Provider: Kusum Wong     Ordering User: Dwight Beard

## 2021-02-17 NOTE — Clinical Note
Patient advised to call if any problems, questions, or concerns. Sheath #1: Sheath: inserted. Sheath inserted/placed in the right femoral  artery. Hemostasis achieved.

## 2021-02-23 ENCOUNTER — OFFICE VISIT (OUTPATIENT)
Dept: FAMILY MEDICINE CLINIC | Age: 81
End: 2021-02-23
Payer: MEDICARE

## 2021-02-23 VITALS
RESPIRATION RATE: 20 BRPM | WEIGHT: 219.8 LBS | HEART RATE: 54 BPM | BODY MASS INDEX: 32.56 KG/M2 | HEIGHT: 69 IN | TEMPERATURE: 96.9 F | DIASTOLIC BLOOD PRESSURE: 68 MMHG | SYSTOLIC BLOOD PRESSURE: 136 MMHG | OXYGEN SATURATION: 98 %

## 2021-02-23 DIAGNOSIS — I25.10 CORONARY ARTERY DISEASE DUE TO LIPID RICH PLAQUE: ICD-10-CM

## 2021-02-23 DIAGNOSIS — I10 ESSENTIAL HYPERTENSION, BENIGN: ICD-10-CM

## 2021-02-23 DIAGNOSIS — I25.83 CORONARY ARTERY DISEASE DUE TO LIPID RICH PLAQUE: ICD-10-CM

## 2021-02-23 DIAGNOSIS — Z01.818 PREOP EXAMINATION: Primary | ICD-10-CM

## 2021-02-23 DIAGNOSIS — Z95.5 S/P DRUG ELUTING CORONARY STENT PLACEMENT: ICD-10-CM

## 2021-02-23 DIAGNOSIS — E11.21 TYPE 2 DIABETES WITH NEPHROPATHY (HCC): ICD-10-CM

## 2021-02-23 DIAGNOSIS — E78.2 MIXED HYPERLIPIDEMIA: ICD-10-CM

## 2021-02-23 LAB — HBA1C MFR BLD HPLC: 9.2 %

## 2021-02-23 PROCEDURE — G8427 DOCREV CUR MEDS BY ELIG CLIN: HCPCS | Performed by: FAMILY MEDICINE

## 2021-02-23 PROCEDURE — G8752 SYS BP LESS 140: HCPCS | Performed by: FAMILY MEDICINE

## 2021-02-23 PROCEDURE — G8754 DIAS BP LESS 90: HCPCS | Performed by: FAMILY MEDICINE

## 2021-02-23 PROCEDURE — G0463 HOSPITAL OUTPT CLINIC VISIT: HCPCS | Performed by: FAMILY MEDICINE

## 2021-02-23 PROCEDURE — G8510 SCR DEP NEG, NO PLAN REQD: HCPCS | Performed by: FAMILY MEDICINE

## 2021-02-23 PROCEDURE — 83036 HEMOGLOBIN GLYCOSYLATED A1C: CPT | Performed by: FAMILY MEDICINE

## 2021-02-23 PROCEDURE — 99213 OFFICE O/P EST LOW 20 MIN: CPT | Performed by: FAMILY MEDICINE

## 2021-02-23 PROCEDURE — 1101F PT FALLS ASSESS-DOCD LE1/YR: CPT | Performed by: FAMILY MEDICINE

## 2021-02-23 PROCEDURE — G8536 NO DOC ELDER MAL SCRN: HCPCS | Performed by: FAMILY MEDICINE

## 2021-02-23 PROCEDURE — G8417 CALC BMI ABV UP PARAM F/U: HCPCS | Performed by: FAMILY MEDICINE

## 2021-02-23 NOTE — PROGRESS NOTES
Subjective:     Marcie Singer is a [de-identified] y.o. male presenting for preoop for eye surgery. He is followed for DM2 HBP,Chol,GEORGES,CAD s/p stenting    Patient Active Problem List   Diagnosis Code    Diverticular disease of colon K57.30    Colon cancer ([facility] C18.9    GEORGES (obstructive sleep apnea) G47.33    Mixed hyperlipidemia E78.2    Carotid stenosis I65.29    Encounter for long-term (current) use of other medications Z79.899    Diabetic retinopathy, background ([address] E11.3299    Spondylolisthesis of lumbar region M43.16    DM (diabetes mellitus) type II controlled, neurological manifestation ([address] E11.49    Essential hypertension, benign I10    Type 2 diabetes with nephropathy ([address] E11.21    CAD (coronary artery disease) I25.10     Patient Active Problem List    Diagnosis Date Noted    CAD (coronary artery disease) 10/14/2020     Priority: 1 - One    Type 2 diabetes with nephropathy ([address] 09/12/2018    Essential hypertension, benign 08/11/2015    DM (diabetes mellitus) type II controlled, neurological manifestation ([address] 12/07/2014    Spondylolisthesis of lumbar region 03/08/2012    Diabetic retinopathy, background ([address] 07/24/2011    Encounter for long-term (current) use of other medications 01/10/2011    Carotid stenosis 07/15/2010    Diverticular disease of colon 04/05/2010    Colon cancer ([facility] 04/05/2010    GEORGES (obstructive sleep apnea) 04/05/2010    Mixed hyperlipidemia 04/05/2010     Current Outpatient Medications   Medication Sig Dispense Refill    clopidogreL (PLAVIX) 75 mg tab Take 1 Tab by mouth daily. 90 Tab 1    atorvastatin (LIPITOR) 40 mg tablet TAKE 1 TABLET BY MOUTH EVERY DAY AT NIGHT 90 Tab 0    spironolactone (ALDACTONE) 25 mg tablet Take 1 Tab by mouth daily. 30 Tab 5    metFORMIN (GLUCOPHAGE) 850 mg tablet Take  by mouth two (2) times daily (with meals).  amLODIPine (NORVASC) 10 mg tablet Take 1 Tab by mouth daily.  30 Tab 12    glucose blood VI test strips (OneTouch Ultra Test) strip Test Blood Sugar once daily Dx. Code E11.49 150 Strip 3    Slow Fe 142 mg (45 mg iron) ER tablet TAKE 1 TABLET BY MOUTH TWICE A DAY      glipiZIDE (GLUCOTROL) 10 mg tablet TAKE 1 TABLET TWICE A  Tab 3    quinapril (ACCUPRIL) 10 mg tablet TAKE 1 TABLET DAILY 90 Tab 3    glucose blood VI test strips (ASCENSIA AUTODISC VI, ONE TOUCH ULTRA TEST VI) strip by Does Not Apply route daily. 35 Strip 11    Oxygen-Air Delivery Systems 2 lpm at night 1 Each 0    aspirin 81 mg tablet Take 81 mg by mouth.          Allergies   Allergen Reactions    Lortab [Hydrocodone-Acetaminophen] Other (comments)     Nightmares     Past Medical History:   Diagnosis Date    Arthritis     CAD (coronary artery disease)     Colon cancer ([facility] [address] 1997    Diabetes ([facility] [facility]     Diverticular disease of colon 4/5/2010    DM (diabetes mellitus) type II controlled, neurological manifestation ([facility] 12/7/2014    Encounter for long-term (current) use of other medications     Hypertension     Mixed hyperlipidemia 04/05/2010    pt reports medication was started due to history of DM    Obesity     GEORGES (obstructive sleep apnea) 4/5/2010    Unspecified sleep apnea     O2 AT NIGHT 2L, CANNOT USE CPAP MASK     Past Surgical History:   Procedure Laterality Date    HX KNEE ARTHROSCOPY Left 1971    HX KNEE ARTHROSCOPY Right 1991    HX LUMBAR LAMINECTOMY  2012    HX OTHER SURGICAL  1971    CIRCUMCISION, VASECTOMY    HX TOTAL COLECTOMY  6/1997    RESECTION    HX WISDOM TEETH EXTRACTION  1980     Family History   Problem Relation Age of Onset    Stroke Mother     Heart Disease Father     Diabetes Father     Stroke Brother     Heart Disease Brother     Other Brother         AAA    Heart Disease Brother     Other Brother         AAA    Other Brother         anuerysms in legs and AAA, prediabetic    No Known Problems Daughter     No Known Problems Sister     Heart Disease Brother     Other Brother         AAA    Other Brother         Lyme disease?, prediabetic     Social History     Tobacco Use    Smoking status: Former Smoker     Packs/day: 1.50     Years: 25.00     Pack years: 37.50     Quit date: 1982     Years since quittin.0    Smokeless tobacco: Former User     Quit date: 2005   Substance Use Topics    Alcohol use:  Yes     Alcohol/week: 7.0 standard drinks     Types: 7 Glasses of wine per week             Review of Systems  Constitutional: negative  Eyes: positive for cataracts  Ears, nose, mouth, throat, and face: negative  Respiratory: negative  Cardiovascular: negative  Gastrointestinal: negative  Genitourinary:negative  Integument/breast: negative  Musculoskeletal:negative  Neurological: negative    Objective:     Visit Vitals  /68 (BP 1 Location: Left upper arm, BP Patient Position: Sitting, BP Cuff Size: Adult)   Pulse (!) 54   Temp 96.9 °F (36.1 °C) (Temporal)   Resp 20   Ht 5' 9\" (1.753 m)   Wt 219 lb 12.8 oz (99.7 kg)   SpO2 98%   BMI 32.46 kg/m²     Physical exam:   General appearance - alert, well appearing, and in no distress  Mental status - alert, oriented to person, place, and time  Eyes - pupils equal and reactive, extraocular eye movements intact  Ears - bilateral TM's and external ear canals normal  Nose - normal and patent, no erythema, discharge or polyps  Mouth - mucous membranes moist, pharynx normal without lesions  Neck - supple, no significant adenopathy, carotids upstroke normal bilaterally, no bruits, thyroid exam: thyroid is normal in size without nodules or tenderness  Chest - clear to auscultation, no wheezes, rales or rhonchi, symmetric air entry  Heart - normal rate, regular rhythm, normal S1, S2, no murmurs, rubs, clicks or gallops  Abdomen - soft, nontender, nondistended, no masses or organomegaly  Musculoskeletal - no joint tenderness, deformity or swelling  Extremities - peripheral pulses normal, no pedal edema, no clubbing or cyanosis  Skin - normal coloration and turgor, no rashes, no suspicious skin lesions noted     Assessment/Plan:     Preop Exam,cleared for Surgery  continue present diet with no restrictions, continue present plan, routine labs ordered. Diagnoses and all orders for this visit:    1. Preop examination    2. Type 2 diabetes with nephropathy (HCC)  -     METABOLIC PANEL, COMPREHENSIVE  -     HEMOGLOBIN A1C WITH EAG    3. Coronary artery disease due to lipid rich plaque    4. S/P drug eluting coronary stent placement    5. Essential hypertension, benign    6. Mixed hyperlipidemia  -     LIPID PANEL        .

## 2021-02-23 NOTE — PROGRESS NOTES
Chief Complaint   Patient presents with    Diabetes     F/U on diabetes.  Hypertension     F/U on BP.  Cholesterol Problem     F/U on cholesterol. \1. Have you been to the ER, urgent care clinic since your last visit? Hospitalized since your last visit? No    2. Have you seen or consulted any other health care providers outside of the [address] [place] [address] since your last visit? Include any pap smears or colon screening.  No

## 2021-03-16 NOTE — TELEPHONE ENCOUNTER
Called RYAN, she stated she would call pt to schedule.
Called pt; reviewed recent labs. Slight worsening of renal fx. Pt has been using Bumex 1.5-2mg per day based on leg swelling. States swelling well controlled with this dose. Encouraged him to use 1-1.5mg/d instead if able. Call if leg swelling returns. Pt agreed. ProBNP mildly elevated but not significantly high. CTA coronaries ordered. Since hm HR consistently in the 60, can dc atenolol pre-test.    Pt agreed.       Component      Latest Ref Rng & Units 9/21/2020 9/21/2020 9/21/2020          12:23 PM 12:23 PM 12:23 PM   Sodium      136 - 145 mmol/L 134 (L)     Potassium      3.5 - 5.1 mmol/L 5.1     Chloride      97 - 108 mmol/L 101     CO2      21 - 32 mmol/L 27     Anion gap      5 - 15 mmol/L 6     Glucose      65 - 100 mg/dL 114 (H)     BUN      6 - 20 MG/DL 31 (H)     Creatinine      0.70 - 1.30 MG/DL 1.38 (H)     BUN/Creatinine ratio      12 - 20   22 (H)     GFR est AA      >60 ml/min/1.73m2 >60     GFR est non-AA      >60 ml/min/1.73m2 50 (L)     Calcium      8.5 - 10.1 MG/DL 10.3 (H)     NT pro-BNP      <450 PG/ML   586 (H)   Magnesium      1.6 - 2.4 mg/dL  2.0
Normal

## 2021-03-23 RX ORDER — ATORVASTATIN CALCIUM 40 MG/1
TABLET, FILM COATED ORAL
Qty: 90 TAB | Refills: 0 | Status: SHIPPED | OUTPATIENT
Start: 2021-03-23 | End: 2021-06-14

## 2021-03-23 RX ORDER — SPIRONOLACTONE 25 MG/1
TABLET ORAL
Qty: 90 TAB | Refills: 1 | Status: SHIPPED | OUTPATIENT
Start: 2021-03-23 | End: 2021-10-11 | Stop reason: SDUPTHER

## 2021-03-23 NOTE — TELEPHONE ENCOUNTER
Refill per VO of Dr. Carina Crum:  Last appt: 11/4/2020  Future Appointments   Date Time Provider Mal Gross   5/5/2021 11:40 AM MD FIDEL Cavanaugh BS AMB   6/1/2021  9:40 AM Telma Garsia MD [facility] BS AMB       Requested Prescriptions     Signed Prescriptions Disp Refills    atorvastatin (LIPITOR) 40 mg tablet 90 Tab 0     Sig: TAKE 1 TABLET BY MOUTH EVERY DAY AT NIGHT     Authorizing Provider: Unique Bower     Ordering User: Lam Rodriguez

## 2021-05-05 ENCOUNTER — OFFICE VISIT (OUTPATIENT)
Dept: CARDIOLOGY CLINIC | Age: 81
End: 2021-05-05
Payer: MEDICARE

## 2021-05-05 VITALS
DIASTOLIC BLOOD PRESSURE: 62 MMHG | SYSTOLIC BLOOD PRESSURE: 146 MMHG | HEART RATE: 61 BPM | HEIGHT: 69 IN | BODY MASS INDEX: 32.29 KG/M2 | WEIGHT: 218 LBS | OXYGEN SATURATION: 95 %

## 2021-05-05 DIAGNOSIS — I10 ESSENTIAL HYPERTENSION, BENIGN: ICD-10-CM

## 2021-05-05 DIAGNOSIS — I25.10 CORONARY ARTERY DISEASE INVOLVING NATIVE HEART WITHOUT ANGINA PECTORIS, UNSPECIFIED VESSEL OR LESION TYPE: Primary | ICD-10-CM

## 2021-05-05 DIAGNOSIS — E78.2 MIXED HYPERLIPIDEMIA: ICD-10-CM

## 2021-05-05 PROCEDURE — G8417 CALC BMI ABV UP PARAM F/U: HCPCS | Performed by: SPECIALIST

## 2021-05-05 PROCEDURE — G0463 HOSPITAL OUTPT CLINIC VISIT: HCPCS | Performed by: SPECIALIST

## 2021-05-05 PROCEDURE — 99214 OFFICE O/P EST MOD 30 MIN: CPT | Performed by: SPECIALIST

## 2021-05-05 PROCEDURE — G8536 NO DOC ELDER MAL SCRN: HCPCS | Performed by: SPECIALIST

## 2021-05-05 PROCEDURE — G8753 SYS BP > OR = 140: HCPCS | Performed by: SPECIALIST

## 2021-05-05 PROCEDURE — G8427 DOCREV CUR MEDS BY ELIG CLIN: HCPCS | Performed by: SPECIALIST

## 2021-05-05 PROCEDURE — G8754 DIAS BP LESS 90: HCPCS | Performed by: SPECIALIST

## 2021-05-05 PROCEDURE — G8432 DEP SCR NOT DOC, RNG: HCPCS | Performed by: SPECIALIST

## 2021-05-05 PROCEDURE — 1101F PT FALLS ASSESS-DOCD LE1/YR: CPT | Performed by: SPECIALIST

## 2021-05-05 RX ORDER — BUMETANIDE 1 MG/1
1 TABLET ORAL AS NEEDED
Qty: 30 TAB | Refills: 0
Start: 2021-05-05 | End: 2021-10-11 | Stop reason: SDUPTHER

## 2021-05-05 NOTE — PROGRESS NOTES
Chief Complaint   Patient presents with    Follow-up     6 months    Hypertension    Shortness of Breath    Cholesterol Problem     Visit Vitals  BP (!) 146/62 (BP 1 Location: Left upper arm, BP Patient Position: Sitting, BP Cuff Size: Large adult)   Pulse 61   Ht 5' 9\" (1.753 m)   Wt 218 lb (98.9 kg)   SpO2 95%   BMI 32.19 kg/m²     Chest pain denied   SOB ANGELES better; has been gradually increasing his exercise  Palpitations denied   Swelling in hands/feet denied   Dizziness denied   Recent hospital stays denied   Refills denied     Asking about Bumex? Has stopped taking it as it was making him go to the bathroom all the time. Would love to get off of medication if he could.

## 2021-05-05 NOTE — PROGRESS NOTES
Shar Freed MD. [facility] [place]              Patient: Remi Cavanaugh  : 1940      Today's Date: 2021            HISTORY OF PRESENT ILLNESS:     History of Present Illness:  Doing OK. Gained some weight. Walking 4 x a week. No CP. Breathing is OK. BP was doing better until he gained some weight. PAST MEDICAL HISTORY:     Past Medical History:   Diagnosis Date    Arthritis     CAD (coronary artery disease)     [facility]     Diabetes [facility]     Diverticular disease of colon 2010    DM (diabetes mellitus) type II controlled, neurological manifestation ([address] 2014    Encounter for long-term (current) use of other medications     Hypertension     Mixed hyperlipidemia 2010    pt reports medication was started due to history of DM    Obesity     GEORGES (obstructive sleep apnea) 2010    Unspecified sleep apnea     O2 AT NIGHT 2L, CANNOT USE CPAP MASK         Past Surgical History:   Procedure Laterality Date    HX KNEE ARTHROSCOPY Left     HX KNEE ARTHROSCOPY Right     HX LUMBAR LAMINECTOMY      HX OTHER SURGICAL  1971    CIRCUMCISION, VASECTOMY    HX TOTAL COLECTOMY  1997    RESECTION    HX WISDOM TEETH EXTRACTION             MEDICATIONS:     Current Outpatient Medications   Medication Sig Dispense Refill    bumetanide (BUMEX) 1 mg tablet Take 1 Tab by mouth as needed (SOB, swelling). 30 Tab 0    atorvastatin (LIPITOR) 40 mg tablet TAKE 1 TABLET BY MOUTH EVERY DAY AT NIGHT 90 Tab 0    spironolactone (ALDACTONE) 25 mg tablet TAKE 1 TABLET BY MOUTH EVERY DAY 90 Tab 1    clopidogreL (PLAVIX) 75 mg tab Take 1 Tab by mouth daily. 90 Tab 1    metFORMIN (GLUCOPHAGE) 850 mg tablet Take  by mouth two (2) times daily (with meals).  amLODIPine (NORVASC) 10 mg tablet Take 1 Tab by mouth daily. 30 Tab 12    glucose blood VI test strips (OneTouch Ultra Test) strip Test Blood Sugar once daily Dx.  Code E11.49 150 Strip 3    Slow Fe 142 mg (45 mg iron) ER tablet TAKE 1 TABLET BY MOUTH TWICE A DAY      glipiZIDE (GLUCOTROL) 10 mg tablet TAKE 1 TABLET TWICE A  Tab 3    quinapril (ACCUPRIL) 10 mg tablet TAKE 1 TABLET DAILY 90 Tab 3    glucose blood VI test strips (ASCENSIA AUTODISC VI, ONE TOUCH ULTRA TEST VI) strip by Does Not Apply route daily. 35 Strip 11    Oxygen-Air Delivery Systems 2 lpm at night 1 Each 0    aspirin 81 mg tablet Take 81 mg by mouth. Allergies   Allergen Reactions    Lortab [Hydrocodone-Acetaminophen] Other (comments)     Nightmares             SOCIAL HISTORY:     Social History     Tobacco Use    Smoking status: Former Smoker     Packs/day: 1.50     Years: 25.00     Pack years: 37.50     Quit date: 1982     Years since quittin.2    Smokeless tobacco: Former User     Quit date: 2005   Substance Use Topics    Alcohol use: Yes     Alcohol/week: 7.0 standard drinks     Types: 7 Glasses of wine per week    Drug use: No         FAMILY HISTORY:     Family History   Problem Relation Age of Onset    Stroke Mother     Heart Disease Father     Diabetes Father     Stroke Brother     Heart Disease Brother     Other Brother         AAA    Heart Disease Brother     Other Brother         AAA    Other Brother         anuerysms in legs and AAA, prediabetic    No Known Problems Daughter     No Known Problems Sister     Heart Disease Brother     Other Brother         AAA    Other Brother         Lyme disease?, prediabetic            REVIEW OF SYMPTOMS:      Review of Symptoms:  Constitutional: Negative for fever, chills  HEENT: Negative for nosebleeds, tinnitus, and vision changes. Respiratory: Negative for cough, wheezing  Cardiovascular: Negative for orthopnea, claudication, syncope, and PND. Gastrointestinal: Negative for abdominal pain, diarrhea, melena. Genitourinary: Negative for dysuria  Musculoskeletal: Negative for myalgias.    Skin: Negative for rash  Heme: No problems bleeding. Neurological: Negative for speech change and focal weakness.            PHYSICAL EXAM:      Physical Exam:  Visit Vitals  BP (!) 146/62 (BP 1 Location: Left upper arm, BP Patient Position: Sitting, BP Cuff Size: Large adult)   Pulse 61   Ht 5' 9\" (1.753 m)   Wt 218 lb (98.9 kg)   SpO2 95%   BMI 32.19 kg/m²       Patient appears generally well, mood and affect are appropriate and pleasant. HEENT:  Hearing intact, non-icteric, normocephalic, atraumatic. Neck Exam: Supple, No JVD   Lung Exam: Clear to auscultation, even breath sounds. Cardiac Exam: Regular rate and rhythm with no murmur or rub  Abdomen: Soft, non-tender, normal bowel sounds. Obese   Extremities: Moves all ext well. No lower extremity edema. Psych: Appropriate affect  Neuro - Grossly intact        LABS / OTHER STUDIES:         Lab Results   Component Value Date/Time    Sodium 139 10/20/2020 10:00 AM    Potassium 4.9 10/20/2020 10:00 AM    Chloride 99 10/20/2020 10:00 AM    CO2 26 10/20/2020 10:00 AM    Anion gap 3 (L) 10/15/2020 04:03 AM    Glucose 115 (H) 10/20/2020 10:00 AM    BUN 25 10/20/2020 10:00 AM    Creatinine 0.97 10/20/2020 10:00 AM    BUN/Creatinine ratio 26 (H) 10/20/2020 10:00 AM    GFR est AA 85 10/20/2020 10:00 AM    GFR est non-AA 73 10/20/2020 10:00 AM    Calcium 10.1 10/20/2020 10:00 AM    Bilirubin, total 0.6 10/20/2020 10:00 AM    Alk.  phosphatase 86 10/20/2020 10:00 AM    Protein, total 7.3 10/20/2020 10:00 AM    Albumin 4.8 (H) 10/20/2020 10:00 AM    Globulin 3.4 11/09/2017 10:51 AM    A-G Ratio 1.9 10/20/2020 10:00 AM    ALT (SGPT) 21 10/20/2020 10:00 AM    AST (SGOT) 28 10/20/2020 10:00 AM     Lab Results   Component Value Date/Time    WBC 9.0 10/15/2020 04:03 AM    HGB 11.4 (L) 10/15/2020 04:03 AM    HCT 37.3 10/15/2020 04:03 AM    PLATELET 125 60/01/2995 04:03 AM    MCV 82.2 10/15/2020 04:03 AM       Lab Results   Component Value Date/Time    Cholesterol, total 121 10/20/2020 10:00 AM    HDL Cholesterol 41 10/20/2020 10:00 AM    LDL,Direct 119 (H) 07/06/2010 09:00 AM    LDL, calculated 58 10/20/2020 10:00 AM    LDL, calculated 99 04/17/2020 08:44 AM    VLDL, calculated 22 10/20/2020 10:00 AM    VLDL, calculated 37 04/17/2020 08:44 AM    Triglyceride 120 10/20/2020 10:00 AM    CHOL/HDL Ratio 5.4 (H) 10/13/2010 09:18 AM             CARDIAC DIAGNOSTICS:      Cardiac Evaluation Includes:     EKG 11/9/17 - NSR, 1st degree AVB, RBBB  EKG 6/29/20 - NSR, 1st degree AVB, RBBB        Went to ED with SOB on 3/14/20 --> treated for CHF based on symptoms   Labs 3/14/20 - trop neg, BMP OK, Hgb 10.5, proBNP 307  CXR 3/14/20 - vascular congestion   Echo 3/14/20 ([facility]) - suboptimal study - unable to use Definity.  LVEF 55%     Echo 8/14/20 - LVEF 60-65%, grade 3 diastology, AV calcifications, mod LAE     CT Heart Scan 10/7/20 - 1. CAC Score 2426.    2. Mild emphysema. 3. Mild segmental bronchiectasis and air trapping in the left lower lobe. 4. Possible cirrhosis. Spleen at the upper limits of normal in size.        Cardiac Cath 10/14/20 - LCX 50%, IFFR 0.98. RCA 30%. mLAD with sequential 30-70% lesions -----> iFFR mLAD 0.8 --> RUBÉN to mLED , PASP 50 mmHg, PCWP 14, LVEDP 10            ASSESSMENT AND PLAN:      Assessment and Plan:     1) CAD  - RUBÉN to mLAD stenosis 10/14/20   ----> He is feeling better since PCI   - cont DAPT for now -- due to cost will switch from [address] to Plavix (after a load)   - cont statin   - he denies CP and is working in the yard OK      2) HTN  - BP a little higher after he gained some weight ---> he wants to work on diet / weight loss before increasing meds      3) [facility] says he had CHF treated in March 2020 at [address] - says his LVEF was normal   - He seems volume compensated  - has chronic class 2 ANGELES  - continue Bumex PRN and aldactone   - He is doing better lately      4) Dyslipidemia  - prior lipids OK   - cont statin - follow labs      5) GEORGES - on CPAP and O2 at night      6) See me in 6 months. Patient expressed understanding of the plan - questions were answered.         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  Has not spoken to his one daughter in 4+ years.          Baron Taz MD, [address] [facility] [address], [address]      [id number]  [address]  [facility], [address] [place] [address]  Ph: [phone]                                [phone]

## 2021-06-01 ENCOUNTER — OFFICE VISIT (OUTPATIENT)
Dept: FAMILY MEDICINE CLINIC | Age: 81
End: 2021-06-01
Payer: MEDICARE

## 2021-06-01 VITALS
SYSTOLIC BLOOD PRESSURE: 138 MMHG | RESPIRATION RATE: 18 BRPM | HEIGHT: 69 IN | WEIGHT: 220.2 LBS | TEMPERATURE: 98.4 F | BODY MASS INDEX: 32.61 KG/M2 | DIASTOLIC BLOOD PRESSURE: 62 MMHG | HEART RATE: 65 BPM | OXYGEN SATURATION: 95 %

## 2021-06-01 DIAGNOSIS — E11.21 TYPE 2 DIABETES WITH NEPHROPATHY (HCC): Primary | ICD-10-CM

## 2021-06-01 DIAGNOSIS — I25.10 CORONARY ARTERY DISEASE DUE TO LIPID RICH PLAQUE: ICD-10-CM

## 2021-06-01 DIAGNOSIS — E78.2 MIXED HYPERLIPIDEMIA: ICD-10-CM

## 2021-06-01 DIAGNOSIS — D50.8 OTHER IRON DEFICIENCY ANEMIA: ICD-10-CM

## 2021-06-01 DIAGNOSIS — Z95.5 S/P DRUG ELUTING CORONARY STENT PLACEMENT: ICD-10-CM

## 2021-06-01 DIAGNOSIS — I25.83 CORONARY ARTERY DISEASE DUE TO LIPID RICH PLAQUE: ICD-10-CM

## 2021-06-01 DIAGNOSIS — G47.33 OSA (OBSTRUCTIVE SLEEP APNEA): ICD-10-CM

## 2021-06-01 DIAGNOSIS — E11.42 CONTROLLED TYPE 2 DIABETES MELLITUS WITH DIABETIC POLYNEUROPATHY, WITHOUT LONG-TERM CURRENT USE OF INSULIN (HCC): ICD-10-CM

## 2021-06-01 DIAGNOSIS — I10 ESSENTIAL HYPERTENSION, BENIGN: ICD-10-CM

## 2021-06-01 LAB
ALBUMIN SERPL-MCNC: 4.2 G/DL (ref 3.5–5)
ALBUMIN/GLOB SERPL: 1.2 {RATIO} (ref 1.1–2.2)
ALP SERPL-CCNC: 79 U/L (ref 45–117)
ALT SERPL-CCNC: 25 U/L (ref 12–78)
ANION GAP SERPL CALC-SCNC: 7 MMOL/L (ref 5–15)
AST SERPL-CCNC: 20 U/L (ref 15–37)
BILIRUB SERPL-MCNC: 0.6 MG/DL (ref 0.2–1)
BUN SERPL-MCNC: 19 MG/DL (ref 6–20)
BUN/CREAT SERPL: 19 (ref 12–20)
CALCIUM SERPL-MCNC: 10.5 MG/DL (ref 8.5–10.1)
CHLORIDE SERPL-SCNC: 104 MMOL/L (ref 97–108)
CHOLEST SERPL-MCNC: 148 MG/DL
CO2 SERPL-SCNC: 27 MMOL/L (ref 21–32)
CREAT SERPL-MCNC: 0.99 MG/DL (ref 0.7–1.3)
CREAT UR-MCNC: 114 MG/DL
EST. AVERAGE GLUCOSE BLD GHB EST-MCNC: 246 MG/DL
GLOBULIN SER CALC-MCNC: 3.5 G/DL (ref 2–4)
GLUCOSE POC: 191 MG/DL
GLUCOSE SERPL-MCNC: 200 MG/DL (ref 65–100)
HBA1C MFR BLD: 10.2 % (ref 4–5.6)
HDLC SERPL-MCNC: 42 MG/DL
HDLC SERPL: 3.5 {RATIO} (ref 0–5)
LDLC SERPL CALC-MCNC: 59.8 MG/DL (ref 0–100)
MICROALBUMIN UR-MCNC: 84.6 MG/DL
MICROALBUMIN/CREAT UR-RTO: 742 MG/G (ref 0–30)
POTASSIUM SERPL-SCNC: 5.6 MMOL/L (ref 3.5–5.1)
PROT SERPL-MCNC: 7.7 G/DL (ref 6.4–8.2)
SODIUM SERPL-SCNC: 138 MMOL/L (ref 136–145)
TRIGL SERPL-MCNC: 231 MG/DL (ref ?–150)
VLDLC SERPL CALC-MCNC: 46.2 MG/DL

## 2021-06-01 PROCEDURE — 99213 OFFICE O/P EST LOW 20 MIN: CPT | Performed by: FAMILY MEDICINE

## 2021-06-01 PROCEDURE — G8754 DIAS BP LESS 90: HCPCS | Performed by: FAMILY MEDICINE

## 2021-06-01 PROCEDURE — 1101F PT FALLS ASSESS-DOCD LE1/YR: CPT | Performed by: FAMILY MEDICINE

## 2021-06-01 PROCEDURE — G8510 SCR DEP NEG, NO PLAN REQD: HCPCS | Performed by: FAMILY MEDICINE

## 2021-06-01 PROCEDURE — G8752 SYS BP LESS 140: HCPCS | Performed by: FAMILY MEDICINE

## 2021-06-01 PROCEDURE — G0463 HOSPITAL OUTPT CLINIC VISIT: HCPCS | Performed by: FAMILY MEDICINE

## 2021-06-01 PROCEDURE — G8536 NO DOC ELDER MAL SCRN: HCPCS | Performed by: FAMILY MEDICINE

## 2021-06-01 PROCEDURE — G8417 CALC BMI ABV UP PARAM F/U: HCPCS | Performed by: FAMILY MEDICINE

## 2021-06-01 PROCEDURE — G8427 DOCREV CUR MEDS BY ELIG CLIN: HCPCS | Performed by: FAMILY MEDICINE

## 2021-06-01 PROCEDURE — 82947 ASSAY GLUCOSE BLOOD QUANT: CPT | Performed by: FAMILY MEDICINE

## 2021-06-01 RX ORDER — QUINAPRIL 10 MG/1
TABLET ORAL
Qty: 90 TABLET | Refills: 3 | Status: SHIPPED | OUTPATIENT
Start: 2021-06-01 | End: 2022-06-14 | Stop reason: SDUPTHER

## 2021-06-01 RX ORDER — GLIPIZIDE 10 MG/1
TABLET ORAL
Qty: 180 TABLET | Refills: 3 | Status: SHIPPED | OUTPATIENT
Start: 2021-06-01 | End: 2022-06-14 | Stop reason: SDUPTHER

## 2021-06-01 RX ORDER — METFORMIN HYDROCHLORIDE 850 MG/1
850 TABLET ORAL 3 TIMES DAILY
Qty: 270 TABLET | Refills: 3 | Status: SHIPPED | OUTPATIENT
Start: 2021-06-01

## 2021-06-01 NOTE — PROGRESS NOTES
HISTORY OF PRESENT ILLNESS  Allie Zapata is a 80 y.o. male. f/u hbp,dm,chol,veronica. .BS have been running higher and  exercise tolerance decresed,minor neuropathy c/o. Recently seen by cardiology  Diabetes  The history is provided by the patient. This is a chronic problem. The problem occurs daily. The problem has been gradually improving. Pertinent negatives include no abdominal pain, no headaches and no shortness of breath. Hypertension   The history is provided by the patient. This is a chronic problem. The problem has been gradually worsening. Pertinent negatives include no orthopnea, no palpitations, no malaise/fatigue, no blurred vision, no headaches, no peripheral edema, no shortness of breath, no nausea and no vomiting. Cholesterol Problem  The history is provided by the patient. This is a chronic problem. The problem occurs daily. The problem has been gradually improving. Pertinent negatives include no abdominal pain, no headaches and no shortness of breath. Shortness of Breath  The history is provided by the patient. This is a chronic problem. The problem has not changed since onset. Pertinent negatives include no fever, no headaches, no cough, no sputum production, no orthopnea, no vomiting, no abdominal pain and no rash. Review of Systems   Constitutional: Negative for fever and malaise/fatigue. HENT: Negative for hearing loss. Eyes: Negative for blurred vision and double vision. Respiratory: Negative for cough, sputum production and shortness of breath. Cardiovascular: Negative for palpitations and orthopnea. Gastrointestinal: Negative for abdominal pain, nausea and vomiting. Genitourinary: Negative for dysuria and frequency. Musculoskeletal: Negative for joint pain and myalgias. Skin: Negative for rash. Neurological: Positive for tingling. Negative for headaches. Psychiatric/Behavioral: Negative for depression. The patient is not nervous/anxious.         Physical Exam  Constitutional:       Appearance: Normal appearance. He is well-developed and normal weight. HENT:      Head: Normocephalic and atraumatic. Right Ear: External ear normal.      Left Ear: External ear normal.      Nose: Nose normal.   Eyes:      Pupils: Pupils are equal, round, and reactive to light. Cardiovascular:      Rate and Rhythm: Normal rate and regular rhythm. Pulses: Normal pulses. Heart sounds: Normal heart sounds. Pulmonary:      Effort: Pulmonary effort is normal.      Breath sounds: Normal breath sounds. Abdominal:      General: Bowel sounds are normal.      Palpations: Abdomen is soft. Musculoskeletal:         General: Normal range of motion. Right shoulder: No tenderness or bony tenderness. Decreased strength. Cervical back: Normal range of motion and neck supple. Skin:     General: Skin is warm and dry. Findings: No erythema or rash. Comments: Comprehensive Diabetic Foot Exam  was performed     Neurological:      General: No focal deficit present. Mental Status: He is alert and oriented to person, place, and time. Psychiatric:         Mood and Affect: Mood normal.         ASSESSMENT and PLAN  Diagnoses and all orders for this visit:    1. Type 2 diabetes with nephropathy (HCC),fair control  -     MICROALBUMIN, UR, RAND W/ MICROALB/CREAT RATIO; Future  -     AMB POC GLUCOSE, QUANTITATIVE, BLOOD  -     HEMOGLOBIN A1C WITH EAG; Future    2. Coronary artery disease due to lipid rich plaque,stable    3. S/P drug eluting coronary stent placement    4. Essential hypertension, benign  -     METABOLIC PANEL, COMPREHENSIVE; Future  -     quinapriL (ACCUPRIL) 10 mg tablet; TAKE 1 TABLET DAILY    5. Mixed hyperlipidemia  -     LIPID PANEL; Future    6. GEORGES (obstructive sleep apnea)    7. Other iron deficiency anemia    8.  Controlled type 2 diabetes mellitus with diabetic polyneuropathy, without long-term current use of insulin (HCC)  -     glipiZIDE (GLUCOTROL) 10 mg tablet; TAKE 1 TABLET TWICE A DAY    Other orders  -     metFORMIN (GLUCOPHAGE) 850 mg tablet; Take 1 Tablet by mouth three (3) times daily.     Doing well,continue current meds and treatments

## 2021-06-01 NOTE — PROGRESS NOTES
Chief Complaint   Patient presents with    Diabetes     F/U on diabetes.  Hypertension     F/U on BP.  Cholesterol Problem     F/U on cholesterol. 1. Have you been to the ER, urgent care clinic since your last visit? Hospitalized since your last visit? No    2. Have you seen or consulted any other health care providers outside of the [address] [place] [address] since your last visit? Include any pap smears or colon screening.  No

## 2021-06-14 RX ORDER — ATORVASTATIN CALCIUM 40 MG/1
TABLET, FILM COATED ORAL
Qty: 90 TABLET | Refills: 3 | Status: SHIPPED | OUTPATIENT
Start: 2021-06-14 | End: 2022-10-07

## 2021-06-14 NOTE — TELEPHONE ENCOUNTER
Per Dr. Kimberley Pallas VO:  XHX:3/34/0931  Future Appointments   Date Time Provider Mal Ginny   9/1/2021  8:40 AM Josh Rosen MD [facility] BS AMB   11/3/2021 11:40 AM MD FIDEL Rodriguez BS AMB     Requested Prescriptions     Pending Prescriptions Disp Refills    atorvastatin (LIPITOR) 40 mg tablet [Pharmacy Med Name: ATORVASTATIN 40 MG TABLET] 90 Tablet 0     Sig: TAKE 1 TABLET BY MOUTH EVERY DAY AT NIGHT

## 2021-07-27 RX ORDER — CLOPIDOGREL BISULFATE 75 MG/1
TABLET ORAL
Qty: 90 TABLET | Refills: 1 | Status: SHIPPED | OUTPATIENT
Start: 2021-07-27 | End: 2022-01-18

## 2021-09-01 ENCOUNTER — OFFICE VISIT (OUTPATIENT)
Dept: FAMILY MEDICINE CLINIC | Age: 81
End: 2021-09-01
Payer: MEDICARE

## 2021-09-01 VITALS
RESPIRATION RATE: 18 BRPM | TEMPERATURE: 97.8 F | BODY MASS INDEX: 31.7 KG/M2 | HEART RATE: 56 BPM | SYSTOLIC BLOOD PRESSURE: 122 MMHG | WEIGHT: 214 LBS | OXYGEN SATURATION: 94 % | HEIGHT: 69 IN | DIASTOLIC BLOOD PRESSURE: 58 MMHG

## 2021-09-01 DIAGNOSIS — E11.21 TYPE 2 DIABETES WITH NEPHROPATHY (HCC): Primary | ICD-10-CM

## 2021-09-01 DIAGNOSIS — R05.9 COUGH IN ADULT: ICD-10-CM

## 2021-09-01 DIAGNOSIS — G62.9 POLYNEUROPATHY: ICD-10-CM

## 2021-09-01 DIAGNOSIS — I10 ESSENTIAL HYPERTENSION, BENIGN: ICD-10-CM

## 2021-09-01 DIAGNOSIS — I25.10 CORONARY ARTERY DISEASE DUE TO LIPID RICH PLAQUE: ICD-10-CM

## 2021-09-01 DIAGNOSIS — E78.2 MIXED HYPERLIPIDEMIA: ICD-10-CM

## 2021-09-01 DIAGNOSIS — G47.33 OSA (OBSTRUCTIVE SLEEP APNEA): ICD-10-CM

## 2021-09-01 DIAGNOSIS — I25.83 CORONARY ARTERY DISEASE DUE TO LIPID RICH PLAQUE: ICD-10-CM

## 2021-09-01 LAB
ALBUMIN SERPL-MCNC: 4 G/DL (ref 3.5–5)
ALBUMIN/GLOB SERPL: 1.1 {RATIO} (ref 1.1–2.2)
ALP SERPL-CCNC: 74 U/L (ref 45–117)
ALT SERPL-CCNC: 24 U/L (ref 12–78)
ANION GAP SERPL CALC-SCNC: 6 MMOL/L (ref 5–15)
AST SERPL-CCNC: 14 U/L (ref 15–37)
BILIRUB SERPL-MCNC: 0.6 MG/DL (ref 0.2–1)
BUN SERPL-MCNC: 24 MG/DL (ref 6–20)
BUN/CREAT SERPL: 24 (ref 12–20)
CALCIUM SERPL-MCNC: 9.4 MG/DL (ref 8.5–10.1)
CHLORIDE SERPL-SCNC: 106 MMOL/L (ref 97–108)
CHOLEST SERPL-MCNC: 107 MG/DL
CO2 SERPL-SCNC: 25 MMOL/L (ref 21–32)
COMMENT, HOLDF: NORMAL
CREAT SERPL-MCNC: 0.98 MG/DL (ref 0.7–1.3)
GLOBULIN SER CALC-MCNC: 3.5 G/DL (ref 2–4)
GLUCOSE SERPL-MCNC: 123 MG/DL (ref 65–100)
HBA1C MFR BLD HPLC: 7.4 %
POTASSIUM SERPL-SCNC: 4.9 MMOL/L (ref 3.5–5.1)
PROT SERPL-MCNC: 7.5 G/DL (ref 6.4–8.2)
SAMPLES BEING HELD,HOLD: NORMAL
SODIUM SERPL-SCNC: 137 MMOL/L (ref 136–145)

## 2021-09-01 PROCEDURE — 99214 OFFICE O/P EST MOD 30 MIN: CPT | Performed by: FAMILY MEDICINE

## 2021-09-01 PROCEDURE — G0463 HOSPITAL OUTPT CLINIC VISIT: HCPCS | Performed by: FAMILY MEDICINE

## 2021-09-01 PROCEDURE — 83036 HEMOGLOBIN GLYCOSYLATED A1C: CPT | Performed by: FAMILY MEDICINE

## 2021-09-01 PROCEDURE — G8752 SYS BP LESS 140: HCPCS | Performed by: FAMILY MEDICINE

## 2021-09-01 PROCEDURE — G8536 NO DOC ELDER MAL SCRN: HCPCS | Performed by: FAMILY MEDICINE

## 2021-09-01 PROCEDURE — 1101F PT FALLS ASSESS-DOCD LE1/YR: CPT | Performed by: FAMILY MEDICINE

## 2021-09-01 PROCEDURE — G8432 DEP SCR NOT DOC, RNG: HCPCS | Performed by: FAMILY MEDICINE

## 2021-09-01 PROCEDURE — G8754 DIAS BP LESS 90: HCPCS | Performed by: FAMILY MEDICINE

## 2021-09-01 PROCEDURE — G8417 CALC BMI ABV UP PARAM F/U: HCPCS | Performed by: FAMILY MEDICINE

## 2021-09-01 PROCEDURE — 3051F HG A1C>EQUAL 7.0%<8.0%: CPT | Performed by: FAMILY MEDICINE

## 2021-09-01 PROCEDURE — G8427 DOCREV CUR MEDS BY ELIG CLIN: HCPCS | Performed by: FAMILY MEDICINE

## 2021-09-01 RX ORDER — GABAPENTIN 100 MG/1
100 CAPSULE ORAL
Qty: 30 CAPSULE | Refills: 5 | Status: SHIPPED | OUTPATIENT
Start: 2021-09-01 | End: 2021-09-01 | Stop reason: SDUPTHER

## 2021-09-01 RX ORDER — GABAPENTIN 100 MG/1
100 CAPSULE ORAL
Qty: 30 CAPSULE | Refills: 5 | Status: SHIPPED | OUTPATIENT
Start: 2021-09-01 | End: 2022-02-14 | Stop reason: ALTCHOICE

## 2021-09-01 NOTE — PROGRESS NOTES
HISTORY OF PRESENT ILLNESS  Hiren Robert is a 80 y.o. male. f/u hbp,dm,chol,veronica. .BS have been improved,minor neuropathy c/o in both feet. Having some chronic cough  Diabetes  The history is provided by the patient. This is a chronic problem. The problem occurs daily. The problem has been gradually improving. Pertinent negatives include no headaches and no shortness of breath. Hypertension   The history is provided by the patient. This is a chronic problem. The problem has been gradually worsening. Pertinent negatives include no orthopnea, no palpitations, no malaise/fatigue, no blurred vision, no headaches, no peripheral edema and no shortness of breath. Cholesterol Problem  The history is provided by the patient. This is a chronic problem. The problem occurs daily. The problem has been gradually improving. Pertinent negatives include no headaches and no shortness of breath. Shortness of Breath  The history is provided by the patient. This is a chronic problem. The problem has not changed since onset. Associated symptoms include cough. Pertinent negatives include no fever, no headaches, no sputum production, no orthopnea and no rash. Foot Pain  The history is provided by the patient. This is a chronic problem. The problem occurs daily. Pertinent negatives include no headaches and no shortness of breath. Review of Systems   Constitutional: Negative for fever and malaise/fatigue. HENT: Negative for hearing loss. Eyes: Negative for blurred vision and double vision. Respiratory: Positive for cough and stridor. Negative for sputum production and shortness of breath. Cardiovascular: Negative for palpitations and orthopnea. Gastrointestinal: Negative for heartburn. Genitourinary: Negative for dysuria. Musculoskeletal: Negative for joint pain and myalgias. Skin: Negative for rash. Neurological: Positive for tingling. Negative for headaches. Psychiatric/Behavioral: Negative for depression. The patient is not nervous/anxious. Physical Exam  Constitutional:       Appearance: Normal appearance. He is well-developed and normal weight. HENT:      Head: Normocephalic and atraumatic. Right Ear: External ear normal.      Left Ear: External ear normal.      Nose: Nose normal.   Eyes:      Pupils: Pupils are equal, round, and reactive to light. Cardiovascular:      Rate and Rhythm: Normal rate and regular rhythm. Pulses: Normal pulses. Heart sounds: Normal heart sounds. Pulmonary:      Effort: Pulmonary effort is normal.      Breath sounds: Normal breath sounds. Abdominal:      General: Bowel sounds are normal.      Palpations: Abdomen is soft. Musculoskeletal:         General: Normal range of motion. Right shoulder: No tenderness or bony tenderness. Decreased strength. Cervical back: Normal range of motion and neck supple. Skin:     General: Skin is warm and dry. Findings: No erythema or rash. Comments: Comprehensive Diabetic Foot Exam  was performed     Neurological:      General: No focal deficit present. Mental Status: He is alert and oriented to person, place, and time. Psychiatric:         Mood and Affect: Mood normal.         Diagnoses and all orders for this visit:    1. Type 2 diabetes with nephropathy (HCC)  -     AMB POC HEMOGLOBIN A1C    2. Coronary artery disease due to lipid rich plaque    3. Essential hypertension, benign  -     METABOLIC PANEL, COMPREHENSIVE; Future    4. Mixed hyperlipidemia  -     CHOLESTEROL, TOTAL; Future    5. GEORGES (obstructive sleep apnea)    6. Polyneuropathy  -     gabapentin (NEURONTIN) 100 mg capsule; Take 1 Capsule by mouth nightly. Max Daily Amount: 100 mg.    7. Cough in adult  -     XR CHEST PA LAT; Future      Follow-up and Dispositions    · Return in about 4 months (around 1/1/2022).        .    Doing well,continue current meds and treatments

## 2021-09-01 NOTE — PROGRESS NOTES
Chief Complaint   Patient presents with    Diabetes     F/U on diabetes.  Hypertension     F/U on BP.  Cholesterol Problem     F/U on cholesterol.  Foot Pain     Pt having triny foot pain.  Urinary Frequency     Pt state he has urinary frequency and urgency. 1. Have you been to the ER, urgent care clinic since your last visit? Hospitalized since your last visit? No    2. Have you seen or consulted any other health care providers outside of the [address] [place] [address] since your last visit? Include any pap smears or colon screening.  No

## 2021-09-10 NOTE — PERIOP NOTES
[facility]  PREOPERATIVE INSTRUCTIONS    Surgery Date:   Monday 11/20/17      Surgery arrival time given by surgeon: NO  (If [facility] staff will call you between 3pm - 7pm the day before surgery with your arrival time. If your surgery is on a Monday, we will call you the preceding Friday. Please call [phone] after 7pm if you did not receive your arrival time.)  1. Report  to the 2nd Floor Admitting Desk on the day of your surgery. Bring your insurance card, photo identification, and any copayment (if applicable). 2. You must have a responsible adult to drive you home and stay with you the first 24 hours after surgery if you are going home the same day of your surgery. 3. Nothing to eat or drink after midnight the night before surgery. This means NO water, gum, mints, coffee, juice, etc.    4. MEDICATIONS TO TAKE THE MORNING OF SURGERY WITH A SIP OF WATER:amlodpine. HOLD glipizide and metformin on the day of surgery only. CONTACT Dr. Zbigniew Mcclain about when to stop aspirin (pt reports that he has spoken with Dr. Zbigniew Mcclain about this already and he is to stop 5 days prior to procedure). .  5. No alcoholic beverages 24 hours before and after your surgery. 6. If you are being admitted to the hospital,please leave personal belongings/luggage in your car until you have an assigned hospital room number. ( The hospital discharge time is 12 PM NOON. Your adult  should be at the hospital prior to the noon discharge time unless otherwise instructed.)   7. STOP Aspirin and/or any non-steroidal anti-inflammatory drugs (i.e. Ibuprofen, Naproxen, Advil, Aleve) as directed by your surgeon. You may take Tylenol. Stop herbal supplements 1 week prior to  surgery. 8. If you are currently taking Plavix, Coumadin,or any other blood-thinning/ anticoagulant medication contact your surgeon for instructions. 9. Wear comfortable clothes. Wear your glasses instead of contacts. Please leave all money, jewelry and valuables at home.  No make up, Last office visit: 06/01/2020  Last refill: 06/01/2020 60mL with 2 refills   particularly eladia, the day of surgery. 10.  REMOVE ALL body piercings, rings,and jewelry and leave at home. Wear your hair loose or down, no pony-tails, buns, or any metal hair clips. 11. If you shower the morning of surgery, please do not apply any lotions, powders, or deodorants afterwards. Do not shave any body area within 24 hours of your surgery. 12. Please follow all instructions to avoid any potential surgical cancellation. 13. Should your physical condition change, (i.e. fever, cold, flu, etc.) please notify your surgeon as soon as possible. 14. It is important to be on time. If a situation occurs where you may be delayed, please call:  [phone] / [phone] on the day of surgery. 15. The Preadmission Testing staff can be reached at [phone]. 16.  Special Instructions:  · Use Chlorhexidine Care Fusion wash and sponges 3 days prior to surgery as instructed. · Incentive spirometer given with instructions to practice at home and bring back to the hospital on the day of surgery. · [facility] will contact you if your Hemoglobin A1C is greater than 7.5. · Ensure/Glucerna  sample, nutritional information, and Ensure/Glucerna coupon given. · Pain pamphlet and Call Don't Fall reminder reviewed with patient. ·  parking is complimentary Monday - Friday 7 am - 5 pm  · Bring PTA Medication list day of surgery with the last doses taken documented   · Do not bring medication bottles the day of surgery  · High protein snack before midnight  16. The patient was contacted  in person. He  verbalize  understanding of all instructions does not  need reinforcement.

## 2021-09-15 DIAGNOSIS — I10 ESSENTIAL HYPERTENSION, BENIGN: ICD-10-CM

## 2021-09-17 RX ORDER — AMLODIPINE BESYLATE 10 MG/1
TABLET ORAL
Qty: 90 TABLET | Refills: 4 | Status: SHIPPED | OUTPATIENT
Start: 2021-09-17

## 2021-09-17 NOTE — TELEPHONE ENCOUNTER
Refill per VO of Dr. Teresa Bhatia  Last appt: 7/9/2020  Future Appointments   Date Time Provider Mal Ginny   11/3/2021 11:40 AM MD FIDEL Forbes BS AMB   1/5/2022  8:40 AM Lon Mast MD [facility] BS AMB       Requested Prescriptions     Pending Prescriptions Disp Refills    amLODIPine (NORVASC) 10 mg tablet [Pharmacy Med Name: AMLODIPINE BESYLATE 10 MG TAB] 90 Tablet 4     Sig: TAKE 1 TABLET BY MOUTH EVERY DAY

## 2021-10-04 ENCOUNTER — HOSPITAL ENCOUNTER (EMERGENCY)
Age: 81
Discharge: HOME | End: 2021-10-04
Attending: STUDENT IN AN ORGANIZED HEALTH CARE EDUCATION/TRAINING PROGRAM
Payer: MEDICARE

## 2021-10-04 VITALS
TEMPERATURE: 98.5 F | BODY MASS INDEX: 31.1 KG/M2 | RESPIRATION RATE: 20 BRPM | DIASTOLIC BLOOD PRESSURE: 97 MMHG | HEIGHT: 69 IN | OXYGEN SATURATION: 95 % | HEART RATE: 60 BPM | WEIGHT: 210 LBS | SYSTOLIC BLOOD PRESSURE: 147 MMHG

## 2021-10-04 DIAGNOSIS — R06.02 SOB (SHORTNESS OF BREATH): Primary | ICD-10-CM

## 2021-10-04 DIAGNOSIS — R09.02 HYPOXIA: ICD-10-CM

## 2021-10-04 LAB
ALBUMIN SERPL-MCNC: 3.6 G/DL (ref 3.5–5)
ALBUMIN/GLOB SERPL: 0.9 {RATIO} (ref 1.1–2.2)
ALP SERPL-CCNC: 70 U/L (ref 45–117)
ALT SERPL-CCNC: 18 U/L (ref 12–78)
ANION GAP SERPL CALC-SCNC: 9 MMOL/L (ref 5–15)
AST SERPL-CCNC: 15 U/L (ref 15–37)
ATRIAL RATE: 250 BPM
BASOPHILS # BLD: 0.1 K/UL (ref 0–0.1)
BASOPHILS NFR BLD: 1 % (ref 0–1)
BILIRUB SERPL-MCNC: 0.5 MG/DL (ref 0.2–1)
BNP SERPL-MCNC: 910 PG/ML
BUN SERPL-MCNC: 24 MG/DL (ref 6–20)
BUN/CREAT SERPL: 22 (ref 12–20)
CALCIUM SERPL-MCNC: 9 MG/DL (ref 8.5–10.1)
CALCULATED P AXIS, ECG09: 62 DEGREES
CALCULATED R AXIS, ECG10: 92 DEGREES
CALCULATED T AXIS, ECG11: 3 DEGREES
CHLORIDE SERPL-SCNC: 107 MMOL/L (ref 97–108)
CO2 SERPL-SCNC: 23 MMOL/L (ref 21–32)
COMMENT, HOLDF: NORMAL
CREAT SERPL-MCNC: 1.09 MG/DL (ref 0.7–1.3)
DIAGNOSIS, 93000: NORMAL
DIFFERENTIAL METHOD BLD: ABNORMAL
EOSINOPHIL # BLD: 0.5 K/UL (ref 0–0.4)
EOSINOPHIL NFR BLD: 4 % (ref 0–7)
ERYTHROCYTE [DISTWIDTH] IN BLOOD BY AUTOMATED COUNT: 15.1 % (ref 11.5–14.5)
GLOBULIN SER CALC-MCNC: 3.9 G/DL (ref 2–4)
GLUCOSE SERPL-MCNC: 149 MG/DL (ref 65–100)
HCT VFR BLD AUTO: 35.9 % (ref 36.6–50.3)
HGB BLD-MCNC: 11 G/DL (ref 12.1–17)
IMM GRANULOCYTES # BLD AUTO: 0 K/UL (ref 0–0.04)
IMM GRANULOCYTES NFR BLD AUTO: 0 % (ref 0–0.5)
LYMPHOCYTES # BLD: 1 K/UL (ref 0.8–3.5)
LYMPHOCYTES NFR BLD: 9 % (ref 12–49)
MCH RBC QN AUTO: 26.7 PG (ref 26–34)
MCHC RBC AUTO-ENTMCNC: 30.6 G/DL (ref 30–36.5)
MCV RBC AUTO: 87.1 FL (ref 80–99)
MONOCYTES # BLD: 0.8 K/UL (ref 0–1)
MONOCYTES NFR BLD: 8 % (ref 5–13)
NEUTS SEG # BLD: 8.5 K/UL (ref 1.8–8)
NEUTS SEG NFR BLD: 78 % (ref 32–75)
NRBC # BLD: 0 K/UL (ref 0–0.01)
NRBC BLD-RTO: 0 PER 100 WBC
PLATELET # BLD AUTO: 198 K/UL (ref 150–400)
PMV BLD AUTO: 11.8 FL (ref 8.9–12.9)
POTASSIUM SERPL-SCNC: 4.1 MMOL/L (ref 3.5–5.1)
PROT SERPL-MCNC: 7.5 G/DL (ref 6.4–8.2)
Q-T INTERVAL, ECG07: 474 MS
QRS DURATION, ECG06: 138 MS
QTC CALCULATION (BEZET), ECG08: 461 MS
RBC # BLD AUTO: 4.12 M/UL (ref 4.1–5.7)
SAMPLES BEING HELD,HOLD: NORMAL
SODIUM SERPL-SCNC: 139 MMOL/L (ref 136–145)
TROPONIN I SERPL-MCNC: <0.05 NG/ML
VENTRICULAR RATE, ECG03: 57 BPM
WBC # BLD AUTO: 10.9 K/UL (ref 4.1–11.1)

## 2021-10-04 PROCEDURE — 93005 ELECTROCARDIOGRAM TRACING: CPT

## 2021-10-04 PROCEDURE — 84484 ASSAY OF TROPONIN QUANT: CPT

## 2021-10-04 PROCEDURE — 94618 PULMONARY STRESS TESTING: CPT

## 2021-10-04 PROCEDURE — 83880 ASSAY OF NATRIURETIC PEPTIDE: CPT

## 2021-10-04 PROCEDURE — 80053 COMPREHEN METABOLIC PANEL: CPT

## 2021-10-04 PROCEDURE — 77010033678 HC OXYGEN DAILY

## 2021-10-04 PROCEDURE — 85025 COMPLETE CBC W/AUTO DIFF WBC: CPT

## 2021-10-04 PROCEDURE — 36415 COLL VENOUS BLD VENIPUNCTURE: CPT

## 2021-10-04 PROCEDURE — 94760 N-INVAS EAR/PLS OXIMETRY 1: CPT

## 2021-10-04 PROCEDURE — 99285 EMERGENCY DEPT VISIT HI MDM: CPT

## 2021-10-04 NOTE — ED TRIAGE NOTES
Pt arrives via EMS for c/o acute onset SOB upon waking this am. Pt states he was wearing 2 L NC at night for sleep apnea however he returned his O2 yesterday because they want him to start CPAP. Hs of CHF.  Reports he has had a cough, chest congestion, and BLE edema

## 2021-10-04 NOTE — ED PROVIDER NOTES
Patient is an 80-year-old male present emergency department shortness of breath. Patient states that he is currently undergoing fittings for a CPAP machine for sleep apnea and feels like the current mask that he has does not fit properly up until then he was wearing 2 L nasal cannula at night. Recently he stopped with the 2 L nasal cannula last night at about 4 AM he felt like he cannot catch his breath. Patient denies any chest pain he sees Dr. Nicolette Figueroa his cardiologist stating he has a diagnosis of congestive heart failure but is been well controlled with medications with diuretic meds. Patient denies any dizziness lightheadedness recent illnesses.            Past Medical History:   Diagnosis Date    Arthritis     CAD (coronary artery disease)     [facility] ([facility] [facility] 1997    Diabetes ([facility]     Diverticular disease of colon 4/5/2010    DM (diabetes mellitus) type II controlled, neurological manifestation ([facility] [address] 12/7/2014    Encounter for long-term (current) use of other medications     Hypertension     Mixed hyperlipidemia 04/05/2010    pt reports medication was started due to history of DM    Obesity     GEORGES (obstructive sleep apnea) 4/5/2010    Unspecified sleep apnea     O2 AT NIGHT 2L, CANNOT USE CPAP MASK       Past Surgical History:   Procedure Laterality Date    HX KNEE ARTHROSCOPY Left 1971    HX KNEE ARTHROSCOPY Right 1991    HX LUMBAR LAMINECTOMY  2012    HX OTHER SURGICAL  1971    CIRCUMCISION, VASECTOMY    HX TOTAL COLECTOMY  6/1997    RESECTION    HX WISDOM TEETH EXTRACTION  1980         Family History:   Problem Relation Age of Onset    Stroke Mother     Heart Disease Father     Diabetes Father     Stroke Brother     Heart Disease Brother     Other Brother         AAA    Heart Disease Brother     Other Brother         AAA    Other Brother         anuerysms in legs and AAA, prediabetic    No Known Problems Daughter     No Known Problems Sister     Heart Disease Brother     Other Brother         AAA    Other Brother         Lyme disease?, prediabetic       Social History     Socioeconomic History    Marital status:      Spouse name: Not on file    Number of children: Not on file    Years of education: Not on file    Highest education level: Not on file   Occupational History    Not on file   Tobacco Use    Smoking status: Former Smoker     Packs/day: 1.50     Years: 25.00     Pack years: 37.50     Quit date: 1982     Years since quittin.7    Smokeless tobacco: Former User     Quit date: 2005   Vaping Use    Vaping Use: Never used   Substance and Sexual Activity    Alcohol use: Yes     Alcohol/week: 7.0 standard drinks     Types: 7 Glasses of wine per week    Drug use: No    Sexual activity: Yes     Partners: Female     Birth control/protection: None   Other Topics Concern    Not on file   Social History Narrative    Not on file     Social Determinants of Health     Financial Resource Strain:     Difficulty of Paying Living Expenses:    Food Insecurity:     Worried About Running Out of Food in the Last Year:     [address] in the Last Year:    Transportation Needs:     Lack of Transportation (Medical):  Lack of Transportation (Non-Medical):    Physical Activity:     Days of Exercise per Week:     Minutes of Exercise per Session:    Stress:     Feeling of Stress :    Social Connections:     Frequency of Communication with Friends and Family:     Frequency of Social Gatherings with Friends and Family:     Attends Yazdanism Services:     Active Member of Clubs or Organizations:     Attends Club or Organization Meetings:     Marital Status:    Intimate Partner Violence:     Fear of Current or Ex-Partner:     Emotionally Abused:     Physically Abused:     Sexually Abused: ALLERGIES: Lortab [hydrocodone-acetaminophen]    Review of Systems   Constitutional: Negative for activity change and appetite change.    Respiratory: Positive for shortness of breath. Negative for cough and chest tightness. Cardiovascular: Negative for chest pain. All other systems reviewed and are negative. Vitals:    10/04/21 0525 10/04/21 0530 10/04/21 0532   BP: (!) 141/52 (!) 137/44    Pulse: (!) 57 (!) 57    Resp: 16 22    Temp: 98.5 °F (36.9 °C)     SpO2: 94% 94% 94%   Weight: 95.3 kg (210 lb)     Height: 5' 9\" (1.753 m)              Physical Exam  Vitals and nursing note reviewed. Constitutional:       Appearance: He is well-developed. HENT:      Head: Normocephalic and atraumatic. Eyes:      Extraocular Movements: Extraocular movements intact. Pupils: Pupils are equal, round, and reactive to light. Cardiovascular:      Rate and Rhythm: Normal rate and regular rhythm. Pulses: Normal pulses. Heart sounds: Normal heart sounds. Pulmonary:      Effort: Pulmonary effort is normal. No tachypnea, accessory muscle usage or respiratory distress. Breath sounds: Normal breath sounds. Abdominal:      General: Bowel sounds are normal.      Palpations: Abdomen is soft. Musculoskeletal:         General: Normal range of motion. Right lower le+ Edema present. Left lower le+ Edema present. Neurological:      General: No focal deficit present. Mental Status: He is alert and oriented to person, place, and time. Psychiatric:         Mood and Affect: Mood normal.         Behavior: Behavior normal.          MDM  Number of Diagnoses or Management Options  SOB (shortness of breath)  Diagnosis management comments: A/P: CHF exacerbation, orthopnea. 70-year-old male presenting with orthopnea patient recently been taken off of his nasal cannula 2 L at home for transition to CPAP for sleep apnea likely patient's orthopnea secondary to this transition and mask not fitting properly. Will rule out CHF with EKG, labs including cardiac enzymes.          Procedures    6:47 AM  Labs reassuring patient to be discharged will follow up with CPAP company to have mask fitted properly.

## 2021-10-04 NOTE — PROGRESS NOTES
CARE MANAGEMENT NOTE      CM notified by MD that Pt would need home O2. CM dicussed process for testing Pt for home O2. CM met with Pt. Pt states that he previously had home O2 from [facility] and he would like to use them again. RT evaluation completed and reviewed. Orders were received. CM sent referral to [facility] via [employer]. CM spoke with Natasha at [facility] to notify of referral and that Pt was in ER. CM asked that information be processed and portable tank provided ASAP. Natasha voiced understanding. This phone call took place around 9:30am. CM met with Pt to update on status. CM called at 10:35am for an update. Per Natasha they were still processing info. CM called back at 11:07am. CM spoke with Nohemy Gleason. Nohemy Gleason states they are still processing info and it could take up to 3 hours. CM will continue to follow. 12:35PM  CM called [facility] as Pt is still waiting on portable O2 delivery. Representative tried to contact dispatch without success. Representative states she will call CM back. CM waiting on return call.     2:23 PM  Michelle Rene has delivered portable tank.        _____________________________________  Chana Mcknight, BSW - Care Management  10/4/2021   11:13 AM

## 2021-10-04 NOTE — ED NOTES
Care assumed from Dr. Meghana Webster at 7 AM.  Please see his note for full H&P.  80-year-old male previously prescribed submental oxygen to wear at night then recently transitioned to CPAP machine but CPAP mask does not fit well. Was satting in mid [de-identified] on room air but does not have home O2. Case MGMT consulted to try to facilitate O2 at home versus getting a new CPAP mask for him. Case mgmt recommended RT eval to assess for home O2 needs. He was found to be hypoxic with ambulation and was recommended home O2, case management facilitated home O2 delivery he was discharged home recommended PCP follow-up for further evaluation. Return precautions were given for worsening or concerns.

## 2021-10-04 NOTE — PROGRESS NOTES
10/04/21 0910   Resting (Room Air)   SpO2 94   HR 55   During Walk (Room Air)   SpO2 86   HR 63   Comments placed on 2L NC   During Walk (On O2)   SpO2 92   HR 62   O2 Device Nasal cannula   O2 Flow Rate (l/min) 2 l/min   After Walk   SpO2 97   HR 60   O2 Device Nasal cannula   O2 Flow Rate (l/min) 2 l/min   Comments walked in hallway.  returned to bed on 2L NC   Does the Patient Qualify for Home O2 Yes   Does the Patient Need Portable Oxygen Tanks Yes

## 2021-10-11 ENCOUNTER — OFFICE VISIT (OUTPATIENT)
Dept: CARDIOLOGY CLINIC | Age: 81
End: 2021-10-11
Payer: MEDICARE

## 2021-10-11 VITALS
WEIGHT: 215 LBS | DIASTOLIC BLOOD PRESSURE: 64 MMHG | HEIGHT: 69 IN | SYSTOLIC BLOOD PRESSURE: 160 MMHG | BODY MASS INDEX: 31.84 KG/M2 | OXYGEN SATURATION: 93 % | HEART RATE: 56 BPM

## 2021-10-11 DIAGNOSIS — I25.10 CORONARY ARTERY DISEASE INVOLVING NATIVE HEART WITHOUT ANGINA PECTORIS, UNSPECIFIED VESSEL OR LESION TYPE: ICD-10-CM

## 2021-10-11 DIAGNOSIS — I50.32 CHRONIC HEART FAILURE WITH PRESERVED EJECTION FRACTION (HCC): ICD-10-CM

## 2021-10-11 DIAGNOSIS — R06.02 SOB (SHORTNESS OF BREATH): Primary | ICD-10-CM

## 2021-10-11 PROCEDURE — G0463 HOSPITAL OUTPT CLINIC VISIT: HCPCS | Performed by: SPECIALIST

## 2021-10-11 PROCEDURE — G8753 SYS BP > OR = 140: HCPCS | Performed by: SPECIALIST

## 2021-10-11 PROCEDURE — G8417 CALC BMI ABV UP PARAM F/U: HCPCS | Performed by: SPECIALIST

## 2021-10-11 PROCEDURE — G8432 DEP SCR NOT DOC, RNG: HCPCS | Performed by: SPECIALIST

## 2021-10-11 PROCEDURE — G8754 DIAS BP LESS 90: HCPCS | Performed by: SPECIALIST

## 2021-10-11 PROCEDURE — 1101F PT FALLS ASSESS-DOCD LE1/YR: CPT | Performed by: SPECIALIST

## 2021-10-11 PROCEDURE — G8536 NO DOC ELDER MAL SCRN: HCPCS | Performed by: SPECIALIST

## 2021-10-11 PROCEDURE — G8427 DOCREV CUR MEDS BY ELIG CLIN: HCPCS | Performed by: SPECIALIST

## 2021-10-11 PROCEDURE — 99214 OFFICE O/P EST MOD 30 MIN: CPT | Performed by: SPECIALIST

## 2021-10-11 RX ORDER — BUMETANIDE 1 MG/1
1 TABLET ORAL DAILY
Qty: 90 TABLET | Refills: 3 | Status: SHIPPED | OUTPATIENT
Start: 2021-10-11

## 2021-10-11 RX ORDER — SPIRONOLACTONE 25 MG/1
25 TABLET ORAL DAILY
Qty: 90 TABLET | Refills: 3 | Status: SHIPPED | OUTPATIENT
Start: 2021-10-11 | End: 2022-08-30

## 2021-10-11 NOTE — PROGRESS NOTES
Mary Hernandez is a 80 y.o. male    Visit Vitals  BP (!) 160/64 (BP 1 Location: Left upper arm, BP Patient Position: Sitting, BP Cuff Size: Adult)   Pulse (!) 56   Ht 5' 9\" (1.753 m)   Wt 215 lb (97.5 kg)   SpO2 93%   BMI 31.75 kg/m²       Chief Complaint   Patient presents with    CHF    Coronary Artery Disease    Cholesterol Problem    Hypertension       Chest pain NO  SOB YES  Dizziness NO  Swelling FEET  Recent hospital visit October 2021 JEN [facility]  Refills NO  COVID VACCINE STATUS YES

## 2021-10-11 NOTE — PROGRESS NOTES
Orders for Check an echo soon. See me in one month    per Dr. Sweta Guillory VO.   Dx: Reese Alatorre

## 2021-10-11 NOTE — PROGRESS NOTES
Nnamdi Madden MD. [facility] [place]              Patient: Prashant Kapadia  : 1940      Today's Date: 10/11/2021            HISTORY OF PRESENT ILLNESS:     History of Present Illness:  Here for FU. Was in  ED 10/4/21 with SOB. proBNP minimally elevated. Home O2 recommended for hypoxia. Still has SOB - class 3 ANGELES. PAST MEDICAL HISTORY:     Past Medical History:   Diagnosis Date    Arthritis     CAD (coronary artery disease)     [facility] [address]     Diabetes ([facility] [address] [facility]     Diverticular disease of colon 2010    DM (diabetes mellitus) type II controlled, neurological manifestation ([address] 2014    Encounter for long-term (current) use of other medications     Hypertension     Mixed hyperlipidemia 2010    pt reports medication was started due to history of DM    Obesity     GEORGES (obstructive sleep apnea) 2010    Unspecified sleep apnea     O2 AT NIGHT 2L, CANNOT USE CPAP MASK         Past Surgical History:   Procedure Laterality Date    HX KNEE ARTHROSCOPY Left     HX KNEE ARTHROSCOPY Right     HX LUMBAR LAMINECTOMY      HX OTHER SURGICAL      CIRCUMCISION, VASECTOMY    HX TOTAL COLECTOMY  1997    RESECTION    HX WISDOM TEETH EXTRACTION             MEDICATIONS:     Current Outpatient Medications   Medication Sig Dispense Refill    amLODIPine (NORVASC) 10 mg tablet TAKE 1 TABLET BY MOUTH EVERY DAY 90 Tablet 4    gabapentin (NEURONTIN) 100 mg capsule Take 1 Capsule by mouth nightly. Max Daily Amount: 100 mg. 30 Capsule 5    clopidogreL (PLAVIX) 75 mg tab TAKE 1 TABLET BY MOUTH EVERY DAY 90 Tablet 1    atorvastatin (LIPITOR) 40 mg tablet TAKE 1 TABLET BY MOUTH EVERY DAY AT NIGHT 90 Tablet 3    quinapriL (ACCUPRIL) 10 mg tablet TAKE 1 TABLET DAILY 90 Tablet 3    metFORMIN (GLUCOPHAGE) 850 mg tablet Take 1 Tablet by mouth three (3) times daily.  270 Tablet 3    glipiZIDE (GLUCOTROL) 10 mg tablet TAKE 1 TABLET TWICE A  Tablet 3    bumetanide (BUMEX) 1 mg tablet Take 1 Tab by mouth as needed (SOB, swelling). 30 Tab 0    spironolactone (ALDACTONE) 25 mg tablet TAKE 1 TABLET BY MOUTH EVERY DAY 90 Tab 1    glucose blood VI test strips (OneTouch Ultra Test) strip Test Blood Sugar once daily Dx. Code E11.49 150 Strip 3    Slow Fe 142 mg (45 mg iron) ER tablet TAKE 1 TABLET BY MOUTH TWICE A DAY      glucose blood VI test strips (ASCENSIA AUTODISC VI, ONE TOUCH ULTRA TEST VI) strip by Does Not Apply route daily. 35 Strip 11    Oxygen-Air Delivery Systems 2 lpm at night 1 Each 0    aspirin 81 mg tablet Take 81 mg by mouth. Allergies   Allergen Reactions    Lortab [Hydrocodone-Acetaminophen] Other (comments)     Nightmares             SOCIAL HISTORY:     Social History     Tobacco Use    Smoking status: Former Smoker     Packs/day: 1.50     Years: 25.00     Pack years: 37.50     Quit date: 1982     Years since quittin.7    Smokeless tobacco: Former User     Quit date: 2005   Vaping Use    Vaping Use: Never used   Substance Use Topics    Alcohol use: Yes     Alcohol/week: 7.0 standard drinks     Types: 7 Glasses of wine per week    Drug use: No         FAMILY HISTORY:     Family History   Problem Relation Age of Onset    Stroke Mother     Heart Disease Father     Diabetes Father     Stroke Brother     Heart Disease Brother     Other Brother         AAA    Heart Disease Brother     Other Brother         AAA    Other Brother         anuerysms in legs and AAA, prediabetic    No Known Problems Daughter     No Known Problems Sister     Heart Disease Brother     Other Brother         AAA    Other Brother         Lyme disease?, prediabetic            REVIEW OF SYMPTOMS:      Review of Symptoms:  Constitutional: Negative for fever, chills  HEENT: Negative for nosebleeds, tinnitus, and vision changes.    Respiratory: Negative for cough, wheezing  Cardiovascular: Negative for orthopnea, claudication, syncope, and PND.   Gastrointestinal: Negative for abdominal pain, diarrhea, melena. Genitourinary: Negative for dysuria  Musculoskeletal: Negative for myalgias. Skin: Negative for rash  Heme: No problems bleeding. Neurological: Negative for speech change and focal weakness.            PHYSICAL EXAM:      Physical Exam:  There were no vitals taken for this visit. Patient appears generally well, mood and affect are appropriate and pleasant. HEENT:  Hearing intact, non-icteric, normocephalic, atraumatic. Neck Exam: Supple, No JVD   Lung Exam: Clear to auscultation, even breath sounds. Cardiac Exam: Regular rate and rhythm with no murmur or rub  Abdomen: Soft, non-tender, normal bowel sounds. Obese   Extremities: Moves all ext well. No lower extremity edema. Psych: Appropriate affect  Neuro - Grossly intact        LABS / OTHER STUDIES:         Lab Results   Component Value Date/Time    Sodium 139 10/04/2021 05:28 AM    Potassium 4.1 10/04/2021 05:28 AM    Chloride 107 10/04/2021 05:28 AM    CO2 23 10/04/2021 05:28 AM    Anion gap 9 10/04/2021 05:28 AM    Glucose 149 (H) 10/04/2021 05:28 AM    BUN 24 (H) 10/04/2021 05:28 AM    Creatinine 1.09 10/04/2021 05:28 AM    BUN/Creatinine ratio 22 (H) 10/04/2021 05:28 AM    GFR est AA >60 10/04/2021 05:28 AM    GFR est non-AA >60 10/04/2021 05:28 AM    Calcium 9.0 10/04/2021 05:28 AM    Bilirubin, total 0.5 10/04/2021 05:28 AM    Alk.  phosphatase 70 10/04/2021 05:28 AM    Protein, total 7.5 10/04/2021 05:28 AM    Albumin 3.6 10/04/2021 05:28 AM    Globulin 3.9 10/04/2021 05:28 AM    A-G Ratio 0.9 (L) 10/04/2021 05:28 AM    ALT (SGPT) 18 10/04/2021 05:28 AM    AST (SGOT) 15 10/04/2021 05:28 AM     Lab Results   Component Value Date/Time    WBC 10.9 10/04/2021 05:28 AM    HGB 11.0 (L) 10/04/2021 05:28 AM    HCT 35.9 (L) 10/04/2021 05:28 AM    PLATELET 434 87/51/5406 05:28 AM    MCV 87.1 10/04/2021 05:28 AM       Lab Results   Component Value Date/Time    Cholesterol, total 107 09/01/2021 08:53 AM    HDL Cholesterol 42 06/01/2021 10:38 AM    LDL,Direct 119 (H) 07/06/2010 09:00 AM    LDL, calculated 59.8 06/01/2021 10:38 AM    VLDL, calculated 46.2 06/01/2021 10:38 AM    Triglyceride 231 (H) 06/01/2021 10:38 AM    CHOL/HDL Ratio 3.5 06/01/2021 10:38 AM       Component      Latest Ref Rng & Units 10/4/2021 10/4/2021           5:28 AM  5:28 AM   NT pro-BNP      <450 PG/ML  910 (H)   Troponin-I, Qt.      <0.05 ng/mL <0.05              CARDIAC DIAGNOSTICS:      Cardiac Evaluation Includes:     EKG 11/9/17 - NSR, 1st degree AVB, RBBB  EKG 6/29/20 - NSR, 1st degree AVB, RBBB        Went to ED with SOB on 3/14/20 --> treated for CHF based on symptoms   Labs 3/14/20 - trop neg, BMP OK, Hgb 10.5, proBNP 307  CXR 3/14/20 - vascular congestion   Echo 3/14/20 (W) - suboptimal study - unable to use Definity.  LVEF 55%     Echo 8/14/20 - LVEF 60-65%, grade 3 diastology, AV calcifications, mod LAE     CT Heart Scan 10/7/20 - 1. CAC Score 2426.    2. Mild emphysema. 3. Mild segmental bronchiectasis and air trapping in the left lower lobe. 4. Possible cirrhosis. Spleen at the upper limits of normal in size.        Cardiac Cath 10/14/20 - LCX 50%, IFFR 0.98. RCA 30%. mLAD with sequential 30-70% lesions -----> iFFR mLAD 0.8 --> RUBÉN to mLED , PASP 50 mmHg, PCWP 14, LVEDP 10            ASSESSMENT AND PLAN:      Assessment and Plan:     1) CAD  - RUBÉN to mLAD stenosis 10/14/20   - cont statin   - he denies CP and is working in the [employer] [facility]    - Will plan to stop plavix soon, but first want to make sure cardiac status stable first     2) HTN  - BP high ---> will see how it does with diuresis.        3)  HFpEF  - He says he had CHF treated in March 2020 at [address] [facility] - says his LVEF was normal   --> Lately he has had more ANGELES  - Seen in ED on 10/4/21 for SOB and hypoxia noted ---> Home O2 added then for SOB and hypoxia in the ED---> continue aldactone -- make bumex 1 mg daily - recheck an echo - consider [place] later  ---> asked him to see Dr. Di Reyes (he had prior problems with allergy to birds)      4) Dyslipidemia  - prior lipids OK   - cont statin - follow labs      5) GEORGES - on CPAP and O2 at night      6) See me in 1 month. Patient expressed understanding of the plan - questions were answered.         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  Has not spoken to his one daughter in 4+ years.          Anant Orourke MD, [address], [address]      [id number] [address]  [address]  Ph: [phone]                                [phone]       ADDENDUM   10/13/2021  10/13/21    ECHO ADULT COMPLETE 10/13/2021 10/13/2021    Interpretation Summary  · LV: Estimated LVEF is 60 - 65%. Normal cavity size and systolic function (ejection fraction normal). Upper normal wall thickness. Wall motion: normal. Moderate (grade 2) left ventricular diastolic dysfunction. · LA: Mildly dilated left atrium. Left Atrium volume index is 38.68 mL/m2. · RV: Mildly dilated right ventricle. · RA: Mildly dilated right atrium. · AV sclerosis    Signed by: Jacklyn Sofia MD on 10/13/2021  5:26 PM    Echo stable - will have nurse call.

## 2021-10-13 ENCOUNTER — ANCILLARY PROCEDURE (OUTPATIENT)
Dept: CARDIOLOGY CLINIC | Age: 81
End: 2021-10-13
Payer: MEDICARE

## 2021-10-13 VITALS
DIASTOLIC BLOOD PRESSURE: 70 MMHG | SYSTOLIC BLOOD PRESSURE: 146 MMHG | BODY MASS INDEX: 31.84 KG/M2 | WEIGHT: 215 LBS | HEIGHT: 69 IN

## 2021-10-13 DIAGNOSIS — I50.32 CHRONIC HEART FAILURE WITH PRESERVED EJECTION FRACTION (HCC): ICD-10-CM

## 2021-10-13 DIAGNOSIS — I25.10 CORONARY ARTERY DISEASE INVOLVING NATIVE HEART WITHOUT ANGINA PECTORIS, UNSPECIFIED VESSEL OR LESION TYPE: ICD-10-CM

## 2021-10-13 DIAGNOSIS — R06.02 SOB (SHORTNESS OF BREATH): ICD-10-CM

## 2021-10-13 LAB
ECHO AO ROOT DIAM: 3.32 CM
ECHO AV AREA PEAK VELOCITY: 3 CM2
ECHO AV AREA VTI: 2.8 CM2
ECHO AV AREA/BSA PEAK VELOCITY: 1.4 CM2/M2
ECHO AV AREA/BSA VTI: 1.3 CM2/M2
ECHO AV MEAN GRADIENT: 4.22 MMHG
ECHO AV PEAK GRADIENT: 7.48 MMHG
ECHO AV PEAK VELOCITY: 136.73 CM/S
ECHO AV VTI: 29.93 CM
ECHO LA AREA 4C: 23.49 CM2
ECHO LA MAJOR AXIS: 4.52 CM
ECHO LA MINOR AXIS: 2.12 CM
ECHO LA VOL 2C: 88.83 ML (ref 18–58)
ECHO LA VOL 4C: 67.69 ML (ref 18–58)
ECHO LA VOL BP: 82.4 ML (ref 18–58)
ECHO LA VOL/BSA BIPLANE: 38.69 ML/M2 (ref 16–28)
ECHO LA VOLUME INDEX A2C: 41.7 ML/M2 (ref 16–28)
ECHO LA VOLUME INDEX A4C: 31.78 ML/M2 (ref 16–28)
ECHO LV E' LATERAL VELOCITY: 8.58 CM/S
ECHO LV E' SEPTAL VELOCITY: 6.99 CM/S
ECHO LV EDV A2C: 71.79 ML
ECHO LV EDV A4C: 115.57 ML
ECHO LV EDV BP: 96.4 ML (ref 67–155)
ECHO LV EDV INDEX A4C: 54.3 ML/M2
ECHO LV EDV INDEX BP: 45.3 ML/M2
ECHO LV EDV NDEX A2C: 33.7 ML/M2
ECHO LV EJECTION FRACTION A2C: 55 PERCENT
ECHO LV EJECTION FRACTION A4C: 63 PERCENT
ECHO LV EJECTION FRACTION BIPLANE: 59.6 PERCENT (ref 55–100)
ECHO LV ESV A2C: 32.15 ML
ECHO LV ESV A4C: 42.6 ML
ECHO LV ESV BP: 38.99 ML (ref 22–58)
ECHO LV ESV INDEX A2C: 15.1 ML/M2
ECHO LV ESV INDEX A4C: 20 ML/M2
ECHO LV ESV INDEX BP: 18.3 ML/M2
ECHO LV INTERNAL DIMENSION DIASTOLIC: 4.43 CM (ref 4.2–5.9)
ECHO LV INTERNAL DIMENSION SYSTOLIC: 2.92 CM
ECHO LV IVSD: 1.04 CM (ref 0.6–1)
ECHO LV MASS 2D: 159.9 G (ref 88–224)
ECHO LV MASS INDEX 2D: 75.1 G/M2 (ref 49–115)
ECHO LV POSTERIOR WALL DIASTOLIC: 1.06 CM (ref 0.6–1)
ECHO LVOT DIAM: 1.96 CM
ECHO LVOT PEAK GRADIENT: 7.36 MMHG
ECHO LVOT PEAK VELOCITY: 135.68 CM/S
ECHO LVOT SV: 83.9 ML
ECHO LVOT VTI: 27.77 CM
ECHO MV A VELOCITY: 70.79 CM/S
ECHO MV AREA PHT: 4.39 CM2
ECHO MV E DECELERATION TIME (DT): 172.93 MS
ECHO MV E VELOCITY: 131.21 CM/S
ECHO MV E/A RATIO: 1.85
ECHO MV E/E' LATERAL: 15.29
ECHO MV E/E' RATIO (AVERAGED): 17.03
ECHO MV E/E' SEPTAL: 18.77
ECHO MV PRESSURE HALF TIME (PHT): 50.15 MS
ECHO RA AREA 4C: 20.07 CM2
ECHO RV INTERNAL DIMENSION: 5.1 CM
ECHO RV TAPSE: 2.94 CM (ref 1.5–2)
LA VOL DISK BP: 77.32 ML (ref 18–58)

## 2021-10-13 PROCEDURE — 93306 TTE W/DOPPLER COMPLETE: CPT | Performed by: SPECIALIST

## 2021-10-15 ENCOUNTER — TELEPHONE (OUTPATIENT)
Dept: CARDIOLOGY CLINIC | Age: 81
End: 2021-10-15

## 2021-10-15 NOTE — TELEPHONE ENCOUNTER
Spoke to pt, confirmed ID x2,  Per Dr. Mariaelena Patino: Richa Corey you please let him know echo 10/13/21 is stable - will diurese with bumex as planned. Thanks\"  Confirmed Bumex 1 mg daily. Lost 8 lbs in about 24 hrs, breathing is much better.   Confirmed NOV  Future Appointments   Date Time Provider Mal Ginny   11/17/2021 11:20 AM MD FIDEL Jack BS AMB   1/5/2022  8:40 AM Albert Juares MD [facility] BS AMB

## 2021-10-29 ENCOUNTER — TELEPHONE (OUTPATIENT)
Dept: FAMILY MEDICINE CLINIC | Age: 81
End: 2021-10-29

## 2021-10-29 NOTE — TELEPHONE ENCOUNTER
Certificate of Medical Necessity forms for oxygen placed in red folder and placed in Dr. Grant Pepe folder.

## 2021-11-17 ENCOUNTER — OFFICE VISIT (OUTPATIENT)
Dept: CARDIOLOGY CLINIC | Age: 81
End: 2021-11-17
Payer: MEDICARE

## 2021-11-17 VITALS
HEART RATE: 56 BPM | SYSTOLIC BLOOD PRESSURE: 138 MMHG | BODY MASS INDEX: 31.55 KG/M2 | WEIGHT: 213 LBS | HEIGHT: 69 IN | DIASTOLIC BLOOD PRESSURE: 60 MMHG | OXYGEN SATURATION: 96 %

## 2021-11-17 DIAGNOSIS — I50.33 ACUTE ON CHRONIC HEART FAILURE WITH PRESERVED EJECTION FRACTION (HCC): ICD-10-CM

## 2021-11-17 DIAGNOSIS — R06.02 SOB (SHORTNESS OF BREATH): Primary | ICD-10-CM

## 2021-11-17 PROCEDURE — G8536 NO DOC ELDER MAL SCRN: HCPCS | Performed by: SPECIALIST

## 2021-11-17 PROCEDURE — G8427 DOCREV CUR MEDS BY ELIG CLIN: HCPCS | Performed by: SPECIALIST

## 2021-11-17 PROCEDURE — 1101F PT FALLS ASSESS-DOCD LE1/YR: CPT | Performed by: SPECIALIST

## 2021-11-17 PROCEDURE — G8752 SYS BP LESS 140: HCPCS | Performed by: SPECIALIST

## 2021-11-17 PROCEDURE — G8432 DEP SCR NOT DOC, RNG: HCPCS | Performed by: SPECIALIST

## 2021-11-17 PROCEDURE — G8417 CALC BMI ABV UP PARAM F/U: HCPCS | Performed by: SPECIALIST

## 2021-11-17 PROCEDURE — G8754 DIAS BP LESS 90: HCPCS | Performed by: SPECIALIST

## 2021-11-17 PROCEDURE — G0463 HOSPITAL OUTPT CLINIC VISIT: HCPCS | Performed by: SPECIALIST

## 2021-11-17 PROCEDURE — 99214 OFFICE O/P EST MOD 30 MIN: CPT | Performed by: SPECIALIST

## 2021-11-17 NOTE — PROGRESS NOTES
Yenny Kenny MD. Formerly [facility] [place]              Patient: Leonel Spurling  : 1940      Today's Date: 2021            HISTORY OF PRESENT ILLNESS:     History of Present Illness:  Was in  ED 10/4/21 with SOB. proBNP minimally elevated. Home O2 recommended for hypoxia. Still has SOB - class 3 ANGELES. Not any better lately. Still has LE edema. PAST MEDICAL HISTORY:     Past Medical History:   Diagnosis Date    Arthritis     CAD (coronary artery disease)     [facility]     Diabetes [facility]     Diverticular disease of colon 2010    DM (diabetes mellitus) type II controlled, neurological manifestation ([facility] 2014    Encounter for long-term (current) use of other medications     Hypertension     Mixed hyperlipidemia 2010    pt reports medication was started due to history of DM    Obesity     GEORGES (obstructive sleep apnea) 2010    Unspecified sleep apnea     O2 AT NIGHT 2L, CANNOT USE CPAP MASK         Past Surgical History:   Procedure Laterality Date    HX KNEE ARTHROSCOPY Left     HX KNEE ARTHROSCOPY Right     HX LUMBAR LAMINECTOMY      HX OTHER SURGICAL  1971    CIRCUMCISION, VASECTOMY    HX TOTAL COLECTOMY  1997    RESECTION    HX WISDOM TEETH EXTRACTION             MEDICATIONS:     Current Outpatient Medications   Medication Sig Dispense Refill    spironolactone (ALDACTONE) 25 mg tablet Take 1 Tablet by mouth daily. 90 Tablet 3    bumetanide (BUMEX) 1 mg tablet Take 1 Tablet by mouth daily. 90 Tablet 3    amLODIPine (NORVASC) 10 mg tablet TAKE 1 TABLET BY MOUTH EVERY DAY 90 Tablet 4    clopidogreL (PLAVIX) 75 mg tab TAKE 1 TABLET BY MOUTH EVERY DAY 90 Tablet 1    atorvastatin (LIPITOR) 40 mg tablet TAKE 1 TABLET BY MOUTH EVERY DAY AT NIGHT 90 Tablet 3    quinapriL (ACCUPRIL) 10 mg tablet TAKE 1 TABLET DAILY 90 Tablet 3    metFORMIN (GLUCOPHAGE) 850 mg tablet Take 1 Tablet by mouth three (3) times daily.  270 Tablet 3    glipiZIDE (GLUCOTROL) 10 mg tablet TAKE 1 TABLET TWICE A  Tablet 3    glucose blood VI test strips (OneTouch Ultra Test) strip Test Blood Sugar once daily Dx. Code E11.49 150 Strip 3    Slow Fe 142 mg (45 mg iron) ER tablet TAKE 1 TABLET BY MOUTH TWICE A DAY      Oxygen-Air Delivery Systems 2 lpm at night 1 Each 0    aspirin 81 mg tablet Take 81 mg by mouth.  gabapentin (NEURONTIN) 100 mg capsule Take 1 Capsule by mouth nightly. Max Daily Amount: 100 mg. (Patient not taking: Reported on 2021) 30 Capsule 5    glucose blood VI test strips (ASCENSIA AUTODISC VI, ONE TOUCH ULTRA TEST VI) strip by Does Not Apply route daily. (Patient not taking: Reported on 2021) 35 Strip 11       Allergies   Allergen Reactions    Lortab [Hydrocodone-Acetaminophen] Other (comments)     Nightmares             SOCIAL HISTORY:     Social History     Tobacco Use    Smoking status: Former Smoker     Packs/day: 1.50     Years: 25.00     Pack years: 37.50     Quit date: 1982     Years since quittin.8    Smokeless tobacco: Former User     Quit date: 2005   Vaping Use    Vaping Use: Never used   Substance Use Topics    Alcohol use: Yes     Alcohol/week: 7.0 standard drinks     Types: 7 Glasses of wine per week    Drug use: No         FAMILY HISTORY:     Family History   Problem Relation Age of Onset    Stroke Mother     Heart Disease Father     Diabetes Father     Stroke Brother     Heart Disease Brother     Other Brother         AAA    Heart Disease Brother     Other Brother         AAA    Other Brother         anuerysms in legs and AAA, prediabetic    No Known Problems Daughter     No Known Problems Sister     Heart Disease Brother     Other Brother         AAA    Other Brother         Lyme disease?, prediabetic            REVIEW OF SYMPTOMS:      Review of Symptoms:  Constitutional: Negative for fever, chills  HEENT: Negative for nosebleeds, tinnitus, and vision changes. Respiratory: + occ wakes up coughing   Cardiovascular: Negative for  Syncope   Gastrointestinal: Negative for abdominal pain, diarrhea, melena. Genitourinary: Negative for dysuria  Musculoskeletal: Negative for myalgias. Skin: Negative for rash  Heme: No problems bleeding. Neurological: Negative for speech change and focal weakness.            PHYSICAL EXAM:      Physical Exam:  Visit Vitals  /60 (BP 1 Location: Left upper arm, BP Patient Position: Sitting, BP Cuff Size: Adult)   Pulse (!) 56   Ht 5' 9\" (1.753 m)   Wt 213 lb (96.6 kg)   SpO2 96%   BMI 31.45 kg/m²       Patient appears generally well, mood and affect are appropriate and pleasant. HEENT:  Hearing intact, non-icteric, normocephalic, atraumatic. Neck Exam: Supple, No JVD   Lung Exam: Clear to auscultation, even breath sounds. Cardiac Exam: Regular rate and rhythm with no murmur or rub  Abdomen: Soft, non-tender, normal bowel sounds. Obese   Extremities: Moves all ext well. Mild bilat lower extremity edema. Psych: Appropriate affect  Neuro - Grossly intact        LABS / OTHER STUDIES:         Lab Results   Component Value Date/Time    Sodium 139 10/04/2021 05:28 AM    Potassium 4.1 10/04/2021 05:28 AM    Chloride 107 10/04/2021 05:28 AM    CO2 23 10/04/2021 05:28 AM    Anion gap 9 10/04/2021 05:28 AM    Glucose 149 (H) 10/04/2021 05:28 AM    BUN 24 (H) 10/04/2021 05:28 AM    Creatinine 1.09 10/04/2021 05:28 AM    BUN/Creatinine ratio 22 (H) 10/04/2021 05:28 AM    GFR est AA >60 10/04/2021 05:28 AM    GFR est non-AA >60 10/04/2021 05:28 AM    Calcium 9.0 10/04/2021 05:28 AM    Bilirubin, total 0.5 10/04/2021 05:28 AM    Alk.  phosphatase 70 10/04/2021 05:28 AM    Protein, total 7.5 10/04/2021 05:28 AM    Albumin 3.6 10/04/2021 05:28 AM    Globulin 3.9 10/04/2021 05:28 AM    A-G Ratio 0.9 (L) 10/04/2021 05:28 AM    ALT (SGPT) 18 10/04/2021 05:28 AM    AST (SGOT) 15 10/04/2021 05:28 AM     Lab Results   Component Value Date/Time    WBC 10.9 10/04/2021 05:28 AM    HGB 11.0 (L) 10/04/2021 05:28 AM    HCT 35.9 (L) 10/04/2021 05:28 AM    PLATELET 555 87/42/9087 05:28 AM    MCV 87.1 10/04/2021 05:28 AM       Lab Results   Component Value Date/Time    Cholesterol, total 107 09/01/2021 08:53 AM    HDL Cholesterol 42 06/01/2021 10:38 AM    LDL,Direct 119 (H) 07/06/2010 09:00 AM    LDL, calculated 59.8 06/01/2021 10:38 AM    VLDL, calculated 46.2 06/01/2021 10:38 AM    Triglyceride 231 (H) 06/01/2021 10:38 AM    CHOL/HDL Ratio 3.5 06/01/2021 10:38 AM       Component      Latest Ref Rng & Units 10/4/2021 10/4/2021           5:28 AM  5:28 AM   NT pro-BNP      <450 PG/ML  910 (H)   Troponin-I, Qt.      <0.05 ng/mL <0.05              CARDIAC DIAGNOSTICS:      Cardiac Evaluation Includes:     EKG 11/9/17 - NSR, 1st degree AVB, RBBB  EKG 6/29/20 - NSR, 1st degree AVB, RBBB        Went to ED with SOB on 3/14/20 --> treated for CHF based on symptoms   Labs 3/14/20 - trop neg, BMP OK, Hgb 10.5, proBNP 307  CXR 3/14/20 - vascular congestion   Echo 3/14/20 ([facility]) - suboptimal study - unable to use Definity.  LVEF 55%     Echo 8/14/20 - LVEF 60-65%, grade 3 diastology, AV calcifications, mod LAE     CT Heart Scan 10/7/20 - 1. CAC Score 2426.    2. Mild emphysema. 3. Mild segmental bronchiectasis and air trapping in the left lower lobe. 4. Possible cirrhosis. Spleen at the upper limits of normal in size.        Cardiac Cath 10/14/20 - LCX 50%, IFFR 0.98. RCA 30%.   mLAD with sequential 30-70% lesions -----> iFFR mLAD 0.8 --> RUBÉN to mLED , PASP 50 mmHg, PCWP 14, LVEDP 10      Echo 10/13/21 - LVEF 60-65%, grade 2 diastology, mild LAE, mild RV enlargement           ASSESSMENT AND PLAN:      Assessment and Plan:     1) CAD  - RUBÉN to mLAD stenosis 10/14/20   - cont statin   - he denies CP and is working in the yard [employer]    - Will plan to stop plavix soon, but first want to make sure cardiac status stable first     2) HTN  - BP OK      3)  HFpEF  - He says he had CHF treated in March 2020 at [facility] - says his LVEF was normal   - On 10/21 ---> Lately he has had more ANGELES - Seen in ED on 10/4/21 for SOB and hypoxia noted ---> Home O2 added then for SOB and hypoxia in the ED---> continue aldactone -- make bumex 1 mg daily - recheck an echo - consider [place] later ---> asked him to see Dr. Samantha Frederick (he had prior problems with allergy to birds)   - On 11/17/21 - Still with ANGELES. Add Farxiga 10 mg daily. Cont Bumex and aldactone. Recheck labs in 3 weeks. 4) Dyslipidemia  - prior lipids OK   - cont statin - follow labs      5) GEORGES - on CPAP and O2 at night      6) See me in 1 month. Patient expressed understanding of the plan - questions were answered.         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  Has not spoken to his one daughter in 4+ years.          Dylan Lindquist MD, [facility] [address], [address] [id number] [address] [place] [address].  [address]  [facility], [address] [place] [address]  Ph: [phone]                                [phone]

## 2021-12-15 ENCOUNTER — OFFICE VISIT (OUTPATIENT)
Dept: CARDIOLOGY CLINIC | Age: 81
End: 2021-12-15
Payer: MEDICARE

## 2021-12-15 VITALS
SYSTOLIC BLOOD PRESSURE: 138 MMHG | HEIGHT: 69 IN | HEART RATE: 56 BPM | OXYGEN SATURATION: 94 % | WEIGHT: 213 LBS | BODY MASS INDEX: 31.55 KG/M2 | DIASTOLIC BLOOD PRESSURE: 58 MMHG

## 2021-12-15 DIAGNOSIS — R06.02 SOB (SHORTNESS OF BREATH): Primary | ICD-10-CM

## 2021-12-15 DIAGNOSIS — I25.10 CORONARY ARTERY DISEASE INVOLVING NATIVE HEART WITHOUT ANGINA PECTORIS, UNSPECIFIED VESSEL OR LESION TYPE: ICD-10-CM

## 2021-12-15 PROCEDURE — G8752 SYS BP LESS 140: HCPCS | Performed by: SPECIALIST

## 2021-12-15 PROCEDURE — G8427 DOCREV CUR MEDS BY ELIG CLIN: HCPCS | Performed by: SPECIALIST

## 2021-12-15 PROCEDURE — G8432 DEP SCR NOT DOC, RNG: HCPCS | Performed by: SPECIALIST

## 2021-12-15 PROCEDURE — G0463 HOSPITAL OUTPT CLINIC VISIT: HCPCS | Performed by: SPECIALIST

## 2021-12-15 PROCEDURE — G8754 DIAS BP LESS 90: HCPCS | Performed by: SPECIALIST

## 2021-12-15 PROCEDURE — G8417 CALC BMI ABV UP PARAM F/U: HCPCS | Performed by: SPECIALIST

## 2021-12-15 PROCEDURE — 99214 OFFICE O/P EST MOD 30 MIN: CPT | Performed by: SPECIALIST

## 2021-12-15 PROCEDURE — 1101F PT FALLS ASSESS-DOCD LE1/YR: CPT | Performed by: SPECIALIST

## 2021-12-15 PROCEDURE — G8536 NO DOC ELDER MAL SCRN: HCPCS | Performed by: SPECIALIST

## 2021-12-15 NOTE — PROGRESS NOTES
Jessica Padilla MD. [facility] [place]              Patient: Anu Luis  : 1940      Today's Date: 12/15/2021            HISTORY OF PRESENT ILLNESS:     History of Present Illness:  Was in  ED 10/4/21 with SOB. proBNP minimally elevated. Home O2 recommended for hypoxia. Still has SOB - class 2 ANGELES. Not any better lately. Still has LE edema. On 12/15/21- no change lately. Gets SOB splitting wood or climbing stairs --- but OK walking on level ground. He feels no different on Farxiga. No CP. No orthopnea. PAST MEDICAL HISTORY:     Past Medical History:   Diagnosis Date    Arthritis     CAD (coronary artery disease)     [facility]     Diabetes [facility]     Diverticular disease of colon 2010    DM (diabetes mellitus) type II controlled, neurological manifestation ([facility] 2014    Encounter for long-term (current) use of other medications     Hypertension     Mixed hyperlipidemia 2010    pt reports medication was started due to history of DM    Obesity     GEORGES (obstructive sleep apnea) 2010    Unspecified sleep apnea     O2 AT NIGHT 2L, CANNOT USE CPAP MASK         Past Surgical History:   Procedure Laterality Date    HX KNEE ARTHROSCOPY Left     HX KNEE ARTHROSCOPY Right     HX LUMBAR LAMINECTOMY      HX OTHER SURGICAL  1971    CIRCUMCISION, VASECTOMY    HX TOTAL COLECTOMY  1997    RESECTION    HX WISDOM TEETH EXTRACTION             MEDICATIONS:     Current Outpatient Medications   Medication Sig Dispense Refill    dapagliflozin (Farxiga) 10 mg tab tablet Take 1 Tablet by mouth daily. 30 Tablet 12    spironolactone (ALDACTONE) 25 mg tablet Take 1 Tablet by mouth daily. 90 Tablet 3    bumetanide (BUMEX) 1 mg tablet Take 1 Tablet by mouth daily.  90 Tablet 3    amLODIPine (NORVASC) 10 mg tablet TAKE 1 TABLET BY MOUTH EVERY DAY 90 Tablet 4    clopidogreL (PLAVIX) 75 mg tab TAKE 1 TABLET BY MOUTH EVERY DAY 90 Tablet 1    atorvastatin (LIPITOR) 40 mg tablet TAKE 1 TABLET BY MOUTH EVERY DAY AT NIGHT 90 Tablet 3    quinapriL (ACCUPRIL) 10 mg tablet TAKE 1 TABLET DAILY 90 Tablet 3    metFORMIN (GLUCOPHAGE) 850 mg tablet Take 1 Tablet by mouth three (3) times daily. 270 Tablet 3    glipiZIDE (GLUCOTROL) 10 mg tablet TAKE 1 TABLET TWICE A  Tablet 3    glucose blood VI test strips (OneTouch Ultra Test) strip Test Blood Sugar once daily Dx. Code E11.49 150 Strip 3    Slow Fe 142 mg (45 mg iron) ER tablet TAKE 1 TABLET BY MOUTH TWICE A DAY      Oxygen-Air Delivery Systems 2 lpm at night 1 Each 0    aspirin 81 mg tablet Take 81 mg by mouth.  gabapentin (NEURONTIN) 100 mg capsule Take 1 Capsule by mouth nightly. Max Daily Amount: 100 mg. (Patient not taking: Reported on 2021) 30 Capsule 5    glucose blood VI test strips (ASCENSIA AUTODISC VI, ONE TOUCH ULTRA TEST VI) strip by Does Not Apply route daily. (Patient not taking: Reported on 2021) 35 Strip 11       Allergies   Allergen Reactions    Lortab [Hydrocodone-Acetaminophen] Other (comments)     Nightmares             SOCIAL HISTORY:     Social History     Tobacco Use    Smoking status: Former Smoker     Packs/day: 1.50     Years: 25.00     Pack years: 37.50     Quit date: 1982     Years since quittin.9    Smokeless tobacco: Former User     Quit date: 2005   Vaping Use    Vaping Use: Never used   Substance Use Topics    Alcohol use:  Yes     Alcohol/week: 7.0 standard drinks     Types: 7 Glasses of wine per week    Drug use: No         FAMILY HISTORY:     Family History   Problem Relation Age of Onset   [place] Stroke Mother     Heart Disease Father     Diabetes Father     Stroke Brother     Heart Disease Brother     Other Brother         AAA    Heart Disease Brother     Other Brother         AAA    Other Brother         anuerysms in legs and AAA, prediabetic    No Known Problems Daughter     No Known Problems Sister     Heart Disease Brother     Other Brother         AAA    Other Brother         Lyme disease?, prediabetic            REVIEW OF SYMPTOMS:      Review of Symptoms:  Constitutional: Negative for fever, chills  HEENT: Negative for nosebleeds, tinnitus, and vision changes. Respiratory: + occ wakes up coughing   Cardiovascular: Negative for  Syncope   Gastrointestinal: Negative for abdominal pain, diarrhea, melena. Genitourinary: Negative for dysuria  Musculoskeletal: Negative for myalgias. Skin: Negative for rash  Heme: No problems bleeding. Neurological: Negative for speech change and focal weakness.            PHYSICAL EXAM:      Physical Exam:  Visit Vitals  BP (!) 138/58 (BP 1 Location: Right upper arm, BP Patient Position: Sitting, BP Cuff Size: Large adult)   Pulse (!) 56   Ht 5' 9\" (1.753 m)   Wt 213 lb (96.6 kg)   SpO2 94%   BMI 31.45 kg/m²       Patient appears generally well, mood and affect are appropriate and pleasant. HEENT:  Hearing intact, non-icteric, normocephalic, atraumatic. Neck Exam: Supple, No JVD   Lung Exam: Clear to auscultation, even breath sounds. Cardiac Exam: Regular rate and rhythm with no murmur or rub  Abdomen: Soft, non-tender, normal bowel sounds. Obese   Extremities: Moves all ext well. Mild bilat lower extremity edema. Psych: Appropriate affect  Neuro - Grossly intact        LABS / OTHER STUDIES:         Lab Results   Component Value Date/Time    Sodium 139 10/04/2021 05:28 AM    Potassium 4.1 10/04/2021 05:28 AM    Chloride 107 10/04/2021 05:28 AM    CO2 23 10/04/2021 05:28 AM    Anion gap 9 10/04/2021 05:28 AM    Glucose 149 (H) 10/04/2021 05:28 AM    BUN 24 (H) 10/04/2021 05:28 AM    Creatinine 1.09 10/04/2021 05:28 AM    BUN/Creatinine ratio 22 (H) 10/04/2021 05:28 AM    GFR est AA >60 10/04/2021 05:28 AM    GFR est non-AA >60 10/04/2021 05:28 AM    Calcium 9.0 10/04/2021 05:28 AM    Bilirubin, total 0.5 10/04/2021 05:28 AM    Alk.  phosphatase 70 10/04/2021 05:28 AM Protein, total 7.5 10/04/2021 05:28 AM    Albumin 3.6 10/04/2021 05:28 AM    Globulin 3.9 10/04/2021 05:28 AM    A-G Ratio 0.9 (L) 10/04/2021 05:28 AM    ALT (SGPT) 18 10/04/2021 05:28 AM    AST (SGOT) 15 10/04/2021 05:28 AM     Lab Results   Component Value Date/Time    WBC 10.9 10/04/2021 05:28 AM    HGB 11.0 (L) 10/04/2021 05:28 AM    HCT 35.9 (L) 10/04/2021 05:28 AM    PLATELET 474 69/66/9220 05:28 AM    MCV 87.1 10/04/2021 05:28 AM               CARDIAC DIAGNOSTICS:      Cardiac Evaluation Includes:     EKG 11/9/17 - NSR, 1st degree AVB, RBBB  EKG 6/29/20 - NSR, 1st degree AVB, RBBB        Went to ED with SOB on 3/14/20 --> treated for CHF based on symptoms   Labs 3/14/20 - trop neg, BMP OK, Hgb 10.5, proBNP 307  CXR 3/14/20 - vascular congestion   Echo 3/14/20 ([facility]) - suboptimal study - unable to use Definity.  LVEF 55%     Echo 8/14/20 - LVEF 60-65%, grade 3 diastology, AV calcifications, mod LAE     CT Heart Scan 10/7/20 - 1. CAC Score 2426.    2. Mild emphysema. 3. Mild segmental bronchiectasis and air trapping in the left lower lobe. 4. Possible cirrhosis. Spleen at the upper limits of normal in size.        Cardiac Cath 10/14/20 - LCX 50%, IFFR 0.98. RCA 30%.   mLAD with sequential 30-70% lesions -----> iFFR mLAD 0.8 --> RUBÉN to mLED , PASP 50 mmHg, PCWP 14, LVEDP 10      Echo 10/13/21 - LVEF 60-65%, grade 2 diastology, mild LAE, mild RV enlargement           ASSESSMENT AND PLAN:      Assessment and Plan:     1) CAD  - RUBÉN to mLAD stenosis 10/14/20   - cont statin   - he denies CP but has ANGELES (class 2)   - Will plan to stop plavix soon, but first want to make sure cardiac status stable first   - Recommended checking an exercise cardiolite given recent ANGELES -- but patient wants to wait and consider the stress test later after he sees Pulmonary     2) HTN  - BP OK - cont meds      3)  HFpEF  - He says he had CHF treated in March 2020 at [address] - says his LVEF was normal   - On 10/21 ---> Lately he has had more ANGELES - Seen in ED on 10/4/21 for SOB and hypoxia noted ---> Home O2 added then for SOB and hypoxia in the ED---> continue aldactone -- make bumex 1 mg daily - recheck an echo - consider [place] later ---> asked him to see Dr. Samantha Frederick (he had prior problems with allergy to birds)   - On 11/17/21 - Still with ANGELES. Add Farxiga 10 mg daily. Cont Bumex and aldactone. Recheck labs in 3 weeks. - On 12/15/21 - still with ANGELES class 2 -- (no orthopnea, OK walking on level ground, SOB climbing stairs). No improvement with [place] so he can stop if not affordable. He is to see Dr. Samantha Frederick soon. Also to check labs soon. He seems volume compensated. I suspect problems could be at least partially pulmonary in nature? 4) Dyslipidemia  - prior lipids OK   - cont statin - follow labs      5) GEORGES - on CPAP and O2 at night      6) See me in 6 weeks. Patient expressed understanding of the plan - questions were answered.         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  Has not spoken to his one daughter in 4+ years.          Dylan Lindquist MD, [facility] [address], [address]      [id number] [address]  [address]  [facility], [address] [place] [address]  Ph: [phone]                                [phone]

## 2021-12-15 NOTE — PROGRESS NOTES
Chief Complaint   Patient presents with    Follow-up     1 month    CHF    Shortness of Breath     Visit Vitals  BP (!) 138/58 (BP 1 Location: Right upper arm, BP Patient Position: Sitting, BP Cuff Size: Large adult)   Pulse (!) 56   Ht 5' 9\" (1.753 m)   Wt 213 lb (96.6 kg)   SpO2 94%   BMI 31.45 kg/m²     Chest pain denied   SOB ANGELES not any better; sees pulmonology 12/29.    Palpitations denied   Swelling in hands/feet denied   Dizziness denied   Recent hospital stays denied   Refills denied

## 2022-01-18 RX ORDER — CLOPIDOGREL BISULFATE 75 MG/1
TABLET ORAL
Qty: 90 TABLET | Refills: 1 | Status: SHIPPED | OUTPATIENT
Start: 2022-01-18 | End: 2022-07-18

## 2022-01-18 NOTE — TELEPHONE ENCOUNTER
Refill per VO of Dr. Meli Washington  Last appt: 12/15/2021  Future Appointments   Date Time Provider Mal Ginny   2/2/2022 11:40 AM MD FIDEL Eubanks BS AMB   2/14/2022  8:00 AM Hernandez Tinajero MD [facility] BS AMB       Requested Prescriptions     Pending Prescriptions Disp Refills    clopidogreL (PLAVIX) 75 mg tab [Pharmacy Med Name: CLOPIDOGREL 75 MG TABLET] 90 Tablet 1     Sig: TAKE 1 TABLET BY MOUTH EVERY DAY

## 2022-02-14 ENCOUNTER — OFFICE VISIT (OUTPATIENT)
Dept: FAMILY MEDICINE CLINIC | Age: 82
End: 2022-02-14
Payer: MEDICARE

## 2022-02-14 VITALS
DIASTOLIC BLOOD PRESSURE: 80 MMHG | SYSTOLIC BLOOD PRESSURE: 130 MMHG | OXYGEN SATURATION: 95 % | BODY MASS INDEX: 31.76 KG/M2 | WEIGHT: 214.4 LBS | TEMPERATURE: 97.6 F | HEART RATE: 54 BPM | HEIGHT: 69 IN | RESPIRATION RATE: 20 BRPM

## 2022-02-14 DIAGNOSIS — I10 ESSENTIAL HYPERTENSION, BENIGN: ICD-10-CM

## 2022-02-14 DIAGNOSIS — Z00.00 MEDICARE ANNUAL WELLNESS VISIT, INITIAL: Primary | ICD-10-CM

## 2022-02-14 DIAGNOSIS — E11.21 TYPE 2 DIABETES WITH NEPHROPATHY (HCC): ICD-10-CM

## 2022-02-14 DIAGNOSIS — E78.2 MIXED HYPERLIPIDEMIA: ICD-10-CM

## 2022-02-14 DIAGNOSIS — I25.83 CORONARY ARTERY DISEASE DUE TO LIPID RICH PLAQUE: ICD-10-CM

## 2022-02-14 DIAGNOSIS — I25.10 CORONARY ARTERY DISEASE DUE TO LIPID RICH PLAQUE: ICD-10-CM

## 2022-02-14 LAB
ALBUMIN SERPL-MCNC: 4.2 G/DL (ref 3.5–5)
ALBUMIN/GLOB SERPL: 1.2 {RATIO} (ref 1.1–2.2)
ALP SERPL-CCNC: 86 U/L (ref 45–117)
ALT SERPL-CCNC: 22 U/L (ref 12–78)
ANION GAP SERPL CALC-SCNC: 4 MMOL/L (ref 5–15)
AST SERPL-CCNC: 17 U/L (ref 15–37)
BILIRUB SERPL-MCNC: 0.7 MG/DL (ref 0.2–1)
BNP SERPL-MCNC: 56 PG/ML
BUN SERPL-MCNC: 28 MG/DL (ref 6–20)
BUN/CREAT SERPL: 25 (ref 12–20)
CALCIUM SERPL-MCNC: 9.9 MG/DL (ref 8.5–10.1)
CHLORIDE SERPL-SCNC: 106 MMOL/L (ref 97–108)
CHOLEST SERPL-MCNC: 107 MG/DL
CO2 SERPL-SCNC: 28 MMOL/L (ref 21–32)
CREAT SERPL-MCNC: 1.14 MG/DL (ref 0.7–1.3)
GLOBULIN SER CALC-MCNC: 3.6 G/DL (ref 2–4)
GLUCOSE SERPL-MCNC: 161 MG/DL (ref 65–100)
HBA1C MFR BLD HPLC: 8.1 %
HDLC SERPL-MCNC: 39 MG/DL
HDLC SERPL: 2.7 {RATIO} (ref 0–5)
LDLC SERPL CALC-MCNC: 48.8 MG/DL (ref 0–100)
POTASSIUM SERPL-SCNC: 5.7 MMOL/L (ref 3.5–5.1)
PROT SERPL-MCNC: 7.8 G/DL (ref 6.4–8.2)
SODIUM SERPL-SCNC: 138 MMOL/L (ref 136–145)
TRIGL SERPL-MCNC: 96 MG/DL (ref ?–150)
VLDLC SERPL CALC-MCNC: 19.2 MG/DL

## 2022-02-14 PROCEDURE — 3052F HG A1C>EQUAL 8.0%<EQUAL 9.0%: CPT | Performed by: FAMILY MEDICINE

## 2022-02-14 PROCEDURE — G8510 SCR DEP NEG, NO PLAN REQD: HCPCS | Performed by: FAMILY MEDICINE

## 2022-02-14 PROCEDURE — G8536 NO DOC ELDER MAL SCRN: HCPCS | Performed by: FAMILY MEDICINE

## 2022-02-14 PROCEDURE — G8427 DOCREV CUR MEDS BY ELIG CLIN: HCPCS | Performed by: FAMILY MEDICINE

## 2022-02-14 PROCEDURE — G0439 PPPS, SUBSEQ VISIT: HCPCS | Performed by: FAMILY MEDICINE

## 2022-02-14 PROCEDURE — 83036 HEMOGLOBIN GLYCOSYLATED A1C: CPT | Performed by: FAMILY MEDICINE

## 2022-02-14 PROCEDURE — G8417 CALC BMI ABV UP PARAM F/U: HCPCS | Performed by: FAMILY MEDICINE

## 2022-02-14 PROCEDURE — 1101F PT FALLS ASSESS-DOCD LE1/YR: CPT | Performed by: FAMILY MEDICINE

## 2022-02-14 PROCEDURE — 99213 OFFICE O/P EST LOW 20 MIN: CPT | Performed by: FAMILY MEDICINE

## 2022-02-14 PROCEDURE — G8754 DIAS BP LESS 90: HCPCS | Performed by: FAMILY MEDICINE

## 2022-02-14 PROCEDURE — G0463 HOSPITAL OUTPT CLINIC VISIT: HCPCS | Performed by: FAMILY MEDICINE

## 2022-02-14 PROCEDURE — G8752 SYS BP LESS 140: HCPCS | Performed by: FAMILY MEDICINE

## 2022-02-14 RX ORDER — FLUTICASONE PROPIONATE AND SALMETEROL 250; 50 UG/1; UG/1
1 POWDER RESPIRATORY (INHALATION) EVERY 12 HOURS
COMMUNITY

## 2022-02-14 RX ORDER — ALBUTEROL SULFATE 1.25 MG/3ML
1.25 SOLUTION RESPIRATORY (INHALATION)
COMMUNITY

## 2022-02-14 NOTE — PROGRESS NOTES
This is the Subsequent Medicare Annual Wellness Exam, performed 12 months or more after the Initial AWV or the last Subsequent AWV    I have reviewed the patient's medical history in detail and updated the computerized patient record. Assessment/Plan   Education and counseling provided:  Are appropriate based on today's review and evaluation    1. Medicare annual wellness visit, initial  2. Type 2 diabetes with nephropathy ([address]  3. Essential hypertension, benign  4. Coronary artery disease due to lipid rich plaque  5. Mixed hyperlipidemia       Depression Risk Factor Screening     3 most recent PHQ Screens 2022   Little interest or pleasure in doing things Several days   Feeling down, depressed, irritable, or hopeless Several days   Total Score PHQ 2 2       Alcohol & Drug Abuse Risk Screen    Do you average more than 1 drink per night or more than 7 drinks a week: No    In the past three months have you have had more than 4 drinks containing alcohol on one occasion: No          Functional Ability and Level of Safety    Hearing: The patient wears hearing aids. Activities of Daily Living: The home contains: handrails  Patient does total self care      Ambulation: with no difficulty     Fall Risk:  Fall Risk Assessment, last 12 mths 2022   Able to walk? Yes   Fall in past 12 months? 0   Do you feel unsteady?  0   Are you worried about falling 0      Abuse Screen:  Patient is not abused       Cognitive Screening    Has your family/caregiver stated any concerns about your memory: no     Cognitive Screenin    Health Maintenance Due     Health Maintenance Due   Topic Date Due    Shingrix Vaccine Age 49> (1 of 2) Never done    Eye Exam Retinal or Dilated  11/15/2019    Flu Vaccine (1) 2021    Medicare Yearly Exam  10/21/2021       Patient Care Team   Patient Care Team:  Pedro Ames MD as PCP - [address] [place], [place] [address]Martha MD as PCP - [facility] Provider  Chace Rios, MD (Cardiology)    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    Colon cancer ([facility] C18.9    GEORGES (obstructive sleep apnea) G47.33    Mixed hyperlipidemia E78.2    Carotid stenosis I65.29    Encounter for long-term (current) use of other medications Z79.899    Diabetic retinopathy, background ([facility] E11.3299    Spondylolisthesis of lumbar region M43.16    DM (diabetes mellitus) type II controlled, neurological manifestation ([facility] E11.49    Essential hypertension, benign I10    Type 2 diabetes with nephropathy ([facility] E11.21    CAD (coronary artery disease) I25.10    Acute on chronic heart failure with preserved ejection fraction ([facility] I50.33     Past Medical History:   Diagnosis Date    Arthritis     CAD (coronary artery disease)     Colon cancer ([facility] 1997    Diabetes ([facility]     Diverticular disease of colon 4/5/2010    DM (diabetes mellitus) type II controlled, neurological manifestation ([facility] 12/7/2014    Encounter for long-term (current) use of other medications     Hypertension     Mixed hyperlipidemia 04/05/2010    pt reports medication was started due to history of DM    Obesity     GEORGES (obstructive sleep apnea) 4/5/2010    Unspecified sleep apnea     O2 AT NIGHT 2L, CANNOT USE CPAP MASK      Past Surgical History:   Procedure Laterality Date    HX KNEE ARTHROSCOPY Left 1971    HX KNEE ARTHROSCOPY Right 1991    HX LUMBAR LAMINECTOMY  2012    HX OTHER SURGICAL  1971    CIRCUMCISION, VASECTOMY    HX TOTAL COLECTOMY  6/1997    RESECTION    HX WISDOM TEETH EXTRACTION  1980     Current Outpatient Medications   Medication Sig Dispense Refill    fluticasone propion-salmeteroL (Wixela Inhub) 250-50 mcg/dose diskus inhaler Take 1 Puff by inhalation every twelve (12) hours.  albuterol (ACCUNEB) 1.25 mg/3 mL nebu 1.25 mg by Nebulization route every six (6) hours as needed for Wheezing.       clopidogreL (PLAVIX) 75 mg tab TAKE 1 TABLET BY MOUTH EVERY DAY 90 Tablet 1    dapagliflozin (Farxiga) 10 mg tab tablet Take 1 Tablet by mouth daily. 30 Tablet 12    spironolactone (ALDACTONE) 25 mg tablet Take 1 Tablet by mouth daily. 90 Tablet 3    bumetanide (BUMEX) 1 mg tablet Take 1 Tablet by mouth daily. 90 Tablet 3    amLODIPine (NORVASC) 10 mg tablet TAKE 1 TABLET BY MOUTH EVERY DAY 90 Tablet 4    atorvastatin (LIPITOR) 40 mg tablet TAKE 1 TABLET BY MOUTH EVERY DAY AT NIGHT 90 Tablet 3    quinapriL (ACCUPRIL) 10 mg tablet TAKE 1 TABLET DAILY 90 Tablet 3    metFORMIN (GLUCOPHAGE) 850 mg tablet Take 1 Tablet by mouth three (3) times daily. 270 Tablet 3    glipiZIDE (GLUCOTROL) 10 mg tablet TAKE 1 TABLET TWICE A  Tablet 3    Slow Fe 142 mg (45 mg iron) ER tablet TAKE 1 TABLET BY MOUTH TWICE A DAY      Oxygen-Air Delivery Systems 2 lpm at night 1 Each 0    aspirin 81 mg tablet Take 81 mg by mouth.  glucose blood VI test strips (OneTouch Ultra Test) strip Test Blood Sugar once daily Dx. Code E11.49 (Patient not taking: Reported on 2/14/2022) 150 Strip 3    glucose blood VI test strips (ASCENSIA AUTODISC VI, ONE TOUCH ULTRA TEST VI) strip by Does Not Apply route daily.  (Patient not taking: Reported on 11/17/2021) 35 Strip 11     Allergies   Allergen Reactions    Lortab [Hydrocodone-Acetaminophen] Other (comments)     Nightmares       Family History   Problem Relation Age of Onset    Stroke Mother     Heart Disease Father     Diabetes Father     Stroke Brother     Heart Disease Brother     Other Brother         AAA    Heart Disease Brother     Other Brother         AAA    Other Brother         anuerysms in legs and AAA, prediabetic    No Known Problems Daughter     No Known Problems Sister     Heart Disease Brother     Other Brother         AAA    Other Brother         Lyme disease?, prediabetic     Social History     Tobacco Use    Smoking status: Former Smoker     Packs/day: 1.50     Years: 25.00     Pack years: 37.50     Quit date: 1982     Years since quittin.0    Smokeless tobacco: Former User     Quit date: 2005   Substance Use Topics    Alcohol use:  Yes     Alcohol/week: 7.0 standard drinks     Types: 7 Glasses of wine per week         Mc Guzman MD

## 2022-02-14 NOTE — PROGRESS NOTES
Chief Complaint   Patient presents with   [facility] Annual Wellness Visit     medicare wellness     1. Have you been to the ER, urgent care clinic since your last visit? Hospitalized since your last visit?no    2. Have you seen or consulted any other health care providers outside of the [address] [place] [address] since your last visit? Include any pap smears or colon screening.  no

## 2022-02-14 NOTE — LETTER
2/14/2022    Mr. [facility] [id number]      Dear Shabana Llamas:    Please find your most recent results below. Resulted Orders   AMB POC HEMOGLOBIN A1C   Result Value Ref Range    Hemoglobin A1c (POC) 8.1 %   LIPID PANEL   Result Value Ref Range    Cholesterol, total 107 <200 MG/DL    Triglyceride 96 <150 MG/DL    HDL Cholesterol 39 MG/DL    LDL, calculated 48.8 0 - 100 MG/DL    VLDL, calculated 19.2 MG/DL    CHOL/HDL Ratio 2.7 0.0 - 5.0     METABOLIC PANEL, COMPREHENSIVE   Result Value Ref Range    Sodium 138 136 - 145 mmol/L    Potassium 5.7 (H) 3.5 - 5.1 mmol/L    Chloride 106 97 - 108 mmol/L    CO2 28 21 - 32 mmol/L    Anion gap 4 (L) 5 - 15 mmol/L    Glucose 161 (H) 65 - 100 mg/dL    BUN 28 (H) 6 - 20 MG/DL    Creatinine 1.14 0.70 - 1.30 MG/DL    BUN/Creatinine ratio 25 (H) 12 - 20      GFR est AA >60 >60 ml/min/1.73m2    GFR est non-AA >60 >60 ml/min/1.73m2    Calcium 9.9 8.5 - 10.1 MG/DL    Bilirubin, total 0.7 0.2 - 1.0 MG/DL    ALT (SGPT) 22 12 - 78 U/L    AST (SGOT) 17 15 - 37 U/L    Alk. phosphatase 86 45 - 117 U/L    Protein, total 7.8 6.4 - 8.2 g/dL    Albumin 4.2 3.5 - 5.0 g/dL    Globulin 3.6 2.0 - 4.0 g/dL    A-G Ratio 1.2 1.1 - 2.2       [id number] indicates your sugar control is fair. Please  Watch your diet more closely,and schedule more exercise on a regular basis,such as a brisk 30 minute walk 4 times per week. Please schedule an office visit in 3 months for follow up. Cholesterol/Lipid Panel were quite good! Nice Work! Your Potassium is quite  high and needs to be rechecked in 2days in the office. Please reduce the potassium in your diet till we recheck your level. Potassium rich foods to avoid are CITRUS FRUITS AND JUICES AND POTATOES. Also please stop any  Potassium Supplements till further notice. Please stop your Aldactone. Please call if you have any questions. RECOMMENDATIONS:  None. Keep up the good work!     Please call me if you have any questions: [phone]    Sincerely,      Angel Kebede MD

## 2022-02-14 NOTE — PROGRESS NOTES
HISTORY OF PRESENT ILLNESS  Zachary Wilde is a 80 y.o. male. f/u hbp,dm,chol,veronica. .BS stable,tolerating farxiga. Using O2 at hs  Diabetes  The history is provided by the patient. This is a chronic problem. The problem occurs daily. The problem has been gradually improving. Pertinent negatives include no headaches and no shortness of breath. Hypertension   The history is provided by the patient. This is a chronic problem. The problem has been gradually worsening. Pertinent negatives include no orthopnea, no palpitations, no malaise/fatigue, no blurred vision, no headaches, no peripheral edema and no shortness of breath. Cholesterol Problem  The history is provided by the patient. This is a chronic problem. The problem occurs daily. The problem has been gradually improving. Pertinent negatives include no headaches and no shortness of breath. Foot Pain  The history is provided by the patient. This is a chronic problem. The problem occurs daily. Pertinent negatives include no headaches and no shortness of breath. Shortness of Breath  The history is provided by the patient. This is a chronic problem. The problem has not changed since onset. Pertinent negatives include no fever, no headaches, no cough, no sputum production, no orthopnea and no rash. Review of Systems   Constitutional: Negative for fever and malaise/fatigue. HENT: Negative for hearing loss. Eyes: Negative for blurred vision and double vision. Respiratory: Positive for stridor. Negative for cough, sputum production and shortness of breath. Cardiovascular: Negative for palpitations and orthopnea. Gastrointestinal: Negative for heartburn. Genitourinary: Negative for dysuria. Musculoskeletal: Negative for joint pain and myalgias. Skin: Negative for rash. Neurological: Positive for tingling. Negative for headaches. Psychiatric/Behavioral: Negative for depression. The patient is not nervous/anxious.         Physical Exam  Constitutional:       Appearance: Normal appearance. He is well-developed and normal weight. HENT:      Head: Normocephalic and atraumatic. Right Ear: External ear normal.      Left Ear: External ear normal.      Nose: Nose normal.   Eyes:      Pupils: Pupils are equal, round, and reactive to light. Cardiovascular:      Rate and Rhythm: Normal rate and regular rhythm. Pulses: Normal pulses. Heart sounds: Normal heart sounds. Pulmonary:      Effort: Pulmonary effort is normal.      Breath sounds: Normal breath sounds. Abdominal:      General: Bowel sounds are normal.      Palpations: Abdomen is soft. Musculoskeletal:         General: Normal range of motion. Right shoulder: No tenderness or bony tenderness. Decreased strength. Cervical back: Normal range of motion and neck supple. Skin:     General: Skin is warm and dry. Findings: No erythema or rash. Comments: Comprehensive Diabetic Foot Exam  was performed     Neurological:      General: No focal deficit present. Mental Status: He is alert and oriented to person, place, and time. Psychiatric:         Mood and Affect: Mood normal.         Diagnoses and all orders for this visit:    1. Medicare annual wellness visit, initial    2. Type 2 diabetes with nephropathy (HCC)  -     AMB POC HEMOGLOBIN A1C    3. Essential hypertension, benign  -     METABOLIC PANEL, COMPREHENSIVE; Future    4. Coronary artery disease due to lipid rich plaque    5. Mixed hyperlipidemia  -     LIPID PANEL; Future      Follow-up and Dispositions    · Return in about 3 months (around 5/14/2022). Potassium level quite high ,advised low K+ diet,rtc 2 days    .     Doing well,continue current meds and treatments

## 2022-02-17 ENCOUNTER — OFFICE VISIT (OUTPATIENT)
Dept: FAMILY MEDICINE CLINIC | Age: 82
End: 2022-02-17

## 2022-02-17 DIAGNOSIS — E87.5 HYPERKALEMIA: Primary | ICD-10-CM

## 2022-02-17 DIAGNOSIS — I10 ESSENTIAL HYPERTENSION, BENIGN: ICD-10-CM

## 2022-02-18 LAB
ANION GAP SERPL CALC-SCNC: 5 MMOL/L (ref 5–15)
CHLORIDE SERPL-SCNC: 105 MMOL/L (ref 97–108)
CO2 SERPL-SCNC: 26 MMOL/L (ref 21–32)
POTASSIUM SERPL-SCNC: 5.3 MMOL/L (ref 3.5–5.1)
SODIUM SERPL-SCNC: 136 MMOL/L (ref 136–145)

## 2022-03-19 PROBLEM — I50.33 ACUTE ON CHRONIC HEART FAILURE WITH PRESERVED EJECTION FRACTION (HCC): Status: ACTIVE | Noted: 2021-11-17

## 2022-03-19 PROBLEM — E11.21 TYPE 2 DIABETES WITH NEPHROPATHY (HCC): Status: ACTIVE | Noted: 2018-09-12

## 2022-03-19 PROBLEM — I25.10 CAD (CORONARY ARTERY DISEASE): Status: ACTIVE | Noted: 2020-10-14

## 2022-06-14 ENCOUNTER — OFFICE VISIT (OUTPATIENT)
Dept: FAMILY MEDICINE CLINIC | Age: 82
End: 2022-06-14
Payer: MEDICARE

## 2022-06-14 VITALS
HEART RATE: 54 BPM | OXYGEN SATURATION: 96 % | HEIGHT: 69 IN | RESPIRATION RATE: 18 BRPM | SYSTOLIC BLOOD PRESSURE: 148 MMHG | BODY MASS INDEX: 31.19 KG/M2 | WEIGHT: 210.6 LBS | TEMPERATURE: 97.8 F | DIASTOLIC BLOOD PRESSURE: 72 MMHG

## 2022-06-14 DIAGNOSIS — E78.2 MIXED HYPERLIPIDEMIA: ICD-10-CM

## 2022-06-14 DIAGNOSIS — I10 ESSENTIAL HYPERTENSION, BENIGN: Primary | ICD-10-CM

## 2022-06-14 DIAGNOSIS — E11.21 TYPE 2 DIABETES WITH NEPHROPATHY (HCC): ICD-10-CM

## 2022-06-14 DIAGNOSIS — M54.2 CERVICALGIA: ICD-10-CM

## 2022-06-14 DIAGNOSIS — G47.33 OSA (OBSTRUCTIVE SLEEP APNEA): ICD-10-CM

## 2022-06-14 DIAGNOSIS — I25.10 CORONARY ARTERY DISEASE DUE TO LIPID RICH PLAQUE: ICD-10-CM

## 2022-06-14 DIAGNOSIS — G62.9 POLYNEUROPATHY: ICD-10-CM

## 2022-06-14 DIAGNOSIS — N52.01 ERECTILE DYSFUNCTION DUE TO ARTERIAL INSUFFICIENCY: ICD-10-CM

## 2022-06-14 DIAGNOSIS — I25.83 CORONARY ARTERY DISEASE DUE TO LIPID RICH PLAQUE: ICD-10-CM

## 2022-06-14 DIAGNOSIS — E11.42 CONTROLLED TYPE 2 DIABETES MELLITUS WITH DIABETIC POLYNEUROPATHY, WITHOUT LONG-TERM CURRENT USE OF INSULIN (HCC): ICD-10-CM

## 2022-06-14 LAB — HBA1C MFR BLD HPLC: 8.2 %

## 2022-06-14 PROCEDURE — G8417 CALC BMI ABV UP PARAM F/U: HCPCS | Performed by: FAMILY MEDICINE

## 2022-06-14 PROCEDURE — G8753 SYS BP > OR = 140: HCPCS | Performed by: FAMILY MEDICINE

## 2022-06-14 PROCEDURE — G8510 SCR DEP NEG, NO PLAN REQD: HCPCS | Performed by: FAMILY MEDICINE

## 2022-06-14 PROCEDURE — 1101F PT FALLS ASSESS-DOCD LE1/YR: CPT | Performed by: FAMILY MEDICINE

## 2022-06-14 PROCEDURE — G8427 DOCREV CUR MEDS BY ELIG CLIN: HCPCS | Performed by: FAMILY MEDICINE

## 2022-06-14 PROCEDURE — 1123F ACP DISCUSS/DSCN MKR DOCD: CPT | Performed by: FAMILY MEDICINE

## 2022-06-14 PROCEDURE — G0463 HOSPITAL OUTPT CLINIC VISIT: HCPCS | Performed by: FAMILY MEDICINE

## 2022-06-14 PROCEDURE — 99214 OFFICE O/P EST MOD 30 MIN: CPT | Performed by: FAMILY MEDICINE

## 2022-06-14 PROCEDURE — G8754 DIAS BP LESS 90: HCPCS | Performed by: FAMILY MEDICINE

## 2022-06-14 PROCEDURE — G8536 NO DOC ELDER MAL SCRN: HCPCS | Performed by: FAMILY MEDICINE

## 2022-06-14 PROCEDURE — 3052F HG A1C>EQUAL 8.0%<EQUAL 9.0%: CPT | Performed by: FAMILY MEDICINE

## 2022-06-14 PROCEDURE — 83036 HEMOGLOBIN GLYCOSYLATED A1C: CPT | Performed by: FAMILY MEDICINE

## 2022-06-14 RX ORDER — SILDENAFIL 25 MG/1
25 TABLET, FILM COATED ORAL
Qty: 5 TABLET | Refills: 5 | Status: SHIPPED | OUTPATIENT
Start: 2022-06-14

## 2022-06-14 RX ORDER — GLIPIZIDE 10 MG/1
TABLET ORAL
Qty: 180 TABLET | Refills: 3 | Status: SHIPPED | OUTPATIENT
Start: 2022-06-14

## 2022-06-14 RX ORDER — QUINAPRIL 10 MG/1
TABLET ORAL
Qty: 90 TABLET | Refills: 3 | Status: SHIPPED | OUTPATIENT
Start: 2022-06-14

## 2022-06-14 NOTE — PROGRESS NOTES
HISTORY OF PRESENT ILLNESS  Dennis Valdes is a 80 y.o. male. f/u hbp,dm,chol,veronica. Marnell Records M[facility] Records Using O2 at hs. Feeling well has new . Sprained l neck 1 week ago,slowly improving  Diabetes  The history is provided by the patient. This is a chronic problem. The problem occurs daily. The problem has been gradually improving. Hypertension   The history is provided by the patient. This is a chronic problem. The problem has been gradually worsening. Pertinent negatives include no palpitations, no malaise/fatigue, no blurred vision and no peripheral edema. Cholesterol Problem  The history is provided by the patient. This is a chronic problem. The problem occurs daily. The problem has been gradually improving. Neck Pain  The history is provided by the patient. This is a new problem. The current episode started more than 1 week ago. The problem occurs daily. Review of Systems   Constitutional: Negative for malaise/fatigue. HENT: Negative for hearing loss. Eyes: Negative for blurred vision and double vision. Respiratory: Positive for stridor. Cardiovascular: Negative for palpitations. Gastrointestinal: Negative for heartburn. Genitourinary: Negative for dysuria. Musculoskeletal: Negative for joint pain and myalgias. Neurological: Positive for tingling. Psychiatric/Behavioral: Negative for depression. The patient is not nervous/anxious. Physical Exam  Constitutional:       Appearance: Normal appearance. He is well-developed and normal weight. HENT:      Head: Normocephalic and atraumatic. Right Ear: External ear normal.      Left Ear: External ear normal.      Nose: Nose normal.   Eyes:      Pupils: Pupils are equal, round, and reactive to light. Cardiovascular:      Rate and Rhythm: Normal rate and regular rhythm. Pulses: Normal pulses. Heart sounds: Normal heart sounds. Pulmonary:      Effort: Pulmonary effort is normal.      Breath sounds: Normal breath sounds. Abdominal:      General: Bowel sounds are normal.      Palpations: Abdomen is soft. Musculoskeletal:      Right shoulder: No tenderness or bony tenderness. Decreased strength. Cervical back: Neck supple. Tenderness present. Pain with movement present. Decreased range of motion. Back:       Comments:  and DTRs normal and symmetrical in upper extremities     Skin:     General: Skin is warm and dry. Findings: No erythema or rash. Comments: Comprehensive Diabetic Foot Exam  was performed     Neurological:      General: No focal deficit present. Mental Status: He is alert and oriented to person, place, and time. Psychiatric:         Mood and Affect: Mood normal.         Diagnoses and all orders for this visit:    1. Essential hypertension, benign  -     METABOLIC PANEL, COMPREHENSIVE; Future  -     quinapriL (ACCUPRIL) 10 mg tablet; TAKE 1 TABLET DAILY    2. Type 2 diabetes with nephropathy (HCC)  -     AMB POC HEMOGLOBIN A1C  -     glipiZIDE (GLUCOTROL) 10 mg tablet; TAKE 1 TABLET TWICE A DAY    3. Coronary artery disease due to lipid rich plaque    4. Cervicalgia  -     XR SPINE CERV 4 OR 5 V; Future    5. Mixed hyperlipidemia  -     LIPID PANEL; Future    6. GEORGES (obstructive sleep apnea)    7. Polyneuropathy    8. Controlled type 2 diabetes mellitus with diabetic polyneuropathy, without long-term current use of insulin (HCC)  -     glipiZIDE (GLUCOTROL) 10 mg tablet; TAKE 1 TABLET TWICE A DAY    9. Erectile dysfunction due to arterial insufficiency  -     sildenafil citrate (VIAGRA) 25 mg tablet; Take 1 Tablet by mouth daily as needed for Erectile Dysfunction. Follow-up and Dispositions    · Return in about 3 months (around 9/14/2022).          .    Doing well,continue current meds and treatments

## 2022-06-14 NOTE — PROGRESS NOTES
Chief Complaint   Patient presents with    Diabetes     F/U on diabetes.  Hypertension     F/U on BP.  Cholesterol Problem     F/U on cholesterol.  Neck Pain     Pt state he has neck pain. 1. \"Have you been to the ER, urgent care clinic since your last visit? Hospitalized since your last visit? \" No    2. \"Have you seen or consulted any other health care providers outside of the [address] [place] [address] since your last visit? \" No     3. For patients aged 39-70: Has the patient had a colonoscopy / FIT/ Cologuard? NA - based on age      If the patient is female:    4. For patients aged 41-77: Has the patient had a mammogram within the past 2 years? NA - based on age or sex      11. For patients aged 21-65: Has the patient had a pap smear?  NA - based on age or sex    Health Maintenance Due   Topic Date Due    Shingrix Vaccine Age 49> (1 of 2) Never done    Foot Exam Q1  06/01/2022    MICROALBUMIN Q1  06/01/2022

## 2022-06-15 LAB
ALBUMIN SERPL-MCNC: 4.2 G/DL (ref 3.5–5)
ALBUMIN/GLOB SERPL: 1.2 {RATIO} (ref 1.1–2.2)
ALP SERPL-CCNC: 86 U/L (ref 45–117)
ALT SERPL-CCNC: 22 U/L (ref 12–78)
ANION GAP SERPL CALC-SCNC: 5 MMOL/L (ref 5–15)
AST SERPL-CCNC: 17 U/L (ref 15–37)
BILIRUB SERPL-MCNC: 0.7 MG/DL (ref 0.2–1)
BUN SERPL-MCNC: 24 MG/DL (ref 6–20)
BUN/CREAT SERPL: 21 (ref 12–20)
CALCIUM SERPL-MCNC: 9.7 MG/DL (ref 8.5–10.1)
CHLORIDE SERPL-SCNC: 104 MMOL/L (ref 97–108)
CHOLEST SERPL-MCNC: 113 MG/DL
CO2 SERPL-SCNC: 28 MMOL/L (ref 21–32)
CREAT SERPL-MCNC: 1.15 MG/DL (ref 0.7–1.3)
GLOBULIN SER CALC-MCNC: 3.6 G/DL (ref 2–4)
GLUCOSE SERPL-MCNC: 167 MG/DL (ref 65–100)
HDLC SERPL-MCNC: 37 MG/DL
HDLC SERPL: 3.1 {RATIO} (ref 0–5)
LDLC SERPL CALC-MCNC: 45.2 MG/DL (ref 0–100)
POTASSIUM SERPL-SCNC: 5.1 MMOL/L (ref 3.5–5.1)
PROT SERPL-MCNC: 7.8 G/DL (ref 6.4–8.2)
SODIUM SERPL-SCNC: 137 MMOL/L (ref 136–145)
TRIGL SERPL-MCNC: 154 MG/DL (ref ?–150)
VLDLC SERPL CALC-MCNC: 30.8 MG/DL

## 2022-07-18 ENCOUNTER — DOCUMENTATION ONLY (OUTPATIENT)
Dept: CARDIOLOGY CLINIC | Age: 82
End: 2022-07-18

## 2022-07-18 RX ORDER — CLOPIDOGREL BISULFATE 75 MG/1
TABLET ORAL
Qty: 30 TABLET | Refills: 1 | Status: SHIPPED | OUTPATIENT
Start: 2022-07-18 | End: 2022-08-30

## 2022-07-18 NOTE — TELEPHONE ENCOUNTER
Refill per VO of Dr. Marcy Mandel  Last appt: 12/15/21  Future Appointments   Date Time Provider Mal Gross   8/9/2022 10:40 AM MD FIDEL Boothe BS [facility]   10/14/2022  9:40 AM Jerzy Garcia MD [facility] BS [facility]       Requested Prescriptions     Signed Prescriptions Disp Refills    clopidogreL (PLAVIX) 75 mg tab 30 Tablet 1     Sig: TAKE 1 TABLET BY MOUTH EVERY DAY     Authorizing Provider: Lenard Worrell     Ordering User: Shana Beth     12/15/21 \"- Will plan to stop plavix soon, but first want to make sure cardiac status stable first   - Recommended checking an exercise cardiolite given recent ANGELES -- but patient wants to wait and consider the stress test later after he sees Pulmonary \"

## 2022-07-25 ENCOUNTER — DOCUMENTATION ONLY (OUTPATIENT)
Dept: CARDIOLOGY CLINIC | Age: 82
End: 2022-07-25

## 2022-08-10 RX ORDER — CLOPIDOGREL BISULFATE 75 MG/1
TABLET ORAL
Qty: 30 TABLET | Refills: 1 | OUTPATIENT
Start: 2022-08-10

## 2022-08-10 NOTE — TELEPHONE ENCOUNTER
Refill per VO of Dr. Angela Sanchez  Last appt: 2/2/2022  Future Appointments   Date Time Provider Mal Gross   8/30/2022 10:00 AM MD FIDEL Aguilar AMB       Requested Prescriptions     Refused Prescriptions Disp Refills    clopidogreL (PLAVIX) 75 mg tab [Pharmacy Med Name: CLOPIDOGREL 75 MG TABLET] 30 Tablet 1     Sig: TAKE 1 TABLET BY MOUTH EVERY DAY     Refused By: Brown Pock     Reason for Refusal: Patient has requested refill too soon

## 2022-08-30 ENCOUNTER — OFFICE VISIT (OUTPATIENT)
Dept: CARDIOLOGY CLINIC | Age: 82
End: 2022-08-30
Payer: MEDICARE

## 2022-08-30 VITALS
OXYGEN SATURATION: 97 % | HEIGHT: 69 IN | DIASTOLIC BLOOD PRESSURE: 62 MMHG | WEIGHT: 210 LBS | HEART RATE: 60 BPM | SYSTOLIC BLOOD PRESSURE: 130 MMHG | BODY MASS INDEX: 31.1 KG/M2

## 2022-08-30 DIAGNOSIS — E78.2 MIXED HYPERLIPIDEMIA: ICD-10-CM

## 2022-08-30 DIAGNOSIS — I50.33 ACUTE ON CHRONIC HEART FAILURE WITH PRESERVED EJECTION FRACTION (HCC): ICD-10-CM

## 2022-08-30 DIAGNOSIS — I25.118 CORONARY ARTERY DISEASE OF NATIVE HEART WITH STABLE ANGINA PECTORIS, UNSPECIFIED VESSEL OR LESION TYPE (HCC): Primary | ICD-10-CM

## 2022-08-30 DIAGNOSIS — I10 ESSENTIAL HYPERTENSION, BENIGN: ICD-10-CM

## 2022-08-30 DIAGNOSIS — R06.09 DOE (DYSPNEA ON EXERTION): ICD-10-CM

## 2022-08-30 PROCEDURE — G8427 DOCREV CUR MEDS BY ELIG CLIN: HCPCS | Performed by: SPECIALIST

## 2022-08-30 PROCEDURE — 99214 OFFICE O/P EST MOD 30 MIN: CPT | Performed by: SPECIALIST

## 2022-08-30 PROCEDURE — G0463 HOSPITAL OUTPT CLINIC VISIT: HCPCS | Performed by: SPECIALIST

## 2022-08-30 PROCEDURE — 1123F ACP DISCUSS/DSCN MKR DOCD: CPT | Performed by: SPECIALIST

## 2022-08-30 PROCEDURE — G8754 DIAS BP LESS 90: HCPCS | Performed by: SPECIALIST

## 2022-08-30 PROCEDURE — G8417 CALC BMI ABV UP PARAM F/U: HCPCS | Performed by: SPECIALIST

## 2022-08-30 PROCEDURE — G8536 NO DOC ELDER MAL SCRN: HCPCS | Performed by: SPECIALIST

## 2022-08-30 PROCEDURE — G8752 SYS BP LESS 140: HCPCS | Performed by: SPECIALIST

## 2022-08-30 PROCEDURE — 1101F PT FALLS ASSESS-DOCD LE1/YR: CPT | Performed by: SPECIALIST

## 2022-08-30 PROCEDURE — G8432 DEP SCR NOT DOC, RNG: HCPCS | Performed by: SPECIALIST

## 2022-08-30 RX ORDER — CLOPIDOGREL BISULFATE 75 MG/1
75 TABLET ORAL DAILY
Qty: 30 TABLET | Refills: 1 | Status: CANCELLED | OUTPATIENT
Start: 2022-08-30

## 2022-08-30 NOTE — PROGRESS NOTES
Orders for Exercise cardiolite when possible. See me in 6 months  per Dr. Moraima Garcia VO.   Dx: cad, huerta, chf

## 2022-08-30 NOTE — PROGRESS NOTES
Chief Complaint   Patient presents with    Follow-up     6 mo     Cholesterol Problem    Coronary Artery Disease    Hypertension     Visit Vitals  /62 (BP 1 Location: Left upper arm, BP Patient Position: Sitting)   Pulse 60   Ht 5' 9\" (1.753 m)   Wt 210 lb (95.3 kg)   SpO2 97%   BMI 31.01 kg/m²     Chest pain denied   SOB - yes. Palpitations denied   Swelling in hands/feet - Yes.    Dizziness denied   Recent hospital stays denied   Refills denied

## 2022-08-30 NOTE — PROGRESS NOTES
Alan Wilson MD. [facility]              Patient: Mary Hernandez  : 1940      Today's Date: 2022            HISTORY OF PRESENT ILLNESS:     History of Present Illness:  Was in  ED 10/4/21 with SOB. proBNP minimally elevated. Home O2 recommended for hypoxia. Still has SOB - class 2 ANGELES. Not any better lately. Still has LE edema. On 12/15/21- no change lately. Gets SOB splitting wood or climbing stairs --- but OK walking on level ground. He feels no different on Farxiga. No CP. No orthopnea. On 22 - continue with ANGELES -       PAST MEDICAL HISTORY:     Past Medical History:   Diagnosis Date    Arthritis     CAD (coronary artery disease)     Colon cancer ([facility]     Diabetes ([facility]     Diverticular disease of colon 2010    DM (diabetes mellitus) type II controlled, neurological manifestation ([facility] [facility] 2014    Encounter for long-term (current) use of other medications     Hypertension     Mixed hyperlipidemia 2010    pt reports medication was started due to history of DM    Obesity     GEORGES (obstructive sleep apnea) 2010    Unspecified sleep apnea     O2 AT NIGHT 2L, CANNOT USE CPAP MASK         Past Surgical History:   Procedure Laterality Date    HX KNEE ARTHROSCOPY Left     HX KNEE ARTHROSCOPY Right     HX LUMBAR LAMINECTOMY      HX OTHER SURGICAL  1971    CIRCUMCISION, VASECTOMY    HX TOTAL COLECTOMY  1997    RESECTION    HX WISDOM TEETH EXTRACTION               CURRENT MEDICATIONS:      Current Outpatient Medications   Medication Sig Dispense Refill    clopidogreL (PLAVIX) 75 mg tab TAKE 1 TABLET BY MOUTH EVERY DAY 30 Tablet 1    glipiZIDE (GLUCOTROL) 10 mg tablet TAKE 1 TABLET TWICE A  Tablet 3    quinapriL (ACCUPRIL) 10 mg tablet TAKE 1 TABLET DAILY 90 Tablet 3    sildenafil citrate (VIAGRA) 25 mg tablet Take 1 Tablet by mouth daily as needed for Erectile Dysfunction.  5 Tablet 5    fluticasone propion-salmeteroL (ADVAIR/WIXELA) 250-50 mcg/dose diskus inhaler Take 1 Puff by inhalation every twelve (12) hours. Indications: Pt state he uses 1 x daily. albuterol (ACCUNEB) 1.25 mg/3 mL nebu 1.25 mg by Nebulization route every six (6) hours as needed for Wheezing. dapagliflozin (Farxiga) 10 mg tab tablet Take 1 Tablet by mouth daily. 30 Tablet 12    bumetanide (BUMEX) 1 mg tablet Take 1 Tablet by mouth daily. 90 Tablet 3    amLODIPine (NORVASC) 10 mg tablet TAKE 1 TABLET BY MOUTH EVERY DAY 90 Tablet 4    atorvastatin (LIPITOR) 40 mg tablet TAKE 1 TABLET BY MOUTH EVERY DAY AT NIGHT 90 Tablet 3    metFORMIN (GLUCOPHAGE) 850 mg tablet Take 1 Tablet by mouth three (3) times daily. 270 Tablet 3    Slow Fe 142 mg (45 mg iron) ER tablet TAKE 1 TABLET BY MOUTH TWICE A DAY      Oxygen-Air Delivery Systems 2 lpm at night 1 Each 0    aspirin 81 mg tablet Take 81 mg by mouth. glucose blood VI test strips (OneTouch Ultra Test) strip Test Blood Sugar once daily Dx. Code E11.49 (Patient not taking: No sig reported) 150 Strip 3    glucose blood VI test strips (ASCENSIA AUTODISC VI, ONE TOUCH ULTRA TEST VI) strip by Does Not Apply route daily. (Patient not taking: No sig reported) 35 Strip 11     Allergies   Allergen Reactions    Lortab [Hydrocodone-Acetaminophen] Other (comments)     Nightmares             SOCIAL HISTORY:     Social History     Tobacco Use    Smoking status: Former     Packs/day: 1.50     Years: 25.00     Pack years: 37.50     Types: Cigarettes     Quit date: 1982     Years since quittin.6    Smokeless tobacco: Former     Quit date: 2005   Vaping Use    Vaping Use: Never used   Substance Use Topics    Alcohol use:  Yes     Alcohol/week: 7.0 standard drinks     Types: 7 Glasses of wine per week    Drug use: No           FAMILY HISTORY:     Family History   Problem Relation Age of Onset    Stroke Mother     Heart Disease Father     Diabetes Father     Stroke Brother     Heart Disease Brother     Other Brother         AAA Heart Disease Brother     Other Brother         AAA    Other Brother         anuerysms in legs and AAA, prediabetic    No Known Problems Daughter     No Known Problems Sister     Heart Disease Brother     Other Brother         AAA    Other Brother         Lyme disease?, prediabetic            REVIEW OF SYMPTOMS:      Review of Symptoms:  Constitutional: Negative for fever, chills  HEENT: Negative for nosebleeds, tinnitus, and vision changes. Respiratory: + occ wakes up coughing   Cardiovascular: Negative for  Syncope   Gastrointestinal: Negative for abdominal pain, diarrhea, melena. Genitourinary: Negative for dysuria  Musculoskeletal: Negative for myalgias. Skin: Negative for rash  Heme: No problems bleeding. Neurological: Negative for speech change and focal weakness. PHYSICAL EXAM:      Physical Exam:  Visit Vitals  /62 (BP 1 Location: Left upper arm, BP Patient Position: Sitting)   Pulse 60   Ht 5' 9\" (1.753 m)   Wt 210 lb (95.3 kg)   SpO2 97%   BMI 31.01 kg/m²          Patient appears generally well, mood and affect are appropriate and pleasant. HEENT:  Hearing intact, non-icteric, normocephalic, atraumatic. Neck Exam: Supple, No JVD   Lung Exam: Clear to auscultation, even breath sounds. Cardiac Exam: Regular rate and rhythm with no murmur or rub  Abdomen: Soft, non-tender, normal bowel sounds. Obese   Extremities: Moves all ext well. Mild bilat lower extremity edema.   Psych: Appropriate affect  Neuro - Grossly intact        LABS / OTHER STUDIES:         Lab Results   Component Value Date/Time    Sodium 137 06/14/2022 10:25 AM    Potassium 5.1 06/14/2022 10:25 AM    Chloride 104 06/14/2022 10:25 AM    CO2 28 06/14/2022 10:25 AM    Anion gap 5 06/14/2022 10:25 AM    Glucose 167 (H) 06/14/2022 10:25 AM    BUN 24 (H) 06/14/2022 10:25 AM    Creatinine 1.15 06/14/2022 10:25 AM    BUN/Creatinine ratio 21 (H) 06/14/2022 10:25 AM    GFR est AA >60 06/14/2022 10:25 AM    GFR est non-AA >60 06/14/2022 10:25 AM    Calcium 9.7 06/14/2022 10:25 AM    Bilirubin, total 0.7 06/14/2022 10:25 AM    Alk. phosphatase 86 06/14/2022 10:25 AM    Protein, total 7.8 06/14/2022 10:25 AM    Albumin 4.2 06/14/2022 10:25 AM    Globulin 3.6 06/14/2022 10:25 AM    A-G Ratio 1.2 06/14/2022 10:25 AM    ALT (SGPT) 22 06/14/2022 10:25 AM    AST (SGOT) 17 06/14/2022 10:25 AM       Lab Results   Component Value Date/Time    WBC 10.9 10/04/2021 05:28 AM    HGB 11.0 (L) 10/04/2021 05:28 AM    HCT 35.9 (L) 10/04/2021 05:28 AM    PLATELET 686 08/93/5844 05:28 AM    MCV 87.1 10/04/2021 05:28 AM       Lab Results   Component Value Date/Time    Cholesterol, total 113 06/14/2022 10:25 AM    HDL Cholesterol 37 06/14/2022 10:25 AM    LDL,Direct 119 (H) 07/06/2010 09:00 AM    LDL, calculated 45.2 06/14/2022 10:25 AM    VLDL, calculated 30.8 06/14/2022 10:25 AM    Triglyceride 154 (H) 06/14/2022 10:25 AM    CHOL/HDL Ratio 3.1 06/14/2022 10:25 AM                  CARDIAC DIAGNOSTICS:      Cardiac Evaluation Includes:     EKG 11/9/17 - NSR, 1st degree AVB, RBBB  EKG 6/29/20 - NSR, 1st degree AVB, RBBB        Went to ED with SOB on 3/14/20 --> treated for CHF based on symptoms   Labs 3/14/20 - trop neg, BMP OK, Hgb 10.5, proBNP 307  CXR 3/14/20 - vascular congestion   Echo 3/14/20 (W) - suboptimal study - unable to use Definity. LVEF 55%     Echo 8/14/20 - LVEF 60-65%, grade 3 diastology, AV calcifications, mod LAE     CT Heart Scan 10/7/20 - 1. CAC Score 2426.    2. Mild emphysema. 3. Mild segmental bronchiectasis and air trapping in the left lower lobe. 4. Possible cirrhosis. Spleen at the upper limits of normal in size. Cardiac Cath 10/14/20 - LCX 50%, IFFR 0.98. RCA 30%.   mLAD with sequential 30-70% lesions -----> iFFR mLAD 0.8 --> RUBÉN to mLED , PASP 50 mmHg, PCWP 14, LVEDP 10      Echo 10/13/21 - LVEF 60-65%, grade 2 diastology, mild LAE, mild RV enlargement            ASSESSMENT AND PLAN:      Assessment and Plan:     1) CAD  - RUBÉN to mLAD stenosis 10/14/20   - cont statin   - he denies CP but has ANGELES (class 2)   - Check an exercise caridolite given ANGELES  - Cont CAD meds (statin, CCB, ASA) - but stop Clopidogrel      2) HTN  - BP OK - cont meds      3)  HFpEF  - He says he had CHF treated in March 2020 at [facility] - says his LVEF was normal   - On 10/21 ---> Lately he has had more ANGELES - Seen in ED on 10/4/21 for SOB and hypoxia noted ---> Home O2 added then for SOB and hypoxia in the ED---> continue aldactone -- make bumex 1 mg daily - recheck an echo - consider Erin Riley later ---> asked him to see Dr. Rob Godfrey (he had prior problems with allergy to birds)   - On 11/17/21 - Still with ANGELES. Add Farxiga 10 mg daily. Cont Bumex and aldactone. Recheck labs in 3 weeks. - On 12/15/21 - still with ANGELES class 2 -- (no orthopnea, OK walking on level ground, SOB climbing stairs). No improvement with Erin Riley so he can stop if not affordable. He is to see Dr. Rob Godfrey soon. Also to check labs soon. He seems volume compensated. I suspect problems could be at least partially pulmonary in nature?    - proBNP dropped to 56 on 2/22  - Aldactone stopped for hyperkalemia (K 5.7)  -- may consider lower dose in future if needed. - On 8/30/22 - continues with class 2 ANGELES - check stress test above. Seems volume compensated. Cont Farxiga and Bumex. 4) Dyslipidemia  - prior lipids OK   - cont statin - follow labs      5) GEORGES - on CPAP and O2 at night      6) See me in 6 months. 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 Has not spoken to his one daughter in 4+ years. Gricelda Vila MD, [address]  [address] [facility], [address]      [place] [address]  [address]  [facility], [address] [place] [address]  Ph: [phone]                                [phone] ADDENDUM   9/13/2022    Exercise Cardiolite 9/13/22 - walked 5:31 (7 METS), No CP or ischemic EKG changes. Perfusion Comments: LV perfusion is probably normal. There is a low grade, fixed, apical defect without ischemia. LVEF 55%    Probably normal study -  Would consider a cardiac cath for worsening symptoms.

## 2022-09-13 ENCOUNTER — ANCILLARY PROCEDURE (OUTPATIENT)
Dept: CARDIOLOGY CLINIC | Age: 82
End: 2022-09-13
Payer: MEDICARE

## 2022-09-13 VITALS — BODY MASS INDEX: 31.1 KG/M2 | HEIGHT: 69 IN | WEIGHT: 210 LBS

## 2022-09-13 DIAGNOSIS — I50.33 ACUTE ON CHRONIC HEART FAILURE WITH PRESERVED EJECTION FRACTION (HCC): ICD-10-CM

## 2022-09-13 DIAGNOSIS — I25.118 CORONARY ARTERY DISEASE OF NATIVE HEART WITH STABLE ANGINA PECTORIS, UNSPECIFIED VESSEL OR LESION TYPE (HCC): ICD-10-CM

## 2022-09-13 DIAGNOSIS — R06.09 DOE (DYSPNEA ON EXERTION): ICD-10-CM

## 2022-09-13 LAB
NUC STRESS EJECTION FRACTION: 55 %
STRESS ANGINA INDEX: 0
STRESS BASELINE DIAS BP: 78 MMHG
STRESS BASELINE HR: 55 BPM
STRESS BASELINE SYS BP: 142 MMHG
STRESS ESTIMATED WORKLOAD: 7 METS
STRESS EXERCISE DUR MIN: 5 MIN
STRESS EXERCISE DUR SEC: 31 SEC
STRESS O2 SAT PEAK: 89 %
STRESS O2 SAT REST: 94 %
STRESS PEAK DIAS BP: 88 MMHG
STRESS PEAK SYS BP: 196 MMHG
STRESS PERCENT HR ACHIEVED: 80 %
STRESS POST PEAK HR: 110 BPM
STRESS RATE PRESSURE PRODUCT: NORMAL BPM*MMHG
STRESS SR DUKE TREADMILL SCORE: 6
STRESS ST DEPRESSION: 0 MM
STRESS TARGET HR: 138 BPM

## 2022-09-13 PROCEDURE — A9500 TC99M SESTAMIBI: HCPCS | Performed by: SPECIALIST

## 2022-09-13 PROCEDURE — 78452 HT MUSCLE IMAGE SPECT MULT: CPT | Performed by: SPECIALIST

## 2022-09-13 PROCEDURE — 93017 CV STRESS TEST TRACING ONLY: CPT | Performed by: SPECIALIST

## 2022-09-13 PROCEDURE — 93018 CV STRESS TEST I&R ONLY: CPT | Performed by: SPECIALIST

## 2022-09-13 PROCEDURE — 93016 CV STRESS TEST SUPVJ ONLY: CPT | Performed by: SPECIALIST

## 2022-09-13 RX ORDER — TETRAKIS(2-METHOXYISOBUTYLISOCYANIDE)COPPER(I) TETRAFLUOROBORATE 1 MG/ML
30 INJECTION, POWDER, LYOPHILIZED, FOR SOLUTION INTRAVENOUS ONCE
Status: COMPLETED | OUTPATIENT
Start: 2022-09-13 | End: 2022-09-13

## 2022-09-13 RX ORDER — TETRAKIS(2-METHOXYISOBUTYLISOCYANIDE)COPPER(I) TETRAFLUOROBORATE 1 MG/ML
10 INJECTION, POWDER, LYOPHILIZED, FOR SOLUTION INTRAVENOUS ONCE
Status: COMPLETED | OUTPATIENT
Start: 2022-09-13 | End: 2022-09-13

## 2022-09-13 RX ADMIN — TECHNETIUM TC 99M SESTAMIBI 25.7 MILLICURIE: 1 INJECTION, POWDER, FOR SOLUTION INTRAVENOUS at 10:45

## 2022-09-13 RX ADMIN — TECHNETIUM TC 99M SESTAMIBI 8.5 MILLICURIE: 1 INJECTION, POWDER, FOR SOLUTION INTRAVENOUS at 09:40

## 2022-09-14 ENCOUNTER — TELEPHONE (OUTPATIENT)
Dept: CARDIOLOGY CLINIC | Age: 82
End: 2022-09-14

## 2022-09-14 NOTE — PROGRESS NOTES
Can you please let him know that his stress test is likely overall normal.  If he has worsening symptoms, then could consider a cath. So just have him let us know if worsening ANGELES. Thanks!

## 2022-09-14 NOTE — TELEPHONE ENCOUNTER
Can you please let him know that his stress test is likely overall normal.  If he has worsening symptoms, then could consider a cath. So just have him let us know if worsening ANGELES. Thanks! Spoke with pt, Name KIA verified. Gave him above msg. He verbalized understanding. No follow up ques at this moment.

## 2022-10-07 RX ORDER — ATORVASTATIN CALCIUM 40 MG/1
TABLET, FILM COATED ORAL
Qty: 90 TABLET | Refills: 3 | Status: SHIPPED | OUTPATIENT
Start: 2022-10-07

## 2022-10-07 NOTE — TELEPHONE ENCOUNTER
Refill per VO of Dr. Wilder Rosas  Last appt: 8/30/22  Future Appointments   Date Time Provider Mal Gross   3/1/2023 10:00 AM MD FIDEL Fox BS AMB       Requested Prescriptions     Pending Prescriptions Disp Refills    atorvastatin (LIPITOR) 40 mg tablet [Pharmacy Med Name: ATORVASTATIN 40 MG TABLET] 90 Tablet 3     Sig: TAKE 1 TABLET BY MOUTH EVERY DAY AT NIGHT

## 2022-12-04 DIAGNOSIS — I10 ESSENTIAL HYPERTENSION, BENIGN: ICD-10-CM

## 2022-12-06 ENCOUNTER — TELEPHONE (OUTPATIENT)
Dept: CARDIOLOGY CLINIC | Age: 82
End: 2022-12-06

## 2022-12-06 DIAGNOSIS — I10 ESSENTIAL HYPERTENSION, BENIGN: ICD-10-CM

## 2022-12-06 RX ORDER — BUMETANIDE 1 MG/1
1 TABLET ORAL DAILY
Qty: 90 TABLET | Refills: 3 | Status: SHIPPED | OUTPATIENT
Start: 2022-12-06

## 2022-12-06 RX ORDER — BUMETANIDE 1 MG/1
TABLET ORAL
Qty: 90 TABLET | Refills: 3 | OUTPATIENT
Start: 2022-12-06

## 2022-12-06 RX ORDER — AMLODIPINE BESYLATE 10 MG/1
TABLET ORAL
Qty: 90 TABLET | Refills: 4 | OUTPATIENT
Start: 2022-12-06

## 2022-12-06 RX ORDER — ATORVASTATIN CALCIUM 40 MG/1
40 TABLET, FILM COATED ORAL
Qty: 90 TABLET | Refills: 3 | Status: SHIPPED | OUTPATIENT
Start: 2022-12-06

## 2022-12-06 RX ORDER — AMLODIPINE BESYLATE 10 MG/1
10 TABLET ORAL DAILY
Qty: 90 TABLET | Refills: 4 | Status: SHIPPED | OUTPATIENT
Start: 2022-12-06

## 2022-12-06 NOTE — TELEPHONE ENCOUNTER
Amlodipine to [facility]  Refill per VO of Dr. Hunter Dominique  Last appt: 8/30/2022  Future Appointments   Date Time Provider Mal Gross   3/1/2023 10:00 AM Claudeen Fridge, MD [facility] BS AMB       Requested Prescriptions     Signed Prescriptions Disp Refills    amLODIPine (NORVASC) 10 mg tablet 90 Tablet 4     Sig: Take 1 Tablet by mouth daily. Authorizing Provider: Parisa García     Ordering User: Bard Patten    bumetanide (BUMEX) 1 mg tablet 90 Tablet 3     Sig: Take 1 Tablet by mouth daily. Authorizing Provider: Parisa García     Ordering User: Bard Patten    atorvastatin (LIPITOR) 40 mg tablet 90 Tablet 3     Sig: Take 1 Tablet by mouth nightly.      Authorizing Provider: Parisa García     Ordering User: Bard Patten

## 2023-02-22 ENCOUNTER — OFFICE VISIT (OUTPATIENT)
Dept: FAMILY MEDICINE CLINIC | Age: 83
End: 2023-02-22
Payer: MEDICARE

## 2023-02-22 VITALS
SYSTOLIC BLOOD PRESSURE: 168 MMHG | DIASTOLIC BLOOD PRESSURE: 55 MMHG | BODY MASS INDEX: 31.31 KG/M2 | HEIGHT: 69 IN | OXYGEN SATURATION: 92 % | HEART RATE: 53 BPM | WEIGHT: 211.4 LBS

## 2023-02-22 DIAGNOSIS — I25.10 CORONARY ARTERY DISEASE DUE TO LIPID RICH PLAQUE: ICD-10-CM

## 2023-02-22 DIAGNOSIS — I10 ESSENTIAL HYPERTENSION, BENIGN: ICD-10-CM

## 2023-02-22 DIAGNOSIS — E11.21 TYPE 2 DIABETES WITH NEPHROPATHY (HCC): Primary | ICD-10-CM

## 2023-02-22 DIAGNOSIS — E11.9 DM (DIABETES MELLITUS) (HCC): ICD-10-CM

## 2023-02-22 DIAGNOSIS — G62.9 POLYNEUROPATHY: ICD-10-CM

## 2023-02-22 DIAGNOSIS — G47.33 OSA (OBSTRUCTIVE SLEEP APNEA): ICD-10-CM

## 2023-02-22 DIAGNOSIS — E11.42 TYPE 2 DIABETES MELLITUS WITH DIABETIC POLYNEUROPATHY, WITHOUT LONG-TERM CURRENT USE OF INSULIN (HCC): ICD-10-CM

## 2023-02-22 DIAGNOSIS — I25.83 CORONARY ARTERY DISEASE DUE TO LIPID RICH PLAQUE: ICD-10-CM

## 2023-02-22 DIAGNOSIS — E78.2 MIXED HYPERLIPIDEMIA: ICD-10-CM

## 2023-02-22 DIAGNOSIS — B35.4 TINEA CORPORIS: ICD-10-CM

## 2023-02-22 LAB — HBA1C MFR BLD HPLC: 9.2 %

## 2023-02-22 PROCEDURE — G0463 HOSPITAL OUTPT CLINIC VISIT: HCPCS | Performed by: FAMILY MEDICINE

## 2023-02-22 PROCEDURE — 3078F DIAST BP <80 MM HG: CPT | Performed by: FAMILY MEDICINE

## 2023-02-22 PROCEDURE — G8417 CALC BMI ABV UP PARAM F/U: HCPCS | Performed by: FAMILY MEDICINE

## 2023-02-22 PROCEDURE — 3046F HEMOGLOBIN A1C LEVEL >9.0%: CPT | Performed by: FAMILY MEDICINE

## 2023-02-22 PROCEDURE — 1101F PT FALLS ASSESS-DOCD LE1/YR: CPT | Performed by: FAMILY MEDICINE

## 2023-02-22 PROCEDURE — G8428 CUR MEDS NOT DOCUMENT: HCPCS | Performed by: FAMILY MEDICINE

## 2023-02-22 PROCEDURE — 1123F ACP DISCUSS/DSCN MKR DOCD: CPT | Performed by: FAMILY MEDICINE

## 2023-02-22 PROCEDURE — 83036 HEMOGLOBIN GLYCOSYLATED A1C: CPT | Performed by: FAMILY MEDICINE

## 2023-02-22 PROCEDURE — 99214 OFFICE O/P EST MOD 30 MIN: CPT | Performed by: FAMILY MEDICINE

## 2023-02-22 PROCEDURE — 3077F SYST BP >= 140 MM HG: CPT | Performed by: FAMILY MEDICINE

## 2023-02-22 PROCEDURE — G8536 NO DOC ELDER MAL SCRN: HCPCS | Performed by: FAMILY MEDICINE

## 2023-02-22 PROCEDURE — G8510 SCR DEP NEG, NO PLAN REQD: HCPCS | Performed by: FAMILY MEDICINE

## 2023-02-22 RX ORDER — METFORMIN HYDROCHLORIDE 850 MG/1
850 TABLET ORAL 2 TIMES DAILY WITH MEALS
Qty: 180 TABLET | Refills: 3 | Status: SHIPPED | OUTPATIENT
Start: 2023-02-22

## 2023-02-22 RX ORDER — LISINOPRIL 10 MG/1
10 TABLET ORAL DAILY
Qty: 90 TABLET | Refills: 3 | Status: SHIPPED | OUTPATIENT
Start: 2023-02-22

## 2023-02-22 RX ORDER — KETOCONAZOLE 20 MG/G
CREAM TOPICAL DAILY
Qty: 60 G | Refills: 2 | Status: SHIPPED | OUTPATIENT
Start: 2023-02-22

## 2023-02-22 NOTE — PROGRESS NOTES
HISTORY OF PRESENT ILLNESS  Chris Almodovar is a 80 y.o. male. f/u hbp,dm,chol,veronica. Catherin Mohs Catherin Mohs Using O2 at hs. Diabetes  The history is provided by the Patient. This is a chronic problem. The problem occurs daily. The problem has been gradually worsening. Hypertension   The history is provided by the Patient. This is a chronic problem. The problem has been gradually worsening. Pertinent negatives include no malaise/fatigue and no peripheral edema. Cholesterol Problem  The history is provided by the Patient. This is a chronic problem. The problem occurs daily. The problem has been gradually improving. Follow-up  The history is provided by the Patient. This is a chronic problem. The problem occurs daily. The problem has not changed since onset. Rash   The history is provided by the Patient. This is a new problem. The current episode started more than 1 week ago. The problem has been gradually improving. The rash is present on the abdomen. The pain is mild. The pain has been Fluctuating since onset. Associated symptoms include itching. Review of Systems   Constitutional:  Negative for malaise/fatigue. HENT:  Negative for hearing loss. Respiratory:  Positive for stridor. Skin:  Positive for itching and rash. Physical Exam  Constitutional:       Appearance: Normal appearance. He is well-developed and normal weight. HENT:      Head: Normocephalic and atraumatic. Right Ear: External ear normal.      Left Ear: External ear normal.      Nose: Nose normal.   Eyes:      Pupils: Pupils are equal, round, and reactive to light. Cardiovascular:      Rate and Rhythm: Regular rhythm. Pulses: Normal pulses. Heart sounds: Normal heart sounds. Pulmonary:      Effort: Pulmonary effort is normal.      Breath sounds: Normal breath sounds. Abdominal:      General: Bowel sounds are normal.      Palpations: Abdomen is soft. Musculoskeletal:      Right shoulder: No tenderness or bony tenderness.  Decreased strength. Cervical back: Neck supple. Tenderness present. Pain with movement present. Decreased range of motion. Back:       Comments:  and DTRs normal and symmetrical in upper extremities     Skin:     General: Skin is warm and dry. Findings: No erythema or rash. Comments: Comprehensive Diabetic Foot Exam  was performed     Countless macular pigmented plaques extensively over abdomen   Neurological:      General: No focal deficit present. Mental Status: He is alert and oriented to person, place, and time. Psychiatric:         Mood and Affect: Mood normal.       Diagnoses and all orders for this visit:    1. Type 2 diabetes with nephropathy (HCC)  -     AMB POC HEMOGLOBIN A1C  -     metFORMIN (GLUCOPHAGE) 850 mg tablet; Take 1 Tablet by mouth two (2) times daily (with meals). -     glucose blood VI test strips strip; by Does Not Apply route daily. 2. Essential hypertension, benign  -     METABOLIC PANEL, COMPREHENSIVE; Future  -     CBC WITH AUTOMATED DIFF; Future  -     lisinopriL (PRINIVIL, ZESTRIL) 10 mg tablet; Take 1 Tablet by mouth daily. 3. Coronary artery disease due to lipid rich plaque    4. Mixed hyperlipidemia  -     CHOLESTEROL, TOTAL; Future    5. Polyneuropathy    6. GEORGES (obstructive sleep apnea)    7. Type 2 diabetes mellitus with diabetic polyneuropathy, without long-term current use of insulin ([address]    8. DM (diabetes mellitus) ([address]    9. Tinea corporis  -     ketoconazole (NIZORAL) 2 % topical cream; Apply  to affected area daily. Follow-up and Dispositions    Return in about 3 months (around 5/22/2023).          .    Doing well,continue current meds and treatments

## 2023-02-22 NOTE — PROGRESS NOTES
Chief Complaint   Patient presents with    Follow-up     Patient is here medication refills      1. \"Have you been to the ER, urgent care clinic since your last visit? Hospitalized since your last visit? \" No    2. \"Have you seen or consulted any other health care providers outside of the [address] [place] [address] since your last visit? \"  No      3. For patients aged 39-70: Has the patient had a colonoscopy / FIT/ Cologuard? NA - based on age      If the patient is female:    4. For patients aged 41-77: Has the patient had a mammogram within the past 2 years? NA - based on age or sex      11. For patients aged 21-65: Has the patient had a pap smear?  NA - based on age or sex

## 2023-02-23 LAB
ALBUMIN SERPL-MCNC: 4.3 G/DL (ref 3.5–5)
ALBUMIN/GLOB SERPL: 1.2 (ref 1.1–2.2)
ALP SERPL-CCNC: 88 U/L (ref 45–117)
ALT SERPL-CCNC: 22 U/L (ref 12–78)
ANION GAP SERPL CALC-SCNC: 6 MMOL/L (ref 5–15)
AST SERPL-CCNC: 21 U/L (ref 15–37)
BASOPHILS # BLD: 0.1 K/UL (ref 0–0.1)
BASOPHILS NFR BLD: 1 % (ref 0–1)
BILIRUB SERPL-MCNC: 0.8 MG/DL (ref 0.2–1)
BUN SERPL-MCNC: 21 MG/DL (ref 6–20)
BUN/CREAT SERPL: 17 (ref 12–20)
CALCIUM SERPL-MCNC: 9.8 MG/DL (ref 8.5–10.1)
CHLORIDE SERPL-SCNC: 100 MMOL/L (ref 97–108)
CHOLEST SERPL-MCNC: 120 MG/DL
CO2 SERPL-SCNC: 31 MMOL/L (ref 21–32)
CREAT SERPL-MCNC: 1.25 MG/DL (ref 0.7–1.3)
DIFFERENTIAL METHOD BLD: ABNORMAL
EOSINOPHIL # BLD: 0.7 K/UL (ref 0–0.4)
EOSINOPHIL NFR BLD: 7 % (ref 0–7)
ERYTHROCYTE [DISTWIDTH] IN BLOOD BY AUTOMATED COUNT: 15.3 % (ref 11.5–14.5)
GLOBULIN SER CALC-MCNC: 3.5 G/DL (ref 2–4)
GLUCOSE SERPL-MCNC: 126 MG/DL (ref 65–100)
HCT VFR BLD AUTO: 46.4 % (ref 36.6–50.3)
HGB BLD-MCNC: 14.1 G/DL (ref 12.1–17)
IMM GRANULOCYTES # BLD AUTO: 0 K/UL (ref 0–0.04)
IMM GRANULOCYTES NFR BLD AUTO: 0 % (ref 0–0.5)
LYMPHOCYTES # BLD: 2.1 K/UL (ref 0.8–3.5)
LYMPHOCYTES NFR BLD: 21 % (ref 12–49)
MCH RBC QN AUTO: 27.2 PG (ref 26–34)
MCHC RBC AUTO-ENTMCNC: 30.4 G/DL (ref 30–36.5)
MCV RBC AUTO: 89.4 FL (ref 80–99)
MONOCYTES # BLD: 1 K/UL (ref 0–1)
MONOCYTES NFR BLD: 10 % (ref 5–13)
NEUTS SEG # BLD: 5.9 K/UL (ref 1.8–8)
NEUTS SEG NFR BLD: 61 % (ref 32–75)
NRBC # BLD: 0 K/UL (ref 0–0.01)
NRBC BLD-RTO: 0 PER 100 WBC
PLATELET # BLD AUTO: 207 K/UL (ref 150–400)
PMV BLD AUTO: 12 FL (ref 8.9–12.9)
POTASSIUM SERPL-SCNC: 3.8 MMOL/L (ref 3.5–5.1)
PROT SERPL-MCNC: 7.8 G/DL (ref 6.4–8.2)
RBC # BLD AUTO: 5.19 M/UL (ref 4.1–5.7)
SODIUM SERPL-SCNC: 137 MMOL/L (ref 136–145)
WBC # BLD AUTO: 9.9 K/UL (ref 4.1–11.1)

## 2023-03-06 NOTE — PROGRESS NOTES
Patient: Mabel Alvarez  : 1940    Primary Cardiologist: Jani Hidalgo MD. [facility]  Last Office Visit: 22      Today's Date: 3/9/2023        HISTORY OF PRESENT ILLNESS:     History of Present Illness: Today he presents for follow-up. He feels well overall. Say ANGELES has improved significantly and takes bumex daily. His EKG shows Aflutter today. He does not have a history of aflutter. He tells me that he drinks 3 diet pepsi per day, denies smoking, alcohol use. Walks 1.5 miles per day. He has a history of sleep apnea and uses O2 at night, however, he cannot keep his mask on due to moving around at night. Denies chest pain, edema, syncope, shortness of breath at rest.  Has no tachycardia, palpitations or sense of arrhythmia. Was in  ED 10/4/21 with SOB. proBNP minimally elevated. Home O2 recommended for hypoxia. Still has SOB - class 2 ANGELES. Not any better lately. Still has LE edema. On 12/15/21- no change lately. Gets SOB splitting wood or climbing stairs --- but OK walking on level ground. He feels no different on Farxiga. No CP. No orthopnea.       On 22 - continue with ANGELES -       PAST MEDICAL HISTORY:     Past Medical History:   Diagnosis Date    Arthritis     CAD (coronary artery disease)     Colon cancer ([facility] [address]     Diabetes ([facility] [address]     Diverticular disease of colon 2010    DM (diabetes mellitus) type II controlled, neurological manifestation ([address] 2014    Encounter for long-term (current) use of other medications     Hypertension     Mixed hyperlipidemia 2010    pt reports medication was started due to history of DM    Obesity     GEORGES (obstructive sleep apnea) 2010    Unspecified sleep apnea     O2 AT NIGHT 2L, CANNOT USE CPAP MASK         Past Surgical History:   Procedure Laterality Date    HX KNEE ARTHROSCOPY Left     HX KNEE ARTHROSCOPY Right     HX LUMBAR LAMINECTOMY      HX OTHER SURGICAL  1971    CIRCUMCISION, VASECTOMY    HX TOTAL COLECTOMY  1997    RESECTION    HX WISDOM TEETH EXTRACTION               CURRENT MEDICATIONS:      Current Outpatient Medications   Medication Sig Dispense Refill    metFORMIN (GLUCOPHAGE) 850 mg tablet Take 1 Tablet by mouth two (2) times daily (with meals). 180 Tablet 3    glucose blood VI test strips strip by Does Not Apply route daily. 100 Strip 3    lisinopriL (PRINIVIL, ZESTRIL) 10 mg tablet Take 1 Tablet by mouth daily. 90 Tablet 3    ketoconazole (NIZORAL) 2 % topical cream Apply  to affected area daily. 60 g 2    amLODIPine (NORVASC) 10 mg tablet Take 1 Tablet by mouth daily. 90 Tablet 4    bumetanide (BUMEX) 1 mg tablet Take 1 Tablet by mouth daily. 90 Tablet 3    atorvastatin (LIPITOR) 40 mg tablet Take 1 Tablet by mouth nightly. 90 Tablet 3    glipiZIDE (GLUCOTROL) 10 mg tablet TAKE 1 TABLET TWICE A  Tablet 3    dapagliflozin (Farxiga) 10 mg tab tablet Take 1 Tablet by mouth daily. 30 Tablet 12    glucose blood VI test strips (OneTouch Ultra Test) strip Test Blood Sugar once daily Dx. Code E11.49 150 Strip 3    Slow Fe 142 mg (45 mg iron) ER tablet TAKE 1 TABLET BY MOUTH TWICE A DAY      Oxygen-Air Delivery Systems 2 lpm at night 1 Each 0    aspirin 81 mg tablet Take 81 mg by mouth. Allergies   Allergen Reactions    Lortab [Hydrocodone-Acetaminophen] Other (comments)     Nightmares             SOCIAL HISTORY:     Social History     Tobacco Use    Smoking status: Former     Packs/day: 1.50     Years: 25.00     Pack years: 37.50     Types: Cigarettes     Quit date: 1982     Years since quittin.1    Smokeless tobacco: Former     Quit date: 2005   Vaping Use    Vaping Use: Never used   Substance Use Topics    Alcohol use:  Yes     Alcohol/week: 7.0 standard drinks     Types: 7 Glasses of wine per week    Drug use: No           FAMILY HISTORY:     Family History   Problem Relation Age of Onset    Stroke Mother     Heart Disease Father     Diabetes Father     Stroke Brother     Heart Disease Brother     Other Brother         AAA    Heart Disease Brother     Other Brother         AAA    Other Brother         anuerysms in legs and AAA, prediabetic    No Known Problems Daughter     No Known Problems Sister     Heart Disease Brother     Other Brother         AAA    Other Brother         Lyme disease?, prediabetic            REVIEW OF SYMPTOMS:      Review of Symptoms:  Constitutional: Negative for fever, chills  HEENT: Negative for nosebleeds, tinnitus, and vision changes. Respiratory: + occ wakes up coughing   Cardiovascular: Negative for  Syncope   Gastrointestinal: Negative for abdominal pain, diarrhea, melena. Genitourinary: Negative for dysuria  Musculoskeletal: Negative for myalgias. Skin: Negative for rash  Heme: No problems bleeding. Neurological: Negative for speech change and focal weakness. PHYSICAL EXAM:      Physical Exam:  Visit Vitals  /60 (BP 1 Location: Left upper arm, BP Patient Position: Sitting, BP Cuff Size: Adult)   Pulse (!) 54   Ht 5' 9\" (1.753 m)   Wt 214 lb (97.1 kg)   SpO2 93%   BMI 31.60 kg/m²            Patient appears generally well, mood and affect are appropriate and pleasant. HEENT:  Hearing intact, non-icteric, normocephalic, atraumatic. Neck Exam: Supple, No JVD   Lung Exam: Clear to auscultation, even breath sounds. Cardiac Exam: Regular rate and rhythm with no murmur or rub  Abdomen: Soft, non-tender, normal bowel sounds. Obese   Extremities: Moves all ext well. Mild bilat lower extremity edema.   Psych: Appropriate affect  Neuro - Grossly intact        LABS / OTHER STUDIES:         Lab Results   Component Value Date/Time    Sodium 137 02/22/2023 03:29 PM    Potassium 3.8 02/22/2023 03:29 PM    Chloride 100 02/22/2023 03:29 PM    CO2 31 02/22/2023 03:29 PM    Anion gap 6 02/22/2023 03:29 PM    Glucose 126 (H) 02/22/2023 03:29 PM    BUN 21 (H) 02/22/2023 03:29 PM    Creatinine 1.25 02/22/2023 03:29 PM    BUN/Creatinine ratio 17 02/22/2023 03:29 PM    GFR est AA >60 06/14/2022 10:25 AM    GFR est non-AA >60 06/14/2022 10:25 AM    Calcium 9.8 02/22/2023 03:29 PM    Bilirubin, total 0.8 02/22/2023 03:29 PM    Alk. phosphatase 88 02/22/2023 03:29 PM    Protein, total 7.8 02/22/2023 03:29 PM    Albumin 4.3 02/22/2023 03:29 PM    Globulin 3.5 02/22/2023 03:29 PM    A-G Ratio 1.2 02/22/2023 03:29 PM    ALT (SGPT) 22 02/22/2023 03:29 PM    AST (SGOT) 21 02/22/2023 03:29 PM       Lab Results   Component Value Date/Time    WBC 9.9 02/22/2023 03:29 PM    HGB 14.1 02/22/2023 03:29 PM    HCT 46.4 02/22/2023 03:29 PM    PLATELET 054 79/47/7689 03:29 PM    MCV 89.4 02/22/2023 03:29 PM       Lab Results   Component Value Date/Time    Cholesterol, total 120 02/22/2023 03:29 PM    HDL Cholesterol 37 06/14/2022 10:25 AM    LDL,Direct 119 (H) 07/06/2010 09:00 AM    LDL, calculated 45.2 06/14/2022 10:25 AM    VLDL, calculated 30.8 06/14/2022 10:25 AM    Triglyceride 154 (H) 06/14/2022 10:25 AM    CHOL/HDL Ratio 3.1 06/14/2022 10:25 AM       11/13/22 - , , HDL 41, LDL 72           CARDIAC DIAGNOSTICS:      Cardiac Evaluation Includes:     EKG 11/9/17 - NSR, 1st degree AVB, RBBB  EKG 6/29/20 - NSR, 1st degree AVB, RBBB        Went to ED with SOB on 3/14/20 --> treated for CHF based on symptoms   Labs 3/14/20 - trop neg, BMP OK, Hgb 10.5, proBNP 307  CXR 3/14/20 - vascular congestion   Echo 3/14/20 ([facility]) - suboptimal study - unable to use Definity. LVEF 55%     Echo 8/14/20 - LVEF 60-65%, grade 3 diastology, AV calcifications, mod LAE     CT Heart Scan 10/7/20 - 1. CAC Score 2426.    2. Mild emphysema. 3. Mild segmental bronchiectasis and air trapping in the left lower lobe. 4. Possible cirrhosis. Spleen at the upper limits of normal in size. Cardiac Cath 10/14/20 - LCX 50%, IFFR 0.98. RCA 30%.   mLAD with sequential 30-70% lesions -----> iFFR mLAD 0.8 --> RUBÉN to mLED , PASP 50 mmHg, PCWP 14, LVEDP 10      Echo 10/13/21 - LVEF 60-65%, grade 2 diastology, mild LAE, mild RV enlargement      Exercise Cardiolite 9/13/22 - walked 5:31 (7 METS), No CP or ischemic EKG changes. Perfusion Comments: LV perfusion is probably normal. There is a low grade, fixed, apical defect without ischemia. LVEF 55%       ASSESSMENT AND PLAN:      Assessment and Plan:   1) Aflutter  - found on EKG today  - denies symptoms   - does not require rate control, his HR is 48 on EKG  - will start eliquis 5 mg BID  - will discuss DCCV with Dr. Alex Davidson, EP referral ?    2) CAD  - RUBÉN to mLAD stenosis 10/14/20   - cont statin   - he denies CP but has ANGELES (class 2)   - Check an exercise caridolite given ANGELES  - Cont CAD meds (statin, CCB, ASA) - but stop Clopidogrel   - Stress test from 9/13/22 - Probably normal study -  Would consider a cardiac cath for worsening symptoms. 3) HTN  - BP OK - cont meds      4)  HFpEF  - He says he had CHF treated in March 2020 at [address] [place] [address] - says his LVEF was normal   - On 10/21 ---> Lately he has had more ANGELES - Seen in ED on 10/4/21 for SOB and hypoxia noted ---> Home O2 added then for SOB and hypoxia in the ED---> continue aldactone -- make bumex 1 mg daily - recheck an echo - consider Marilu Silverman later ---> asked him to see Dr. Flaco Dial (he had prior problems with allergy to birds)   - On 11/17/21 - Still with ANGELES. Add Farxiga 10 mg daily. Cont Bumex and aldactone. Recheck labs in 3 weeks. - On 12/15/21 - still with ANGELES class 2 -- (no orthopnea, OK walking on level ground, SOB climbing stairs). No improvement with Marilu Silverman so he can stop if not affordable. He is to see Dr. Flaco Dial soon. Also to check labs soon. He seems volume compensated. I suspect problems could be at least partially pulmonary in nature?    - proBNP dropped to 56 on 2/22  - Aldactone stopped for hyperkalemia (K 5.7)  -- may consider lower dose in future if needed. - On 8/30/22 - continues with class 2 ANGELES - check stress test above. Seems volume compensated.   Cont Marilu Silverman and Bumex. 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 Has not spoken to his one daughter in 4+ years. Jose Miguel Conteh, BRENNA    [address], [address]  [place] [address]  [address]  [address]  Ph: [phone]   Ph [phone]

## 2023-03-09 ENCOUNTER — OFFICE VISIT (OUTPATIENT)
Dept: CARDIOLOGY CLINIC | Age: 83
End: 2023-03-09
Payer: MEDICARE

## 2023-03-09 VITALS
HEIGHT: 69 IN | HEART RATE: 54 BPM | OXYGEN SATURATION: 93 % | BODY MASS INDEX: 31.7 KG/M2 | DIASTOLIC BLOOD PRESSURE: 60 MMHG | SYSTOLIC BLOOD PRESSURE: 138 MMHG | WEIGHT: 214 LBS

## 2023-03-09 DIAGNOSIS — I25.118 CORONARY ARTERY DISEASE OF NATIVE HEART WITH STABLE ANGINA PECTORIS, UNSPECIFIED VESSEL OR LESION TYPE (HCC): ICD-10-CM

## 2023-03-09 DIAGNOSIS — I50.33 ACUTE ON CHRONIC HEART FAILURE WITH PRESERVED EJECTION FRACTION (HCC): ICD-10-CM

## 2023-03-09 DIAGNOSIS — I10 ESSENTIAL HYPERTENSION, BENIGN: Primary | ICD-10-CM

## 2023-03-09 DIAGNOSIS — I48.3 TYPICAL ATRIAL FLUTTER (HCC): ICD-10-CM

## 2023-03-09 PROCEDURE — G0463 HOSPITAL OUTPT CLINIC VISIT: HCPCS

## 2023-03-09 PROCEDURE — 93005 ELECTROCARDIOGRAM TRACING: CPT

## 2023-03-09 NOTE — PROGRESS NOTES
Scott Estevez is a 80 y.o. male    Vitals:    03/09/23 0931   BP: 138/60   BP 1 Location: Left upper arm   BP Patient Position: Sitting   BP Cuff Size: Adult   Pulse: (!) 54   Height: 5' 9\" (1.753 m)   Weight: 214 lb (97.1 kg)   SpO2: 93%       Chief Complaint   Patient presents with    Coronary Artery Disease    Hypertension    Other     HFrEF       Chest pain NO  SOB NO  Dizziness NO  Swelling RIGHT FOOT  Recent hospital visit NO  Refills NO  COVID VACCINE YES  HAD COVID?  NO    NEEDS FINANCIAL ASSISTANCE FOR Jonathan Godinez

## 2023-03-09 NOTE — LETTER
3/9/2023    Patient: Kenny Corrales   YOB: 1940   Date of Visit: 3/9/2023     Renny Ferrell, [address] [facility]  [place]    Dear Renny Ferrell MD,      Thank you for referring Mr. Dontrell Drake to [facility] for evaluation. My notes for this consultation are attached. If you have questions, please do not hesitate to call me. I look forward to following your patient along with you.       Sincerely,    BRENT Salazar

## 2023-03-09 NOTE — ADDENDUM NOTE
Addended by: Lenin Rodriguez on: 3/9/2023 11:42 AM     Modules accepted: Orders R/O Acute coronary syndrome

## 2023-03-30 ENCOUNTER — OFFICE VISIT (OUTPATIENT)
Dept: CARDIOLOGY CLINIC | Age: 83
End: 2023-03-30
Payer: MEDICARE

## 2023-03-30 VITALS
BODY MASS INDEX: 31.28 KG/M2 | HEIGHT: 69 IN | DIASTOLIC BLOOD PRESSURE: 58 MMHG | RESPIRATION RATE: 16 BRPM | OXYGEN SATURATION: 95 % | WEIGHT: 211.2 LBS | HEART RATE: 53 BPM | SYSTOLIC BLOOD PRESSURE: 132 MMHG

## 2023-03-30 DIAGNOSIS — Z79.01 ANTICOAGULATED: ICD-10-CM

## 2023-03-30 DIAGNOSIS — I48.3 TYPICAL ATRIAL FLUTTER (HCC): Primary | ICD-10-CM

## 2023-03-30 DIAGNOSIS — I25.10 CORONARY ARTERY DISEASE INVOLVING NATIVE HEART WITHOUT ANGINA PECTORIS, UNSPECIFIED VESSEL OR LESION TYPE: ICD-10-CM

## 2023-03-30 DIAGNOSIS — I50.33 ACUTE ON CHRONIC HEART FAILURE WITH PRESERVED EJECTION FRACTION (HCC): ICD-10-CM

## 2023-03-30 DIAGNOSIS — I10 ESSENTIAL HYPERTENSION, BENIGN: ICD-10-CM

## 2023-03-30 DIAGNOSIS — E78.2 MIXED HYPERLIPIDEMIA: ICD-10-CM

## 2023-03-30 PROCEDURE — 93005 ELECTROCARDIOGRAM TRACING: CPT | Performed by: INTERNAL MEDICINE

## 2023-03-30 PROCEDURE — G0463 HOSPITAL OUTPT CLINIC VISIT: HCPCS | Performed by: INTERNAL MEDICINE

## 2023-03-30 RX ORDER — QUINAPRIL 10 MG/1
TABLET ORAL
COMMUNITY

## 2023-03-30 NOTE — PROGRESS NOTES
Cardiac Electrophysiology OFFICE Consultation Note     Primary Cardiologist: Nito Rider    Assessment/Plan:   1. Typical atrial flutter (HCC)  -     AMB POC EKG ROUTINE W/ 12 LEADS, INTER & REP  2. Acute on chronic heart failure with preserved ejection fraction (HCC)  -     AMB POC EKG ROUTINE W/ 12 LEADS, INTER & REP  3. Coronary artery disease involving native heart without angina pectoris, unspecified vessel or lesion type  -     AMB POC EKG ROUTINE W/ 12 LEADS, INTER & REP  4. Mixed hyperlipidemia  -     AMB POC EKG ROUTINE W/ 12 LEADS, INTER & REP  5. Essential hypertension, benign  -     AMB POC EKG ROUTINE W/ 12 LEADS, INTER & REP  6. Anticoagulated  -     AMB POC EKG ROUTINE W/ 12 LEADS, INTER & REP  7. BMI 31.0-31.9,adult  -     AMB POC EKG ROUTINE W/ 12 LEADS, INTER & REP     Persistent atrial flutter  Mr Leyla Magana is an 80year old male with atrial flutter. He is very active however aware of dyspnea on exertion. I independently reviewed multiple EKGs from today's clinic as well as prior clinics which demonstrated negative flutter waves in the inferior lead consistent with typical counterclockwise atrial flutter. - Spoke to him regarding the curative nature of this rhythm through ablation therapy  - The implication regarding the diagnosis of AFL was explained to the patient in great detail including the associated risk of CVA, AF-mediated cardiomyopathy, and progression into persistent/chronic AF.  - I have discussed in detail the indications, alternatives, benefits and risks of an electrophysiologic study and radiofrequency ablation for atrial flutter. The risks that were discussed included but not limited to bleeding, pericarditis, damage to the electrical conduction system potentially requiring a pacemaker, cardiac tamponade, ventricular arrhythmias, stroke, MI, and death. Patient reports complete understanding of discussions and recommendations and wish to proceed with the procedure.   - We will schedule for atypical atrial flutter ablation with MAC anesthesia at [facility] next available  - No need to hold blood thinner medication    Anticoagulation  Denies of any bleeding issues. Know to contact clinic with any bleeding side effects (BRBPR, melena, hemotysis, hematuria). - Continue Eliquis for thromboembolic prophylaxis    Chronic congestive heart failure  Last echocardiogram demonstrated preserved LVEF      Subjective:       Lorna Smith is a 80 y.o. patient who is seen for evaluation of  typical atrial flutter. The patient denies chest pain,orthopnea, PND, LE edema, palpitations, syncope, presyncope or fatigue. He remains active. Uses [employer] when he watches [employer]. Tracks hr while he exercises with highest hr up to 120. No limiting sx while exercising. 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 This has been recurring since 2019, when diagnosed 2019. Lorna Smith  is on [facility], reports no melena, hematuria, or obvious signs of bleeding. No falls. Dx with DM when he was 61. Has been exercising regularly. Mother with CVA in 1,  in 80s. Father passed in 76s with CHF. I independently review internal and external records of most recent labs, EKGs, event monitors or Holters, and imaging including most recent echocardiograms and stress test.     Echo  with EF 60-65%, LA: Mildly dilated left atrium. Left Atrium volume index is 38.68 mL/m2. Nuclear stress test  : LV perfusion is probably normal. There is a low grade, fixed, apical defect without ischemia. Left heart cath 10/14/20 with single vessel CAD, s/p 3.90x23 Xience RUBÉN to LAD.      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 dapagliflozin (Farxiga) 10 mg tab tablet Take 1 Tablet by mouth daily. 90 Tablet 3    apixaban (ELIQUIS) 5 mg tablet Take 1 Tablet by mouth two (2) times a day. 56 Tablet 0    metFORMIN (GLUCOPHAGE) 850 mg tablet Take 1 Tablet by mouth two (2) times daily (with meals). 180 Tablet 3    glucose blood VI test strips strip by Does Not Apply route daily. 100 Strip 3    lisinopriL (PRINIVIL, ZESTRIL) 10 mg tablet Take 1 Tablet by mouth daily. 90 Tablet 3    ketoconazole (NIZORAL) 2 % topical cream Apply  to affected area daily. 60 g 2    amLODIPine (NORVASC) 10 mg tablet Take 1 Tablet by mouth daily. 90 Tablet 4    bumetanide (BUMEX) 1 mg tablet Take 1 Tablet by mouth daily. 90 Tablet 3    atorvastatin (LIPITOR) 40 mg tablet Take 1 Tablet by mouth nightly. 90 Tablet 3    glipiZIDE (GLUCOTROL) 10 mg tablet TAKE 1 TABLET TWICE A  Tablet 3    glucose blood VI test strips (OneTouch Ultra Test) strip Test Blood Sugar once daily Dx. Code E11.49 150 Strip 3    Slow Fe 142 mg (45 mg iron) ER tablet TAKE 1 TABLET BY MOUTH TWICE A DAY      Oxygen-Air Delivery Systems 2 lpm at night 1 Each 0    aspirin 81 mg tablet Take 81 mg by mouth. Allergies   Allergen Reactions    Hydrocodone-Acetaminophen Other (comments)     Nightmares  Other reaction(s):  Other (comments)  Nightmares     Past Medical History:   Diagnosis Date    Arthritis     CAD (coronary artery disease)     Colon cancer ([facility]     Diabetes ([facility]     Diverticular disease of colon 2010    DM (diabetes mellitus) type II controlled, neurological manifestation ([facility] 2014    Encounter for long-term (current) use of other medications     Hypertension     Mixed hyperlipidemia 2010    pt reports medication was started due to history of DM    Obesity     GEORGES (obstructive sleep apnea) 2010    Unspecified sleep apnea     O2 AT NIGHT 2L, CANNOT USE CPAP MASK     Past Surgical History:   Procedure Laterality Date    HX KNEE ARTHROSCOPY Left     HX KNEE ARTHROSCOPY Right     HX LUMBAR LAMINECTOMY      HX OTHER SURGICAL  1971    CIRCUMCISION, VASECTOMY    HX TOTAL COLECTOMY  1997    RESECTION    HX WISDOM TEETH EXTRACTION       Family History   Problem Relation Age of Onset    Stroke Mother     Heart Disease Father     Diabetes Father     Stroke Brother     Heart Disease Brother     Other Brother         AAA    Heart Disease Brother     Other Brother         AAA    Other Brother         anuerysms in legs and AAA, prediabetic    No Known Problems Daughter     No Known Problems Sister     Heart Disease Brother     Other Brother         AAA    Other Brother         Lyme disease?, prediabetic     Social History     Tobacco Use    Smoking status: Former     Packs/day: 1.50     Years: 25.00     Pack years: 37.50     Types: Cigarettes     Quit date: 1982     Years since quittin.1    Smokeless tobacco: Former     Quit date: 2005   Substance Use Topics    Alcohol use: Not Currently     Alcohol/week: 7.0 standard drinks     Types: 7 Glasses of wine per week        Review of Systems:   12 point review of systems was performed.  All negative except for HPI     Objective:   BP (!) 132/58 (BP 1 Location: Left upper arm, BP Patient Position: Sitting, BP Cuff Size: Adult)   Pulse (!) 53   Resp 16   Ht 5' 9\" (1.753 m)   Wt 211 lb 3.2 oz (95.8 kg)   SpO2 95%   BMI 31.19 kg/m²     Physical Exam: General:  Alert and oriented, in no acute distress  Head:  Atraumatic, normocephalic  Eyes:  extraocular muscles intact  Neck:  Supple, normal range of motion  Lungs:  Clear to auscultation bilaterally, no wheezes/rales/rhonchi   Cardiovascular:  Regular rate and rhythm, normal S1-S2, no murmurs/rubs/gallops  Abdomen:  Soft, nontender, nondistended, normoactive bowel sounds  Skin:  Intact, no rash  Extremities:, no clubbing, cyanosis, or edema  Musculoskeletal: normal range of motion  Neurological:  Alert and oriented, no focal neurologic deficits  Psychiatric:  Normal mood and affect    Lab Results   Component Value Date/Time    Hemoglobin A1c 10.2 (H) 06/01/2021 10:38 AM    Hemoglobin A1c (POC) 9.2 02/22/2023 03:15 PM     EKG: Typical atrial flutter, rate 56 bpm, right bundle branch block  10/13/21    ECHO ADULT COMPLETE 10/13/2021 10/13/2021    Interpretation Summary  · LV: Estimated LVEF is 60 - 65%. Normal cavity size and systolic function (ejection fraction normal). Upper normal wall thickness. Wall motion: normal. Moderate (grade 2) left ventricular diastolic dysfunction. · LA: Mildly dilated left atrium. Left Atrium volume index is 38.68 mL/m2. · RV: Mildly dilated right ventricle. · RA: Mildly dilated right atrium. · AV sclerosis    Signed by: Cash Sharp MD on 10/13/2021  5:26 PM    10/14/20    CARDIAC PROCEDURE 10/14/2020, 10/14/2020 10/20/2020    Conclusion  · Severe single vessel CAD  · Moderate pulmonary HTN  · Normal left ventricular systolic function    Mr. Betsy Cordoba is an [de-identified] yo WM with progressive shortness of breath. Moderate pulmonary HTN on RHC. Single vessel CAD with normal LV systolic function on LHC. iFFR mid LAD 0.8  Pre-dilatation with 2.5 x 15 balloon  PTCA and stent with 3.0 x 23 Xience RUBÉN  Post-dilatation with 3.0 x 20 NC balloon to 16 elisa  iFFR 1.0 post intervention  No vessel damage. LIZA 3 flow pre and post intervention. Stenosis 70% to 0%.     Angioseal closure right common femoral artery. Manual pressure to right common femoral vein  Addendum by: Laura Pineda MD on 10/20/2020  5:01 PM    Signed by: Charissa Pathak MD on 10/14/2020  5:42 PM, Signed by: Bindu Barrett MD on 10/14/2020  6:27 PM    09/13/22    NUCLEAR CARDIAC STRESS TEST 09/13/2022 9/20/2022    Interpretation Summary    ECG: Resting ECG demonstrates atrial flutter and right bundle branch block. Stress Test: A Zaid protocol stress test was performed. Overall, the patient's exercise capacity was average for their age. The patient reached stage 2 of the protocol after exercising for 5 min and 31 sec. Hemodynamics are adequate for diagnosis. Blood pressure demonstrated a normal responseat rest and a hypertensive response to stress. The patient's heart rate recovery was normal. The patient reported dyspnea and no chest pain during the stress test.    ECG: Stress ECG was negative for ischemia. Perfusion Comments: LV perfusion is probably normal. There is a low grade, fixed, apical defect without ischemia. Stress Function: Left ventricular function post-stress is normal. Post-stress ejection fraction is 55%.  The stress end diastolic cavity size is normal.    Signed by: Bindu Barrett MD on 9/13/2022  7:18 PM    Results for orders placed or performed during the hospital encounter of 10/04/21   EKG, 12 LEAD, INITIAL   Result Value Ref Range    Ventricular Rate 57 BPM    Atrial Rate 250 BPM    QRS Duration 138 ms    Q-T Interval 474 ms    QTC Calculation (Bezet) 461 ms    Calculated P Axis 62 degrees    Calculated R Axis 92 degrees    Calculated T Axis 3 degrees    Diagnosis       Atrial flutter with variable AV block  Right bundle branch block  Septal infarct (cited on or before 04-OCT-2021)  Abnormal ECG  When compared with ECG of 14-OCT-2020 18:22,  Atrial flutter has replaced Sinus rhythm  Confirmed by Brad Perez MD., Robyne Dubin ([id number]) on 10/4/2021 5:27:42 PM               Thank you for involving me in this patient's care and please call with further concerns or questions. ________________________________________  An Neena Vergara MD, [facility]  Cardiac Electrophysiology  [facility] [place]  [address] [place] [address] Avenue                             [phone]     [address] [place] [address].  [address] [place] [address], [address] [facility]  [phone]

## 2023-03-30 NOTE — PATIENT INSTRUCTIONS
Scheduled for typical atrial flutter ablation with MAC anesthesia at [facility]   Follow-up with Dr. Tashi Drummond 1 month post ablation    Your EP study and typical Aflutter ablation procedure has been scheduled for 5/15/23 at 1230 pm, at [facility].    Please report to Admitting Department by 1030 am, or 2 hours prior to your scheduled procedure. Please bring a list of your current medications and medication bottles, if able, to the hospital on this day. You will be unable to drive after your procedure so please make sure to bring someone with you to your procedure. You will need to have nothing to eat or drink after midnight, the night prior to your procedure. You may have small sips of water, if needed, to take with your medication. You will also need to see Dr. Anya Paredes Nurse Practitioner, Svetlana Phillips, in office prior to your procedure. An appointment has been scheduled for 4/27/23 at 940 am.    You will need labs drawn prior to your procedure. Please go to have this done after your appointment with Cathalene Oppenheim NP. You should NOT stop your medications prior to your scheduled procedure. After your procedure, you will need to follow up with Dr. Tashi Drummond. Your follow-up appointment has been scheduled for 6/22/23 at 1040.

## 2023-04-21 PROBLEM — Z79.01 ANTICOAGULATED: Status: ACTIVE | Noted: 2023-04-21

## 2023-04-21 PROBLEM — I48.3 TYPICAL ATRIAL FLUTTER (HCC): Status: ACTIVE | Noted: 2023-04-21

## 2023-04-25 ENCOUNTER — TELEPHONE (OUTPATIENT)
Dept: CARDIOLOGY CLINIC | Age: 83
End: 2023-04-25

## 2023-04-25 ENCOUNTER — OFFICE VISIT (OUTPATIENT)
Dept: CARDIOLOGY CLINIC | Age: 83
End: 2023-04-25
Payer: MEDICARE

## 2023-04-25 ENCOUNTER — ANCILLARY PROCEDURE (OUTPATIENT)
Dept: CARDIOLOGY CLINIC | Age: 83
End: 2023-04-25
Payer: MEDICARE

## 2023-04-25 VITALS
BODY MASS INDEX: 31.4 KG/M2 | WEIGHT: 212 LBS | OXYGEN SATURATION: 96 % | SYSTOLIC BLOOD PRESSURE: 138 MMHG | RESPIRATION RATE: 18 BRPM | HEART RATE: 52 BPM | HEIGHT: 69 IN | DIASTOLIC BLOOD PRESSURE: 62 MMHG

## 2023-04-25 VITALS
DIASTOLIC BLOOD PRESSURE: 62 MMHG | HEIGHT: 69 IN | WEIGHT: 212 LBS | BODY MASS INDEX: 31.4 KG/M2 | SYSTOLIC BLOOD PRESSURE: 138 MMHG

## 2023-04-25 DIAGNOSIS — I50.32 CHRONIC HEART FAILURE WITH PRESERVED EJECTION FRACTION (HCC): ICD-10-CM

## 2023-04-25 DIAGNOSIS — Z79.01 ANTICOAGULATED: ICD-10-CM

## 2023-04-25 DIAGNOSIS — I10 ESSENTIAL HYPERTENSION, BENIGN: ICD-10-CM

## 2023-04-25 DIAGNOSIS — R06.09 DOE (DYSPNEA ON EXERTION): ICD-10-CM

## 2023-04-25 DIAGNOSIS — I48.3 TYPICAL ATRIAL FLUTTER (HCC): Primary | ICD-10-CM

## 2023-04-25 DIAGNOSIS — I25.10 CORONARY ARTERY DISEASE INVOLVING NATIVE CORONARY ARTERY OF NATIVE HEART WITHOUT ANGINA PECTORIS: ICD-10-CM

## 2023-04-25 DIAGNOSIS — I48.3 TYPICAL ATRIAL FLUTTER (HCC): ICD-10-CM

## 2023-04-25 LAB
ECHO AO ROOT DIAM: 3.5 CM
ECHO AO ROOT INDEX: 1.65 CM/M2
ECHO AV AREA PEAK VELOCITY: 3.2 CM2
ECHO AV AREA VTI: 2.8 CM2
ECHO AV AREA/BSA PEAK VELOCITY: 1.5 CM2/M2
ECHO AV AREA/BSA VTI: 1.3 CM2/M2
ECHO AV MEAN GRADIENT: 3 MMHG
ECHO AV MEAN VELOCITY: 0.9 M/S
ECHO AV PEAK GRADIENT: 5 MMHG
ECHO AV PEAK VELOCITY: 1.2 M/S
ECHO AV VELOCITY RATIO: 0.92
ECHO AV VTI: 29.2 CM
ECHO LA DIAMETER INDEX: 2.17 CM/M2
ECHO LA DIAMETER: 4.6 CM
ECHO LA TO AORTIC ROOT RATIO: 1.31
ECHO LA VOL 2C: 69 ML (ref 18–58)
ECHO LA VOL 4C: 76 ML (ref 18–58)
ECHO LA VOL BP: 80 ML (ref 18–58)
ECHO LA VOL/BSA BIPLANE: 38 ML/M2 (ref 16–34)
ECHO LA VOLUME AREA LENGTH: 86 ML
ECHO LA VOLUME INDEX A2C: 33 ML/M2 (ref 16–34)
ECHO LA VOLUME INDEX A4C: 36 ML/M2 (ref 16–34)
ECHO LA VOLUME INDEX AREA LENGTH: 41 ML/M2 (ref 16–34)
ECHO LV E' LATERAL VELOCITY: 13 CM/S
ECHO LV E' SEPTAL VELOCITY: 9 CM/S
ECHO LV FRACTIONAL SHORTENING: 35 % (ref 28–44)
ECHO LV INTERNAL DIMENSION DIASTOLE INDEX: 2.17 CM/M2
ECHO LV INTERNAL DIMENSION DIASTOLIC: 4.6 CM (ref 4.2–5.9)
ECHO LV INTERNAL DIMENSION SYSTOLIC INDEX: 1.42 CM/M2
ECHO LV INTERNAL DIMENSION SYSTOLIC: 3 CM
ECHO LV IVSD: 1 CM (ref 0.6–1)
ECHO LV MASS 2D: 158.8 G (ref 88–224)
ECHO LV MASS INDEX 2D: 74.9 G/M2 (ref 49–115)
ECHO LV POSTERIOR WALL DIASTOLIC: 1 CM (ref 0.6–1)
ECHO LV RELATIVE WALL THICKNESS RATIO: 0.43
ECHO LVOT AREA: 3.1 CM2
ECHO LVOT AV VTI INDEX: 0.86
ECHO LVOT DIAM: 2 CM
ECHO LVOT MEAN GRADIENT: 2 MMHG
ECHO LVOT PEAK GRADIENT: 5 MMHG
ECHO LVOT PEAK VELOCITY: 1.1 M/S
ECHO LVOT STROKE VOLUME INDEX: 37 ML/M2
ECHO LVOT SV: 78.5 ML
ECHO LVOT VTI: 25 CM
ECHO MV A VELOCITY: 0.71 M/S
ECHO MV AREA PHT: 4.1 CM2
ECHO MV E DECELERATION TIME (DT): 186.2 MS
ECHO MV E VELOCITY: 1.45 M/S
ECHO MV E/A RATIO: 2.04
ECHO MV E/E' LATERAL: 11.15
ECHO MV E/E' RATIO (AVERAGED): 13.63
ECHO MV E/E' SEPTAL: 16.11
ECHO MV PRESSURE HALF TIME (PHT): 54 MS
ECHO RV FREE WALL PEAK S': 15 CM/S
ECHO RV INTERNAL DIMENSION: 3.8 CM
ECHO RV TAPSE: 2.3 CM (ref 1.7–?)

## 2023-04-25 PROCEDURE — 3075F SYST BP GE 130 - 139MM HG: CPT

## 2023-04-25 PROCEDURE — G0463 HOSPITAL OUTPT CLINIC VISIT: HCPCS

## 2023-04-25 PROCEDURE — 3078F DIAST BP <80 MM HG: CPT

## 2023-04-25 PROCEDURE — 99214 OFFICE O/P EST MOD 30 MIN: CPT

## 2023-04-25 PROCEDURE — 93306 TTE W/DOPPLER COMPLETE: CPT | Performed by: SPECIALIST

## 2023-04-25 PROCEDURE — 1123F ACP DISCUSS/DSCN MKR DOCD: CPT

## 2023-04-25 NOTE — TELEPHONE ENCOUNTER
Called [facility] to check on status of application submitted 9/08/03. Application received; in document review, sending to processing. Escalating priority. 24-48 hrs.

## 2023-04-25 NOTE — PROGRESS NOTES
Chief Complaint   Patient presents with    Follow-up    Cardiac Testing     Echo with Reinaldo Aid Today     Visit Vitals  /62 (BP 1 Location: Left upper arm, BP Patient Position: Sitting, BP Cuff Size: Large adult)   Pulse (!) 52   Resp 18   Ht 5' 9\" (1.753 m)   Wt 212 lb (96.2 kg)   SpO2 96%   BMI 31.31 kg/m²     Patient presents in office without complaint. Patient needs Eliquis 5 mg BID samples. No recent hospital visits. No refills needed at this time.

## 2023-04-27 ENCOUNTER — OFFICE VISIT (OUTPATIENT)
Dept: CARDIOLOGY CLINIC | Age: 83
End: 2023-04-27
Payer: MEDICARE

## 2023-04-27 VITALS
DIASTOLIC BLOOD PRESSURE: 52 MMHG | RESPIRATION RATE: 16 BRPM | HEART RATE: 52 BPM | OXYGEN SATURATION: 95 % | SYSTOLIC BLOOD PRESSURE: 136 MMHG | BODY MASS INDEX: 30.66 KG/M2 | WEIGHT: 207 LBS | HEIGHT: 69 IN

## 2023-04-27 DIAGNOSIS — I25.118 CORONARY ARTERY DISEASE OF NATIVE HEART WITH STABLE ANGINA PECTORIS, UNSPECIFIED VESSEL OR LESION TYPE (HCC): ICD-10-CM

## 2023-04-27 DIAGNOSIS — I10 ESSENTIAL HYPERTENSION, BENIGN: ICD-10-CM

## 2023-04-27 DIAGNOSIS — Z79.01 ANTICOAGULATED: ICD-10-CM

## 2023-04-27 DIAGNOSIS — I48.3 TYPICAL ATRIAL FLUTTER (HCC): Primary | ICD-10-CM

## 2023-04-27 PROCEDURE — G0463 HOSPITAL OUTPT CLINIC VISIT: HCPCS | Performed by: NURSE PRACTITIONER

## 2023-04-27 NOTE — PROGRESS NOTES
Room EP 5    Chief Complaint   Patient presents with    Irregular Heart Beat     Atrial Flutter     Visit Vitals  BP (!) 136/52 (BP 1 Location: Left upper arm, BP Patient Position: Sitting, BP Cuff Size: Adult)   Pulse (!) 52   Resp 16   Ht 5' 9\" (1.753 m)   Wt 207 lb (93.9 kg)   SpO2 95%   BMI 30.57 kg/m²       Atrial Flutter Ablation: 5/24/23    Chest pain: no    Shortness of breath: yes, with exertion    Edema: yes, both lower legs    Palpitations, Skipped beats, Rapid heartbeat: \"vibrations\" in chest    Dizziness: no    Fatigue:tires more easily    New diagnosis/Surgeries: no    1. Have you been to the ER, urgent care clinic since your last visit? Hospitalized since your last visit? NO      2. Have you seen or consulted any other health care providers outside of the [address] [place] [address] since your last visit? Include any pap smears or colon screening.  NO     Refills: NO

## 2023-04-27 NOTE — PATIENT INSTRUCTIONS
Your EP study and typical Aflutter ablation procedure has been scheduled for 5/24/23 at 2:00 pm, at [facility], [address], [place], [address]       Please report to Admitting Department by 12:00 pm, or 2 hours prior to your scheduled procedure. Please bring a list of your current medications and medication bottles, if able, to the hospital on this day. You will be unable to drive after your procedure so please make sure to bring someone with you to your procedure. You will need to have nothing to eat or drink after midnight, the night prior to your procedure. You may have small sips of water, if needed, to take with your medication. You will also need to see Dr. Jose Diaz Nurse Practitioner, Jose De Jesus Qiu, in office prior to your procedure. An appointment has been scheduled for 4/27/23 at 940 am.     You will need labs drawn prior to your procedure. Please go to have this done after your appointment with Lola Sousa NP. You should NOT stop your medications prior to your scheduled procedure. OK to hold Bumex morning of. After your procedure, you will need to follow up with Dr. Wendy Kemp. Your follow-up appointment has been scheduled for 6/22/23 at 1040. Atrial Flutter Ablation   Discharge Instructions      You have just had an Atrial Flutter Ablation. There were catheters temporarily placed in your heart through a puncture in the veins in your groin. WHAT TO EXPECT     If you have had an Atrial Flutter Ablation please be aware that you may experience mild chest pain that will resolve within 24-48 hours. If the chest pain persists or becomes severe, please call the office. Mild to moderate, non-painful, bruising or mild swelling at the puncture site is not un-common, and will resolve in 7 - 14 days, and may extend down your thigh as it heals. Application of Ice to the site may help with any tenderness.     You have a small gauze dressing applied to the puncture site in your groin. You may remove this the following morning. It is not uncommon to feel palpitations during the healing phase after your ablation you're your palpitations last longer than 30 minutes, or you have recurrent tachycardia for a prolonged time, please contact the office. You MAY receive a 30 day heart monitor in the mail to wear after your procedure. This is to evaluate your heart rhythm post ablation. MEDICATIONS     Take only the medications prescribed to you at discharge. ACTIVITY     A responsible adult must take you home. Do not drive a car for 24 hours. Rest quietly for the remainder of the day. Do not lift anything greater than 10 pounds for 7 days. Limit bending at the puncture site and use of stairs for at least 2 days. You may remove the bandage and shower the morning after the procedure. Do not take a bath for 3 days. SYMPTOMS THAT NEED TO BE REPORTED IMMEDIATELY     Bleeding at the puncture site. If there is bleeding, lie down and hold firm direct pressure for at least 5 minutes. If the bleeding does not stop, go to the closest emergency room, or call 911. Temperature more than 100.5 F. Redness or warmth at the puncture site. Increasing pain, numbness, coolness or blue discoloration of the extremity where the puncture is located. Pulsating mass at the puncture site. A new lump at the puncture site, or increasing swelling at the site. Please be aware that a pea or marble sized lump is normal, anything larger or increasing in size should be reported. Bruising at the puncture site that enlarges or becomes painful (some bruising at the site is common and will go away in 7-14 days). Dizziness, lightheadedness, fainting. REMEMBER: If you feel something is an emergency or cannot be handled over the phone, call 911 or go to the closest emergency room.       Dahiana Vega in 1 month  Hazel Kilgore NP in 3 months        Mata Hinds MD  Cardiac Electrophysiology / Cardiology    [address]  [facility], [address], [address]    Jerry Molina  [phone] / [phone] Fax  [phone] / [phone] Fax

## 2023-05-04 ENCOUNTER — TELEPHONE (OUTPATIENT)
Dept: CARDIOLOGY CLINIC | Age: 83
End: 2023-05-04

## 2023-05-04 DIAGNOSIS — I48.3 TYPICAL ATRIAL FLUTTER (HCC): ICD-10-CM

## 2023-05-18 ENCOUNTER — PREP FOR PROCEDURE (OUTPATIENT)
Age: 83
End: 2023-05-18

## 2023-05-18 NOTE — TELEPHONE ENCOUNTER
For Pharmacy Admin Tracking Only    Program: Medication Refill  CPA in place:    Recommendation Provided To:    Intervention Detail: New Rx: 1, reason: Patient Preference  Intervention Accepted By:   Yvrose Finch Closed?:    Time Spent (min): 5

## 2023-05-19 RX ORDER — SODIUM CHLORIDE 0.9 % (FLUSH) 0.9 %
5-40 SYRINGE (ML) INJECTION PRN
Status: CANCELLED | OUTPATIENT
Start: 2023-05-19

## 2023-05-19 RX ORDER — SODIUM CHLORIDE 9 MG/ML
INJECTION, SOLUTION INTRAVENOUS PRN
Status: CANCELLED | OUTPATIENT
Start: 2023-05-19

## 2023-05-19 RX ORDER — SODIUM CHLORIDE 0.9 % (FLUSH) 0.9 %
5-40 SYRINGE (ML) INJECTION EVERY 12 HOURS SCHEDULED
Status: CANCELLED | OUTPATIENT
Start: 2023-05-19

## 2023-05-24 ENCOUNTER — HOSPITAL ENCOUNTER (OUTPATIENT)
Facility: HOSPITAL | Age: 83
Discharge: HOME | End: 2023-05-24
Attending: INTERNAL MEDICINE | Admitting: INTERNAL MEDICINE
Payer: MEDICARE

## 2023-05-24 ENCOUNTER — ANESTHESIA (OUTPATIENT)
Facility: HOSPITAL | Age: 83
End: 2023-05-24
Payer: MEDICARE

## 2023-05-24 ENCOUNTER — ANESTHESIA EVENT (OUTPATIENT)
Facility: HOSPITAL | Age: 83
End: 2023-05-24
Payer: MEDICARE

## 2023-05-24 VITALS
DIASTOLIC BLOOD PRESSURE: 52 MMHG | RESPIRATION RATE: 13 BRPM | HEART RATE: 59 BPM | TEMPERATURE: 97.5 F | BODY MASS INDEX: 30.66 KG/M2 | HEIGHT: 69 IN | SYSTOLIC BLOOD PRESSURE: 128 MMHG | WEIGHT: 207 LBS | OXYGEN SATURATION: 92 %

## 2023-05-24 DIAGNOSIS — I48.92 ATRIAL FLUTTER (HCC): Primary | ICD-10-CM

## 2023-05-24 DIAGNOSIS — I48.91 ATRIAL FIBRILLATION (HCC): ICD-10-CM

## 2023-05-24 LAB
ECHO BSA: 2.14 M2
GLUCOSE BLD STRIP.AUTO-MCNC: 92 MG/DL (ref 65–117)
SERVICE CMNT-IMP: NORMAL

## 2023-05-24 PROCEDURE — 2500000003 HC RX 250 WO HCPCS: Performed by: NURSE ANESTHETIST, CERTIFIED REGISTERED

## 2023-05-24 PROCEDURE — 93613 INTRACARDIAC EPHYS 3D MAPG: CPT | Performed by: INTERNAL MEDICINE

## 2023-05-24 PROCEDURE — 2580000003 HC RX 258: Performed by: NURSE PRACTITIONER

## 2023-05-24 PROCEDURE — 6360000002 HC RX W HCPCS: Performed by: NURSE ANESTHETIST, CERTIFIED REGISTERED

## 2023-05-24 PROCEDURE — 93653 COMPRE EP EVAL TX SVT: CPT | Performed by: INTERNAL MEDICINE

## 2023-05-24 PROCEDURE — 82962 GLUCOSE BLOOD TEST: CPT

## 2023-05-24 PROCEDURE — 3700000000 HC ANESTHESIA ATTENDED CARE: Performed by: INTERNAL MEDICINE

## 2023-05-24 PROCEDURE — 2580000003 HC RX 258: Performed by: NURSE ANESTHETIST, CERTIFIED REGISTERED

## 2023-05-24 PROCEDURE — C1769 GUIDE WIRE: HCPCS | Performed by: INTERNAL MEDICINE

## 2023-05-24 PROCEDURE — C1732 CATH, EP, DIAG/ABL, 3D/VECT: HCPCS | Performed by: INTERNAL MEDICINE

## 2023-05-24 PROCEDURE — 3700000001 HC ADD 15 MINUTES (ANESTHESIA): Performed by: INTERNAL MEDICINE

## 2023-05-24 PROCEDURE — 2709999900 HC NON-CHARGEABLE SUPPLY: Performed by: INTERNAL MEDICINE

## 2023-05-24 PROCEDURE — 93610 INTRA-ATRIAL PACING: CPT | Performed by: INTERNAL MEDICINE

## 2023-05-24 PROCEDURE — C1894 INTRO/SHEATH, NON-LASER: HCPCS | Performed by: INTERNAL MEDICINE

## 2023-05-24 PROCEDURE — C1760 CLOSURE DEV, VASC: HCPCS | Performed by: INTERNAL MEDICINE

## 2023-05-24 PROCEDURE — C1759 CATH, INTRA ECHOCARDIOGRAPHY: HCPCS | Performed by: INTERNAL MEDICINE

## 2023-05-24 RX ORDER — ASPIRIN 81 MG/1
81 TABLET, CHEWABLE ORAL DAILY
COMMUNITY

## 2023-05-24 RX ORDER — PHENYLEPHRINE HCL IN 0.9% NACL 0.4MG/10ML
SYRINGE (ML) INTRAVENOUS PRN
Status: DISCONTINUED | OUTPATIENT
Start: 2023-05-24 | End: 2023-05-24 | Stop reason: SDUPTHER

## 2023-05-24 RX ORDER — LIDOCAINE HYDROCHLORIDE 20 MG/ML
INJECTION, SOLUTION EPIDURAL; INFILTRATION; INTRACAUDAL; PERINEURAL PRN
Status: DISCONTINUED | OUTPATIENT
Start: 2023-05-24 | End: 2023-05-24 | Stop reason: SDUPTHER

## 2023-05-24 RX ORDER — PROPOFOL 10 MG/ML
INJECTION, EMULSION INTRAVENOUS CONTINUOUS PRN
Status: DISCONTINUED | OUTPATIENT
Start: 2023-05-24 | End: 2023-05-24 | Stop reason: SDUPTHER

## 2023-05-24 RX ORDER — SODIUM CHLORIDE 0.9 % (FLUSH) 0.9 %
5-40 SYRINGE (ML) INJECTION PRN
Status: DISCONTINUED | OUTPATIENT
Start: 2023-05-24 | End: 2023-05-24 | Stop reason: HOSPADM

## 2023-05-24 RX ORDER — ONDANSETRON 2 MG/ML
INJECTION INTRAMUSCULAR; INTRAVENOUS PRN
Status: DISCONTINUED | OUTPATIENT
Start: 2023-05-24 | End: 2023-05-24 | Stop reason: SDUPTHER

## 2023-05-24 RX ORDER — M-VIT,TX,IRON,MINS/CALC/FOLIC 27MG-0.4MG
1 TABLET ORAL DAILY
COMMUNITY

## 2023-05-24 RX ORDER — HEPARIN SODIUM 200 [USP'U]/100ML
INJECTION, SOLUTION INTRAVENOUS PRN
Status: DISCONTINUED | OUTPATIENT
Start: 2023-05-24 | End: 2023-05-24

## 2023-05-24 RX ORDER — SODIUM CHLORIDE 0.9 % (FLUSH) 0.9 %
5-40 SYRINGE (ML) INJECTION EVERY 12 HOURS SCHEDULED
Status: DISCONTINUED | OUTPATIENT
Start: 2023-05-24 | End: 2023-05-24 | Stop reason: HOSPADM

## 2023-05-24 RX ORDER — HEPARIN SODIUM 200 [USP'U]/100ML
INJECTION, SOLUTION INTRAVENOUS PRN
Status: DISCONTINUED | OUTPATIENT
Start: 2023-05-24 | End: 2023-05-24 | Stop reason: SDUPTHER

## 2023-05-24 RX ORDER — SUCCINYLCHOLINE CHLORIDE 20 MG/ML
INJECTION INTRAMUSCULAR; INTRAVENOUS PRN
Status: DISCONTINUED | OUTPATIENT
Start: 2023-05-24 | End: 2023-05-24 | Stop reason: SDUPTHER

## 2023-05-24 RX ORDER — SODIUM CHLORIDE 9 MG/ML
INJECTION, SOLUTION INTRAVENOUS PRN
Status: DISCONTINUED | OUTPATIENT
Start: 2023-05-24 | End: 2023-05-24 | Stop reason: HOSPADM

## 2023-05-24 RX ADMIN — Medication 80 MCG: at 15:54

## 2023-05-24 RX ADMIN — ONDANSETRON HYDROCHLORIDE 4 MG: 2 INJECTION, SOLUTION INTRAMUSCULAR; INTRAVENOUS at 16:59

## 2023-05-24 RX ADMIN — PHENYLEPHRINE HYDROCHLORIDE 25 MCG/MIN: 10 INJECTION INTRAVENOUS at 15:53

## 2023-05-24 RX ADMIN — LIDOCAINE HYDROCHLORIDE 100 MG: 20 INJECTION, SOLUTION EPIDURAL; INFILTRATION; INTRACAUDAL; PERINEURAL at 15:29

## 2023-05-24 RX ADMIN — PROPOFOL 25 MG: 10 INJECTION, EMULSION INTRAVENOUS at 15:18

## 2023-05-24 RX ADMIN — PROPOFOL 50 MCG/KG/MIN: 10 INJECTION, EMULSION INTRAVENOUS at 15:09

## 2023-05-24 RX ADMIN — PROPOFOL 50 MG: 10 INJECTION, EMULSION INTRAVENOUS at 15:22

## 2023-05-24 RX ADMIN — PROPOFOL 150 MG: 10 INJECTION, EMULSION INTRAVENOUS at 15:29

## 2023-05-24 RX ADMIN — SUCCINYLCHOLINE CHLORIDE 160 MG: 20 INJECTION, SOLUTION INTRAMUSCULAR; INTRAVENOUS at 15:29

## 2023-05-24 RX ADMIN — Medication 80 MCG: at 15:51

## 2023-05-24 RX ADMIN — SODIUM CHLORIDE: 9 INJECTION, SOLUTION INTRAVENOUS at 15:06

## 2023-05-24 RX ADMIN — PROPOFOL 125 MCG/KG/MIN: 10 INJECTION, EMULSION INTRAVENOUS at 16:36

## 2023-05-24 RX ADMIN — HEPARIN SODIUM IN SODIUM CHLORIDE 5000 UNITS: 200 INJECTION INTRAVENOUS at 15:56

## 2023-05-24 RX ADMIN — ISOPROTERENOL HYDROCHLORIDE 5 MCG/MIN: 0.2 INJECTION, SOLUTION INTRAMUSCULAR; INTRAVENOUS at 16:18

## 2023-05-24 NOTE — PROCEDURES
ATRIAL FLUTTER ABLATION      Procedure Date: 05/24/23  Lab Physician: Ananya Smith MD    Indications:  79 yo gentleman with a history of CAD, HLD, HTN, SYLVIE, with persistent typical atrial flutter now referred for atrial flutter ablation. PROCEDURE NARRATIVE:  After obtaining informed consent the patient was brought to the cardiac electrophysiology Lab in a fasting non-sedated state. A peripheral IV was in place. Continuous electrocardiographic, blood pressure, and O2 saturation monitoring was initiated. Self-adhesive cardioversion patches were positioned on the chest. The patient was then prepped and draped in the usual sterile fashion. General anesthesia was administered by the anesthesia staff with appropriate monitoring. SHEATH INSERTION  The right groin was infiltrated with Lidocaine (1%) for local anesthesia. All sheaths were placed using the modified Seldinger technique with ultrasound guided assistance (Ultrasound evaluation of possible access sites. Patency of the selected vessel. Realtime visualization of the vascular needle entry was performed). Any long sheaths used were advanced to the heart over a guidewire using fluoroscopic guidance. An 8 Fr , 11Fr, and 8Fr sheath was inserted into the right femoral vein. CATHETER INSERTION  Catheters were advanced to the following positions using fluoroscopic guidance:  ICE in RA/RV: Biosense Intracardiac Ultrasound  Coronary Sinus: 6Fr Biosense Steerable Decapolar catheter  Ablation: BiosPrimary Data ST/SF ablation catheter    MAPPING  The [employer] CARTO 3D electroanatomical mapping system and intracardiac ultrasound were used to define atrial geometry. The initial rhythm was typical atrial flutter. The TCL was 300-310 ms. . This rhythm was verified to be counterclockwise cavotricuspid isthmus dependent atrial flutter by the use of entrainment mapping and 3D electroanatomic mapping with [employer]. Activation was proximal to distal in the CS.  Entrainment

## 2023-05-24 NOTE — DISCHARGE INSTRUCTIONS
Atrial Flutter Ablation   Discharge Instructions      You have just had an Atrial Flutter Ablation. There were catheters temporarily placed in your heart through a puncture in the veins in your groin. WHAT TO EXPECT     If you have had an Atrial Flutter Ablation please be aware that you may experience mild chest pain that will resolve within 24-48 hours. If the chest pain persists or becomes severe, please call the office. Mild to moderate, non-painful, bruising or mild swelling at the puncture site is not un-common, and will resolve in 7 - 14 days, and may extend down your thigh as it heals. Application of Ice to the site may help with any tenderness. You have a small gauze dressing applied to the puncture site in your groin. You may remove this the following morning. It is not uncommon to feel palpitations during the healing phase after your ablation you're your palpitations last longer than 30 minutes, or you have recurrent tachycardia for a prolonged time, please contact the office. You MAY receive a 30 day heart monitor in the mail to wear after your procedure. This is to evaluate your heart rhythm post ablation. MEDICATIONS     Take only the medications prescribed to you at discharge. ACTIVITY     A responsible adult must take you home. Do not drive a car for 24 hours. Rest quietly for the remainder of the day. Do not lift anything greater than 10 pounds for 7 days. Limit bending at the puncture site and use of stairs for at least 2 days. You may remove the bandage and shower the morning after the procedure. Do not take a bath for 3 days. SYMPTOMS THAT NEED TO BE REPORTED IMMEDIATELY     Bleeding at the puncture site. If there is bleeding, lie down and hold firm direct pressure for at least 5 minutes. If the bleeding does not stop, go to the closest emergency room, or call 911. Temperature more than 100.5 F.   Redness or warmth at the puncture

## 2023-05-24 NOTE — ANESTHESIA PRE PROCEDURE
CREATININE 1.47 04/27/2023 10:45 AM    GFRAA >60 06/14/2022 10:25 AM    AGRATIO 1.2 02/22/2023 03:29 PM    GLUCOSE 104 04/27/2023 10:45 AM    PROT 7.8 02/22/2023 03:29 PM    CALCIUM 9.7 04/27/2023 10:45 AM    BILITOT 0.8 02/22/2023 03:29 PM    ALKPHOS 88 02/22/2023 03:29 PM    AST 21 02/22/2023 03:29 PM    ALT 22 02/22/2023 03:29 PM       POC Tests:   Recent Labs     05/24/23  1224   POCGLU 92       Coags: No results found for: PROTIME, INR, APTT    HCG (If Applicable): No results found for: PREGTESTUR, PREGSERUM, HCG, HCGQUANT     ABGs: No results found for: PHART, PO2ART, CTZ4XCD, FZE9HRY, BEART, G4NMMHFL     Type & Screen (If Applicable):  No results found for: LABABO, LABRH    Drug/Infectious Status (If Applicable):  No results found for: HIV, HEPCAB    COVID-19 Screening (If Applicable): No results found for: COVID19        Anesthesia Evaluation  Patient summary reviewed and Nursing notes reviewed  Airway: Mallampati: II  TM distance: >3 FB   Neck ROM: full  Mouth opening: > = 3 FB   Dental:          Pulmonary:Negative Pulmonary ROS breath sounds clear to auscultation  (+) sleep apnea:                             Cardiovascular:Negative CV ROS    (+) hypertension:, angina:, CAD:, dysrhythmias: atrial flutter, CHF:,         Rhythm: regular  Rate: normal                    Neuro/Psych:   Negative Neuro/Psych ROS              GI/Hepatic/Renal: Neg GI/Hepatic/Renal ROS            Endo/Other: Negative Endo/Other ROS                    Abdominal:             Vascular: negative vascular ROS. Other Findings:           Anesthesia Plan      MAC     ASA 3       Induction: intravenous. Anesthetic plan and risks discussed with patient. Plan discussed with CRNA.     Attending anesthesiologist reviewed and agrees with Preprocedure content                Saad Guerra MD   5/24/2023

## 2023-05-24 NOTE — PROGRESS NOTES
Cardiac Cath Lab Procedure Area Arrival Note:    Brianna Lovelace arrived to Cardiac Cath Lab, Procedure Area. Patient identifiers verified with NAME and DATE OF BIRTH. Procedure verified with patient. Consent forms verified. Allergies verified. Patient informed of procedure and plan of care. Questions answered with review. Patient voiced understanding of procedure and plan of care. Patient on cardiac monitor, non-invasive blood pressure, SPO2 monitor. On nasal cannula during sleep at 2L/min. Airway to be managed by CRNA for duration of procedure. IV of normal saline on pump at 25 ml/hr. Patient status doing well without problems. Patient is A&Ox 4. Patient reports no complaints of pain. Ambulated to the bathroom with assistance prior to entering the procedural lab. Patient medicated during procedure with orders obtained and verified by Dr. Debora David. During MAC initiation, patient became extremely restless with low saturations with oxygen. Dr. Narciso Sierra at bedside for intubation.

## 2023-05-24 NOTE — PROGRESS NOTES
1800: discharge instructions reviewed with patient and friend. No medication changes, follow up appointment scheduled. Discussed restrictions, precautions, site care, activity and diet. Right groin site remains C/D/I. NO DRIVING for 24 hours due to receiving sedation.      31 75 62: OOB to bathroom with SBA, Right groin remains C/D/I.     1840: IV removed and assisted with dressing    1855: taken out in wheelchair by RN

## 2023-05-24 NOTE — PROGRESS NOTES
EP LAB to Recovery Room Report    Procedure: Atrial Flutter Ablation  MD: Dr. Chidi Hicks heart rhythm: A. Flutter   Verbal Report given to Recovery Nurse on patient being transferred to Recovery Room for routine post-op. Patient stable upon transfer to . Pt had MAC; intubated due to restlessness and oxygenation concerns. Vitals, mental status, MAR, procedural summary discussed with recovery RN. A total of Heparin 5000 units given. Sheaths:  Right femoral vein 8fr sheath removed, closed with Perclose. Right femoral vein 8fr sheath removed, closed with Perclose. Right femoral vein 11fr sheath removed, closed with Perclose.

## 2023-05-24 NOTE — ANESTHESIA POSTPROCEDURE EVALUATION
Post-Anesthesia Evaluation and Assessment    Patient: Ghassan Vital MRN: [id number]  SSN: [id number]    YOB: 1940  Age: 80 y.o. Sex: male      I have evaluated the patient and they are stable and ready for discharge from the PACU. Cardiovascular Function/Vital Signs  Visit Vitals  BP (!) 120/46   Pulse (!) 42   Temp 97.5 °F (36.4 °C) (Oral)   Resp 12   Ht 1.753 m (5' 9\")   Wt 93.9 kg (207 lb)   SpO2 (P) 91%   BMI 30.57 kg/m²       Patient is status post * No anesthesia type entered * anesthesia for Procedure(s):  Ablation A-flutter. Nausea/Vomiting: None    Postoperative hydration reviewed and adequate. Pain:      Managed    Neurological Status: At baseline    Mental Status, Level of Consciousness: Alert and  oriented to person, place, and time    Pulmonary Status:       Adequate oxygenation and airway patent    Complications related to anesthesia: None    Post-anesthesia assessment completed. No concerns    Signed By: Ok Moore MD     May 24, 2023            Department of Anesthesiology  Postprocedure Note    Patient: Ghassan Vital  MRN: [id number]  YOB: 1940  Date of evaluation: 5/24/2023      Procedure Summary     Date: 05/24/23 Room / Location: [facility] CATH LAB 3 / [facility] CARDIAC CATH LAB    Anesthesia Start: 5521 Anesthesia Stop: 7486    Procedure: Ablation A-flutter Diagnosis:       Atrial flutter ([address]      (a flutter)    Providers:  Ananya Do MD Responsible Provider: Lyubov Dill MD    Anesthesia Type: General ASA Status: 3          Anesthesia Type: General    Eva Phase I: Eva Score: 10    Eva Phase II:        Anesthesia Post Evaluation

## 2023-05-24 NOTE — PROGRESS NOTES
Cardiac Cath Lab Recovery Arrival Note:      Eloy Goldstein arrived to Cardiac Cath Lab, Recovery Area. Staff introduced to patient. Patient identifiers verified with NAME and DATE OF BIRTH. Procedure verified with patient. Consent forms reviewed and signed by patient or authorized representative and verified. Allergies verified. Patient and family oriented to department. Patient and family informed of procedure and plan of care. Questions answered with review. Patient prepped for procedure, per orders from physician, prior to arrival.    Patient on cardiac monitor, non-invasive blood pressure, SPO2 monitor. On room air. Patient is A&Ox 4. Patient reports no pain. Patient in stretcher, in low position, with side rails up, call bell within reach, patient instructed to call if assistance as needed. Patient prep in: [address]   Patient family has pager # [phone]  Family in: Friend-Maggie-in waiting [phone].    Prep by: LEILA and Chuy Ochoa

## 2023-05-24 NOTE — H&P
Cardiac Electrophysiology OFFICE Follow Up Note        Primary Cardiologist: Addison Peraza          Assessment/Plan:     1. Typical atrial flutter (HCC)   -     CBC W/O DIFF; Future   -     METABOLIC PANEL, BASIC; Future   2. Anticoagulated   -     CBC W/O DIFF; Future   -     METABOLIC PANEL, BASIC; Future   3. Essential hypertension, benign   -     CBC W/O DIFF; Future   -     METABOLIC PANEL, BASIC; Future   4. Coronary artery disease of native heart with stable angina pectoris, unspecified vessel or lesion type (HCC)   -     CBC W/O DIFF; Future   -     METABOLIC PANEL, BASIC; Future          Persistent atrial flutter   Mr Macario Goldberg is an 80year old male with atrial flutter. He is very active however aware of dyspnea on exertion. I independently reviewed multiple EKGs from today's clinic as well as prior clinics which  demonstrated negative flutter waves in the inferior lead consistent with typical counterclockwise atrial flutter. - Scheduled for a typical atrial flutter ablation with MAC anesthesia 5/24/23      Anticoagulation   Denies of any bleeding issues. Know to contact clinic with any bleeding side effects (BRBPR, melena, hemotysis, hematuria). - Continue Eliquis for thromboembolic prophylaxis      Chronic congestive heart failure   Last echocardiogram demonstrated preserved LVEF      Hypertension.   - Continue lisinopril and amlodipine       CAD.   - KEVIN to LAD 2020   - Continue aspirin and atorvastatin              Subjective:          Marly Alston is a 80 y.o. patient who is seen for follow up of  typical atrial flutter. I independently review internal and external records of most recent labs, EKGs, event monitors or Holters, and imaging including most recent echocardiograms and stress test.       Echo 2021 with EF 60-65%, LA: Mildly dilated left atrium. Left Atrium volume index is 38.68 mL/m2.       Nuclear stress test 9/22 : LV perfusion is probably normal. There is a low grade,

## 2023-05-24 NOTE — PROGRESS NOTES
TRANSFER - IN REPORT:    Verbal report received from YIFAN Zelaya and CRNA on Fangxinmei  being received from EP lab for routine post op. Report consisted of patients Situation, Background, Assessment and Recommendations(SBAR). Opportunity for questions and clarification was provided. Assessment completed upon patients arrival to [address] [place] [address]. Cardiac Cath Lab Recovery Arrival Note:    Fangxinmei arrived to [facility] recovery area. Patient procedure= EPS with Aflutter ablation. Patient on cardiac monitor, non-invasive blood pressure, SPO2 monitor. On O2 @ 4 lpm via NC.  IV saline locked. Patient status doing well without problems. Patient is A&Ox 3. Patient reports no complaints. PROCEDURE SITE CHECK:    Procedure site:Right groin without any bleeding and perclosed, zer0 pain/discomfort reported at procedure site. No change in patient status. Continue to monitor patient and status.

## 2023-05-25 LAB
EKG ATRIAL RATE: 42 BPM
EKG DIAGNOSIS: NORMAL
EKG P AXIS: 14 DEGREES
EKG P-R INTERVAL: 512 MS
EKG Q-T INTERVAL: 558 MS
EKG QRS DURATION: 146 MS
EKG QTC CALCULATION (BAZETT): 465 MS
EKG R AXIS: 100 DEGREES
EKG T AXIS: 4 DEGREES
EKG VENTRICULAR RATE: 42 BPM

## 2023-06-01 ENCOUNTER — OFFICE VISIT (OUTPATIENT)
Age: 83
End: 2023-06-01
Payer: MEDICARE

## 2023-06-01 VITALS
WEIGHT: 210.4 LBS | DIASTOLIC BLOOD PRESSURE: 83 MMHG | SYSTOLIC BLOOD PRESSURE: 142 MMHG | RESPIRATION RATE: 18 BRPM | HEIGHT: 69 IN | HEART RATE: 55 BPM | OXYGEN SATURATION: 96 % | BODY MASS INDEX: 31.16 KG/M2 | TEMPERATURE: 98.5 F

## 2023-06-01 DIAGNOSIS — Z98.890 S/P ABLATION OF ATRIAL FIBRILLATION: ICD-10-CM

## 2023-06-01 DIAGNOSIS — E11.21 TYPE 2 DIABETES MELLITUS WITH DIABETIC NEPHROPATHY, WITHOUT LONG-TERM CURRENT USE OF INSULIN (HCC): ICD-10-CM

## 2023-06-01 DIAGNOSIS — Z86.79 S/P ABLATION OF ATRIAL FIBRILLATION: ICD-10-CM

## 2023-06-01 DIAGNOSIS — I25.10 ATHEROSCLEROSIS OF NATIVE CORONARY ARTERY OF NATIVE HEART WITHOUT ANGINA PECTORIS: ICD-10-CM

## 2023-06-01 DIAGNOSIS — Z00.00 MEDICARE ANNUAL WELLNESS VISIT, SUBSEQUENT: Primary | ICD-10-CM

## 2023-06-01 DIAGNOSIS — G62.9 POLYNEUROPATHY, UNSPECIFIED: ICD-10-CM

## 2023-06-01 DIAGNOSIS — I10 ESSENTIAL (PRIMARY) HYPERTENSION: ICD-10-CM

## 2023-06-01 DIAGNOSIS — E78.2 MIXED HYPERLIPIDEMIA: ICD-10-CM

## 2023-06-01 LAB
BASOPHILS # BLD: 0.1 K/UL (ref 0–0.1)
BASOPHILS NFR BLD: 1 % (ref 0–1)
DIFFERENTIAL METHOD BLD: ABNORMAL
EOSINOPHIL # BLD: 0.4 K/UL (ref 0–0.4)
EOSINOPHIL NFR BLD: 5 % (ref 0–7)
ERYTHROCYTE [DISTWIDTH] IN BLOOD BY AUTOMATED COUNT: 16.6 % (ref 11.5–14.5)
HBA1C MFR BLD: 7.4 %
HCT VFR BLD AUTO: 42.8 % (ref 36.6–50.3)
HGB BLD-MCNC: 12.9 G/DL (ref 12.1–17)
IMM GRANULOCYTES # BLD AUTO: 0 K/UL (ref 0–0.04)
IMM GRANULOCYTES NFR BLD AUTO: 0 % (ref 0–0.5)
LYMPHOCYTES # BLD: 1.5 K/UL (ref 0.8–3.5)
LYMPHOCYTES NFR BLD: 16 % (ref 12–49)
MCH RBC QN AUTO: 27 PG (ref 26–34)
MCHC RBC AUTO-ENTMCNC: 30.1 G/DL (ref 30–36.5)
MCV RBC AUTO: 89.5 FL (ref 80–99)
MONOCYTES # BLD: 1 K/UL (ref 0–1)
MONOCYTES NFR BLD: 10 % (ref 5–13)
NEUTS SEG # BLD: 6.5 K/UL (ref 1.8–8)
NEUTS SEG NFR BLD: 68 % (ref 32–75)
NRBC # BLD: 0 K/UL (ref 0–0.01)
NRBC BLD-RTO: 0 PER 100 WBC
PLATELET # BLD AUTO: 173 K/UL (ref 150–400)
PMV BLD AUTO: 12.1 FL (ref 8.9–12.9)
RBC # BLD AUTO: 4.78 M/UL (ref 4.1–5.7)
WBC # BLD AUTO: 9.5 K/UL (ref 4.1–11.1)

## 2023-06-01 PROCEDURE — 99213 OFFICE O/P EST LOW 20 MIN: CPT | Performed by: FAMILY MEDICINE

## 2023-06-01 PROCEDURE — G8417 CALC BMI ABV UP PARAM F/U: HCPCS | Performed by: FAMILY MEDICINE

## 2023-06-01 PROCEDURE — G0439 PPPS, SUBSEQ VISIT: HCPCS | Performed by: FAMILY MEDICINE

## 2023-06-01 PROCEDURE — 1123F ACP DISCUSS/DSCN MKR DOCD: CPT | Performed by: FAMILY MEDICINE

## 2023-06-01 PROCEDURE — 3078F DIAST BP <80 MM HG: CPT | Performed by: FAMILY MEDICINE

## 2023-06-01 PROCEDURE — 3077F SYST BP >= 140 MM HG: CPT | Performed by: FAMILY MEDICINE

## 2023-06-01 PROCEDURE — 1036F TOBACCO NON-USER: CPT | Performed by: FAMILY MEDICINE

## 2023-06-01 PROCEDURE — 83036 HEMOGLOBIN GLYCOSYLATED A1C: CPT | Performed by: FAMILY MEDICINE

## 2023-06-01 PROCEDURE — PBSHW AMB POC HEMOGLOBIN A1C: Performed by: FAMILY MEDICINE

## 2023-06-01 PROCEDURE — G8427 DOCREV CUR MEDS BY ELIG CLIN: HCPCS | Performed by: FAMILY MEDICINE

## 2023-06-01 RX ORDER — QUINAPRIL 10 MG/1
10 TABLET ORAL
COMMUNITY
Start: 2016-08-17 | End: 2023-06-01 | Stop reason: ALTCHOICE

## 2023-06-01 RX ORDER — ASPIRIN 81 MG/1
81 TABLET ORAL
COMMUNITY

## 2023-06-01 RX ORDER — MELOXICAM 15 MG/1
15 TABLET ORAL DAILY
COMMUNITY
Start: 2017-06-22

## 2023-06-01 RX ORDER — BUMETANIDE 1 MG/1
1.5 TABLET ORAL DAILY
Qty: 45 TABLET | Refills: 5 | Status: SHIPPED | OUTPATIENT
Start: 2023-06-01

## 2023-06-01 SDOH — ECONOMIC STABILITY: FOOD INSECURITY: WITHIN THE PAST 12 MONTHS, THE FOOD YOU BOUGHT JUST DIDN'T LAST AND YOU DIDN'T HAVE MONEY TO GET MORE.: NEVER TRUE

## 2023-06-01 SDOH — ECONOMIC STABILITY: INCOME INSECURITY: HOW HARD IS IT FOR YOU TO PAY FOR THE VERY BASICS LIKE FOOD, HOUSING, MEDICAL CARE, AND HEATING?: NOT HARD AT ALL

## 2023-06-01 SDOH — ECONOMIC STABILITY: HOUSING INSECURITY
IN THE LAST 12 MONTHS, WAS THERE A TIME WHEN YOU DID NOT HAVE A STEADY PLACE TO SLEEP OR SLEPT IN A SHELTER (INCLUDING NOW)?: NO

## 2023-06-01 SDOH — ECONOMIC STABILITY: FOOD INSECURITY: WITHIN THE PAST 12 MONTHS, YOU WORRIED THAT YOUR FOOD WOULD RUN OUT BEFORE YOU GOT MONEY TO BUY MORE.: NEVER TRUE

## 2023-06-01 ASSESSMENT — ENCOUNTER SYMPTOMS
CONSTIPATION: 0
BLOOD IN STOOL: 0
EYE DISCHARGE: 1
SHORTNESS OF BREATH: 0
RHINORRHEA: 1
ANAL BLEEDING: 0

## 2023-06-01 ASSESSMENT — PATIENT HEALTH QUESTIONNAIRE - PHQ9
1. LITTLE INTEREST OR PLEASURE IN DOING THINGS: 0
SUM OF ALL RESPONSES TO PHQ QUESTIONS 1-9: 0
SUM OF ALL RESPONSES TO PHQ QUESTIONS 1-9: 0
2. FEELING DOWN, DEPRESSED OR HOPELESS: 0
SUM OF ALL RESPONSES TO PHQ QUESTIONS 1-9: 0
SUM OF ALL RESPONSES TO PHQ9 QUESTIONS 1 & 2: 0
SUM OF ALL RESPONSES TO PHQ QUESTIONS 1-9: 0

## 2023-06-01 ASSESSMENT — LIFESTYLE VARIABLES
HOW OFTEN DO YOU HAVE A DRINK CONTAINING ALCOHOL: NEVER
HOW MANY STANDARD DRINKS CONTAINING ALCOHOL DO YOU HAVE ON A TYPICAL DAY: PATIENT DOES NOT DRINK

## 2023-06-01 NOTE — PATIENT INSTRUCTIONS
stop and talk to your doctor. Where can you learn more? Go to http://www.chatterjee.com/ and enter P600 to learn more about \"Learning About Being Active as an Older Adult. \"  Current as of: October 10, 2022               Content Version: 13.6  © 4639-5701 Healthwise, Incorporated. Care instructions adapted under license by [facility] ([facility]). If you have questions about a medical condition or this instruction, always ask your healthcare professional. [place] [address] any warranty or liability for your use of this information. Learning About Activities of Daily Living  What are activities of daily living? Activities of daily living (ADLs) are the basic self-care tasks you do every day. As you age, and if you have health problems, you may find that it's harder to do these things for yourself. That's when you may need some help. Your doctor uses ADLs to measure how much help you need. Knowing what you can and can't do for yourself is an important first step to getting help. And when you have the help you need, you can stay as independent as possible. Your doctor will want to know if you are able to do tasks such as: Take a bath or shower without help. Go to the bathroom by yourself. Dress and undress without help. Shave, comb your hair, and brush teeth on your own. Get in and out of bed or a chair without help. Feed yourself without help. If you are having trouble doing basic self-care tasks, talk with your doctor. You may want to bring a caregiver or family member who can help the doctor understand your needs and abilities. How will a doctor assess your ADLs? Asking about ADLs is part of a routine health checkup your doctor will likely do as you age.  Your health check might be done in a doctor's office, in your home, or at a hospital. The goal is to find out if you are having any problems that could make your health problems worse or that make it unsafe for you to be

## 2023-06-01 NOTE — PROGRESS NOTES
Chief Complaint   Patient presents with    Medicare AWV     Patient is here today for his annual wellness visit, 3 month follow up, and was in [address] [place] [address] 5/24/23 for heart flutter. 1. Have you been to the ER, urgent care clinic since your last visit? Hospitalized since your last visit? [facility] 5/24/23 for heart flutter    2. Have you seen or consulted any other health care providers outside of the [address] [place] [address] since your last visit? Include any pap smears or colon screening.   No

## 2023-06-01 NOTE — PROGRESS NOTES
Subjective:      Patient ID: Leon Schuster is a 80 y.o. male. f/u dm2 with polyneuropathy,hbp,chol,s/p ablation. Feeling well    HPI       Diabetes   The history is provided by the Patient. This  is a chronic problem. The problem  occurs daily. The problem has been gradually worsening. Hypertension    The history is provided by the Patient. This is a chronic problem. The problem has been gradually worsening. Pertinent negatives include no malaise/fatigue  and no peripheral edema. Cholesterol Problem   The history is provided by the Patient. This is a chronic problem. The problem occurs daily. The problem has been gradually improving . Follow-up   The history is provided by the Patient. This  is a chronic problem. The problem  occurs daily. The problem has not changed since onset    Review of Systems   Constitutional:  Negative for activity change and fatigue. HENT:  Positive for rhinorrhea. Eyes:  Positive for discharge. Respiratory:  Negative for shortness of breath. Cardiovascular:  Positive for leg swelling. Negative for chest pain and palpitations. Gastrointestinal:  Negative for anal bleeding, blood in stool and constipation. Genitourinary:  Negative for difficulty urinating. Musculoskeletal:  Negative for arthralgias. Psychiatric/Behavioral:  Negative for agitation. Objective:   Physical Exam  Constitutional:       Appearance: Normal appearance. He is obese. HENT:      Head: Normocephalic and atraumatic. Cardiovascular:      Rate and Rhythm: Normal rate and regular rhythm. Pulses: Normal pulses. Heart sounds: Normal heart sounds. Pulmonary:      Effort: Pulmonary effort is normal.      Breath sounds: Normal breath sounds. Abdominal:      General: Abdomen is flat. Palpations: Abdomen is soft. Musculoskeletal:      Right lower leg: Edema present. Left lower leg: Edema present.       Comments: 1+ ppe   Neurological:      General: No focal deficit

## 2023-06-02 LAB
ALBUMIN SERPL-MCNC: 4.1 G/DL (ref 3.5–5)
ALBUMIN/GLOB SERPL: 1.1 (ref 1.1–2.2)
ALP SERPL-CCNC: 89 U/L (ref 45–117)
ALT SERPL-CCNC: 19 U/L (ref 12–78)
ANION GAP SERPL CALC-SCNC: 5 MMOL/L (ref 5–15)
AST SERPL-CCNC: 21 U/L (ref 15–37)
BILIRUB SERPL-MCNC: 1.2 MG/DL (ref 0.2–1)
BUN SERPL-MCNC: 23 MG/DL (ref 6–20)
BUN/CREAT SERPL: 20 (ref 12–20)
CALCIUM SERPL-MCNC: 9.9 MG/DL (ref 8.5–10.1)
CHLORIDE SERPL-SCNC: 102 MMOL/L (ref 97–108)
CHOLEST SERPL-MCNC: 102 MG/DL
CO2 SERPL-SCNC: 31 MMOL/L (ref 21–32)
CREAT SERPL-MCNC: 1.13 MG/DL (ref 0.7–1.3)
GLOBULIN SER CALC-MCNC: 3.8 G/DL (ref 2–4)
GLUCOSE SERPL-MCNC: 109 MG/DL (ref 65–100)
POTASSIUM SERPL-SCNC: 5.1 MMOL/L (ref 3.5–5.1)
PROT SERPL-MCNC: 7.9 G/DL (ref 6.4–8.2)
SODIUM SERPL-SCNC: 138 MMOL/L (ref 136–145)

## 2023-06-06 ENCOUNTER — TELEPHONE (OUTPATIENT)
Age: 83
End: 2023-06-06

## 2023-06-06 NOTE — TELEPHONE ENCOUNTER
Pt came into office asking about [facility] medication being mailed out. Let him know I would call to see what was going on. He took his last pill of Eliquis last night. Requesting samples. Let him know I could bring some to [facility] tomorrow. Called [facility],   Confirmed they have pt's application, bank statement, OOP 2022, financial information,     MD's copy is illegible; need to resend. Faxed MD portion.

## 2023-06-07 NOTE — TELEPHONE ENCOUNTER
Per verbal order from Dr. Nayely Desai  Last appt: 3/9/2023     Future Appointments   Date Time Provider Kasey Mindi   6/22/2023 10:40 AM MD BERNARDINO Baird BS [facility]   10/2/2023  9:40 AM Dottie Crouch MD [facility] BS AMB       Requested Prescriptions     Signed Prescriptions Disp Refills    apixaban (ELIQUIS) 5 MG TABS tablet 56 tablet 0     Sig: Take 1 tablet by mouth 2 times daily     Authorizing Provider: Pamela Angelo     Ordering User: Huseyin Matias

## 2023-06-08 ENCOUNTER — TELEPHONE (OUTPATIENT)
Age: 83
End: 2023-06-08

## 2023-06-08 NOTE — TELEPHONE ENCOUNTER
Fax received from [facility] stating that they need documentation of 3% OOP rx expenses. Called pt, he stated that he would wait to see what Dr. Lieutenant Paulson says on   Future Appointments   Date Time Provider Kasey Obregon   6/22/2023 10:40 AM MD BERNARDINO Baird BS AM[facility]   10/2/2023  9:40 AM Dottie Crouch MD [facility] BS AMB     As he thinks he may not have to stay on the Eliquis and we just provided him with samples yesterday for a month.

## 2023-06-21 PROBLEM — I45.89 AV NODE DYSFUNCTION: Status: ACTIVE | Noted: 2023-06-21

## 2023-06-22 ENCOUNTER — OFFICE VISIT (OUTPATIENT)
Age: 83
End: 2023-06-22
Payer: MEDICARE

## 2023-06-22 VITALS
OXYGEN SATURATION: 96 % | SYSTOLIC BLOOD PRESSURE: 150 MMHG | BODY MASS INDEX: 30.51 KG/M2 | WEIGHT: 206 LBS | HEIGHT: 69 IN | RESPIRATION RATE: 16 BRPM | DIASTOLIC BLOOD PRESSURE: 80 MMHG | HEART RATE: 62 BPM

## 2023-06-22 DIAGNOSIS — Z79.01 ANTICOAGULATED: ICD-10-CM

## 2023-06-22 DIAGNOSIS — E11.21 TYPE 2 DIABETES WITH NEPHROPATHY (HCC): ICD-10-CM

## 2023-06-22 DIAGNOSIS — I48.3 TYPICAL ATRIAL FLUTTER (HCC): Primary | ICD-10-CM

## 2023-06-22 DIAGNOSIS — G47.33 OSA (OBSTRUCTIVE SLEEP APNEA): ICD-10-CM

## 2023-06-22 DIAGNOSIS — I10 ESSENTIAL HYPERTENSION, BENIGN: ICD-10-CM

## 2023-06-22 DIAGNOSIS — E78.2 MIXED HYPERLIPIDEMIA: ICD-10-CM

## 2023-06-22 DIAGNOSIS — I45.89 AV NODE DYSFUNCTION: ICD-10-CM

## 2023-06-22 DIAGNOSIS — I25.118 CORONARY ARTERY DISEASE OF NATIVE HEART WITH STABLE ANGINA PECTORIS, UNSPECIFIED VESSEL OR LESION TYPE (HCC): ICD-10-CM

## 2023-06-22 PROCEDURE — 99214 OFFICE O/P EST MOD 30 MIN: CPT | Performed by: INTERNAL MEDICINE

## 2023-06-22 PROCEDURE — G8417 CALC BMI ABV UP PARAM F/U: HCPCS | Performed by: INTERNAL MEDICINE

## 2023-06-22 PROCEDURE — 3077F SYST BP >= 140 MM HG: CPT | Performed by: INTERNAL MEDICINE

## 2023-06-22 PROCEDURE — 1123F ACP DISCUSS/DSCN MKR DOCD: CPT | Performed by: INTERNAL MEDICINE

## 2023-06-22 PROCEDURE — 93010 ELECTROCARDIOGRAM REPORT: CPT | Performed by: INTERNAL MEDICINE

## 2023-06-22 PROCEDURE — G8427 DOCREV CUR MEDS BY ELIG CLIN: HCPCS | Performed by: INTERNAL MEDICINE

## 2023-06-22 PROCEDURE — 93005 ELECTROCARDIOGRAM TRACING: CPT | Performed by: INTERNAL MEDICINE

## 2023-06-22 PROCEDURE — 1036F TOBACCO NON-USER: CPT | Performed by: INTERNAL MEDICINE

## 2023-06-22 PROCEDURE — 3079F DIAST BP 80-89 MM HG: CPT | Performed by: INTERNAL MEDICINE

## 2023-06-22 RX ORDER — ASPIRIN 81 MG/1
81 TABLET ORAL DAILY
Qty: 30 TABLET | Refills: 3
Start: 2023-06-22

## 2023-06-22 ASSESSMENT — PATIENT HEALTH QUESTIONNAIRE - PHQ9
2. FEELING DOWN, DEPRESSED OR HOPELESS: 0
SUM OF ALL RESPONSES TO PHQ QUESTIONS 1-9: 0
SUM OF ALL RESPONSES TO PHQ9 QUESTIONS 1 & 2: 0
SUM OF ALL RESPONSES TO PHQ QUESTIONS 1-9: 0
1. LITTLE INTEREST OR PLEASURE IN DOING THINGS: 0

## 2023-06-22 NOTE — PATIENT INSTRUCTIONS
Stop Eliquis today, start Aspirin 81 mg daily  Check 2 week event monitor in 3 months  FU with NP in 6 months  FU with Dr. Elaine Shea in 1 year

## 2023-06-22 NOTE — PROGRESS NOTES
Cardiac Electrophysiology Office Follow-up    NAME: Isha Reis   :  1940  MRM:  [id number]    Date:  2023       General Cardiologist: Dr. Leidy Fitzgerald and Plan:     1. Typical atrial flutter (HCC)  -     EKG 12 Lead  2. Essential hypertension, benign  3. Coronary artery disease of native heart with stable angina pectoris, unspecified vessel or lesion type ([facility]  4. Type 2 diabetes with nephropathy ([facility]  5. SYLVIE (obstructive sleep apnea)  6. Mixed hyperlipidemia  7. Anticoagulated  8. AV node dysfunction     Persistent atrial flutter   Mr Claudio Amezcua is an 80year old male with atrial flutter. He is very active however aware of dyspnea on exertion. I independently reviewed multiple prior EKGs from clinic as well as prior clinics which demonstrated negative flutter waves in the inferior  lead consistent with typical counterclockwise atrial flutter. - Spoke to him regarding the curative nature of this rhythm through ablation therapy   - S/p typical AFL ablation, EPS also demonstrated AV harmeet disease and prolonged AH interval (424 ms) ([facility])  - he is now in sinus rhythm for the first time in several years. Reports decrease in shortness of breath and improvement in fatigue. 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  - Not on any harmeet blocking agents  - Now that he is a month from his ablation, will stop Eliquis today and initiate aspirin 81 mg daily  - We will obtain 2-week event monitor in 3 months given significant AV harmeet disease to monitor for bradycardia arrhythmias as well as undiagnosed atrial fibrillation  - Follow-up with NP in 6 months  - Follow-up with me in a year    AV harmeet disease  Significant prolonged AH interval during EP study at the time of a flutter ablation.   Has first-degree AV block on EKG today  - Not on any harmeet blocking agents  - We will obtain 2-week event monitor to assess for any bradycardia arrhythmias  - Educated

## 2023-07-03 RX ORDER — GLIPIZIDE 10 MG/1
10 TABLET ORAL 2 TIMES DAILY
Qty: 60 TABLET | Refills: 11 | Status: SHIPPED | OUTPATIENT
Start: 2023-07-03

## 2023-08-03 RX ORDER — ATORVASTATIN CALCIUM 40 MG/1
40 TABLET, FILM COATED ORAL DAILY
Qty: 90 TABLET | Refills: 3 | Status: SHIPPED | OUTPATIENT
Start: 2023-08-03

## 2023-08-03 NOTE — TELEPHONE ENCOUNTER
Requested Prescriptions     Signed Prescriptions Disp Refills    atorvastatin (LIPITOR) 40 MG tablet 90 tablet 3     Sig: Take 1 tablet by mouth daily     Authorizing Provider: Reema South     Ordering User: Sasha Rondon

## 2023-09-11 ENCOUNTER — TELEPHONE (OUTPATIENT)
Age: 83
End: 2023-09-11

## 2023-09-11 NOTE — TELEPHONE ENCOUNTER
Enrolled with [employer] - Ordered and being shipped to patient's home address on file. ETA within 5-7 business days. Message  Received: Today  YIFAN Archuleta; Olya Arenas  Can you please send him a 2 week event monitor for Afib per Dr. Kaylan Castro?   Thank you!!

## 2023-09-15 ENCOUNTER — TELEPHONE (OUTPATIENT)
Age: 83
End: 2023-09-15

## 2023-09-19 ENCOUNTER — TELEPHONE (OUTPATIENT)
Age: 83
End: 2023-09-19

## 2023-09-19 NOTE — TELEPHONE ENCOUNTER
Patient came to office concerned with a monitor he got in the mail. Explained to him that Dr. Diandra Cm ordered the monitor for 2 weeks from the office visit in June to have done in 3 months. Pt expressed understanding of plan but was sidelined that he didn't receive a phone call prior to remind him.

## 2023-10-02 ENCOUNTER — OFFICE VISIT (OUTPATIENT)
Age: 83
End: 2023-10-02
Payer: MEDICARE

## 2023-10-02 VITALS
SYSTOLIC BLOOD PRESSURE: 152 MMHG | OXYGEN SATURATION: 94 % | WEIGHT: 211.6 LBS | TEMPERATURE: 97.8 F | RESPIRATION RATE: 18 BRPM | HEIGHT: 69 IN | BODY MASS INDEX: 31.34 KG/M2 | HEART RATE: 45 BPM | DIASTOLIC BLOOD PRESSURE: 67 MMHG

## 2023-10-02 DIAGNOSIS — G62.9 POLYNEUROPATHY, UNSPECIFIED: ICD-10-CM

## 2023-10-02 DIAGNOSIS — Z23 ENCOUNTER FOR IMMUNIZATION: ICD-10-CM

## 2023-10-02 DIAGNOSIS — E78.2 MIXED HYPERLIPIDEMIA: ICD-10-CM

## 2023-10-02 DIAGNOSIS — Z86.79 S/P ABLATION OF ATRIAL FIBRILLATION: ICD-10-CM

## 2023-10-02 DIAGNOSIS — Z98.890 S/P ABLATION OF ATRIAL FIBRILLATION: ICD-10-CM

## 2023-10-02 DIAGNOSIS — I10 ESSENTIAL (PRIMARY) HYPERTENSION: ICD-10-CM

## 2023-10-02 DIAGNOSIS — E11.21 TYPE 2 DIABETES MELLITUS WITH DIABETIC NEPHROPATHY, WITHOUT LONG-TERM CURRENT USE OF INSULIN (HCC): Primary | ICD-10-CM

## 2023-10-02 DIAGNOSIS — I25.10 ATHEROSCLEROSIS OF NATIVE CORONARY ARTERY OF NATIVE HEART WITHOUT ANGINA PECTORIS: ICD-10-CM

## 2023-10-02 LAB
ALBUMIN SERPL-MCNC: 4.2 G/DL (ref 3.5–5)
ALBUMIN/GLOB SERPL: 1.3 (ref 1.1–2.2)
ALP SERPL-CCNC: 90 U/L (ref 45–117)
ALT SERPL-CCNC: 24 U/L (ref 12–78)
ANION GAP SERPL CALC-SCNC: 3 MMOL/L (ref 5–15)
AST SERPL-CCNC: 19 U/L (ref 15–37)
BILIRUB SERPL-MCNC: 0.7 MG/DL (ref 0.2–1)
BUN SERPL-MCNC: 41 MG/DL (ref 6–20)
BUN/CREAT SERPL: 31 (ref 12–20)
CALCIUM SERPL-MCNC: 9.7 MG/DL (ref 8.5–10.1)
CHLORIDE SERPL-SCNC: 105 MMOL/L (ref 97–108)
CO2 SERPL-SCNC: 28 MMOL/L (ref 21–32)
CREAT SERPL-MCNC: 1.33 MG/DL (ref 0.7–1.3)
GLOBULIN SER CALC-MCNC: 3.3 G/DL (ref 2–4)
GLUCOSE SERPL-MCNC: 190 MG/DL (ref 65–100)
HBA1C MFR BLD: 8.5 %
POTASSIUM SERPL-SCNC: 5.4 MMOL/L (ref 3.5–5.1)
PROT SERPL-MCNC: 7.5 G/DL (ref 6.4–8.2)
SODIUM SERPL-SCNC: 136 MMOL/L (ref 136–145)

## 2023-10-02 PROCEDURE — G8427 DOCREV CUR MEDS BY ELIG CLIN: HCPCS | Performed by: FAMILY MEDICINE

## 2023-10-02 PROCEDURE — 83036 HEMOGLOBIN GLYCOSYLATED A1C: CPT | Performed by: FAMILY MEDICINE

## 2023-10-02 PROCEDURE — 99214 OFFICE O/P EST MOD 30 MIN: CPT | Performed by: FAMILY MEDICINE

## 2023-10-02 PROCEDURE — 1036F TOBACCO NON-USER: CPT | Performed by: FAMILY MEDICINE

## 2023-10-02 PROCEDURE — PBSHW AMB POC HEMOGLOBIN A1C: Performed by: FAMILY MEDICINE

## 2023-10-02 PROCEDURE — 1123F ACP DISCUSS/DSCN MKR DOCD: CPT | Performed by: FAMILY MEDICINE

## 2023-10-02 PROCEDURE — 3078F DIAST BP <80 MM HG: CPT | Performed by: FAMILY MEDICINE

## 2023-10-02 PROCEDURE — 3077F SYST BP >= 140 MM HG: CPT | Performed by: FAMILY MEDICINE

## 2023-10-02 PROCEDURE — PBSHW INFLUENZA, FLUAD, (AGE 65 Y+), IM, PF, 0.5 ML: Performed by: FAMILY MEDICINE

## 2023-10-02 PROCEDURE — G8484 FLU IMMUNIZE NO ADMIN: HCPCS | Performed by: FAMILY MEDICINE

## 2023-10-02 PROCEDURE — G8417 CALC BMI ABV UP PARAM F/U: HCPCS | Performed by: FAMILY MEDICINE

## 2023-10-02 PROCEDURE — 90694 VACC AIIV4 NO PRSRV 0.5ML IM: CPT | Performed by: FAMILY MEDICINE

## 2023-10-02 ASSESSMENT — ENCOUNTER SYMPTOMS
CHEST TIGHTNESS: 0
COUGH: 0

## 2023-10-02 ASSESSMENT — PATIENT HEALTH QUESTIONNAIRE - PHQ9
SUM OF ALL RESPONSES TO PHQ QUESTIONS 1-9: 2
SUM OF ALL RESPONSES TO PHQ QUESTIONS 1-9: 2
SUM OF ALL RESPONSES TO PHQ9 QUESTIONS 1 & 2: 2
SUM OF ALL RESPONSES TO PHQ QUESTIONS 1-9: 2
SUM OF ALL RESPONSES TO PHQ QUESTIONS 1-9: 2
2. FEELING DOWN, DEPRESSED OR HOPELESS: 1
1. LITTLE INTEREST OR PLEASURE IN DOING THINGS: 1

## 2023-10-02 NOTE — PROGRESS NOTES
Chief Complaint   Patient presents with    Follow-up     Patient is here today for a 4 month follow up. 1. Have you been to the ER, urgent care clinic since your last visit? Hospitalized since your last visit? No    2. Have you seen or consulted any other health care providers outside of the [address] [place] [address] since your last visit? Include any pap smears or colon screening.  No

## 2023-10-02 NOTE — PROGRESS NOTES
Subjective:      Patient ID: Kenney Peterson is a 80 y.o. male. f/u dm2 with neuropathy,s/p ablation of a fib,hbp,chol. Feeling well       HPI      The history is provided by the Patient. This  is a chronic problem. The problem  occurs daily. The problem has been gradually worsening. Hypertension    The history is provided by the Patient. This is a chronic problem. The problem has been gradually worsening. Pertinent negatives include no malaise/fatigue  and no peripheral edema. Cholesterol Problem   The history is provided by the Patient. This is a chronic problem. The problem occurs daily. The problem has been gradually improving . Follow-up   The history is provided by the Patient. This  is a chronic problem. The problem  occurs daily. The problem has not changed since onset     Review of Systems   Constitutional:  Negative for activity change. HENT:  Negative for congestion. Respiratory:  Negative for cough and chest tightness. Cardiovascular:  Negative for chest pain and palpitations. Musculoskeletal:  Negative for arthralgias and myalgias. Neurological:  Negative for tremors and light-headedness. Psychiatric/Behavioral:  Negative for agitation. Objective:   Physical Exam  Constitutional:       Appearance: Normal appearance. HENT:      Head: Normocephalic and atraumatic. Nose: Nose normal.      Mouth/Throat:      Mouth: Mucous membranes are moist.   Eyes:      Pupils: Pupils are equal, round, and reactive to light. Cardiovascular:      Rate and Rhythm: Normal rate and regular rhythm. Pulses: Normal pulses. Heart sounds: Normal heart sounds. Pulmonary:      Effort: Pulmonary effort is normal.      Breath sounds: Normal breath sounds. Abdominal:      General: Abdomen is flat. Palpations: Abdomen is soft. Musculoskeletal:      Cervical back: Normal range of motion and neck supple. Skin:     General: Skin is warm and dry.    Neurological:      General:

## 2023-10-11 ENCOUNTER — CLINICAL DOCUMENTATION (OUTPATIENT)
Age: 83
End: 2023-10-11

## 2023-10-11 NOTE — PROGRESS NOTES
Per fax from [address] patient has been approved for patient assistance for 2024 and program will end on 12/24/2024. Letter sent to scanning.

## 2023-10-13 ENCOUNTER — TELEPHONE (OUTPATIENT)
Age: 83
End: 2023-10-13

## 2023-10-13 NOTE — TELEPHONE ENCOUNTER
----- Message from An Jessica Martinez MD sent -----  Please call the patient and let him know that his monitor demosntrated significant slow HR as low as 20 bpm. Average HR is 54 bpm and he had numerous pauses throughout the monitoring period, longest lasting 3.2 seconds. I would recommend that he proceed with a pacemaker implantation. This was discussed at the last clinic visit but base on this result, I think he should get it next available. Can we schedule him for dual PPM with MDT, at [facility] would work if we can get it in sooner. He can have an NP H&P discussion if needed. Thanks. Called patient, ID verified using two patient identifiers. Spoke with patient's girlfriend [employer] as Mr. Anayeli Salinas was driving. Mr. Anayeli Salinas gave verbal permission for me to speak with Ms. Ferny Santana. Notified of above results and recommendations. Appointment scheduled to see NP on Monday to discuss and schedule pacemaker procedure. Patient verbalized understanding and will call back with any other questions or concerns.     Future Appointments   Date Time Provider [address] [place] [address]   10/16/2023  2:00 PM MEAGAN Dailey - [facility] BS AMB   12/22/2023  9:20 AM MEAGAN Mckeon NP AMB   2/2/2024  9:40 AM Alicia Herron MD [facility] BS AMB   6/20/2024 10:40 AM Ananya Ramirez MD [facility] BS AMB

## 2023-10-16 ENCOUNTER — OFFICE VISIT (OUTPATIENT)
Age: 83
End: 2023-10-16
Payer: MEDICARE

## 2023-10-16 VITALS
WEIGHT: 212.8 LBS | DIASTOLIC BLOOD PRESSURE: 62 MMHG | OXYGEN SATURATION: 91 % | SYSTOLIC BLOOD PRESSURE: 144 MMHG | HEIGHT: 69 IN | HEART RATE: 54 BPM | BODY MASS INDEX: 31.52 KG/M2 | RESPIRATION RATE: 16 BRPM

## 2023-10-16 DIAGNOSIS — I48.3 TYPICAL ATRIAL FLUTTER (HCC): ICD-10-CM

## 2023-10-16 DIAGNOSIS — I10 ESSENTIAL HYPERTENSION, BENIGN: ICD-10-CM

## 2023-10-16 DIAGNOSIS — I45.89 AV NODE DYSFUNCTION: Primary | ICD-10-CM

## 2023-10-16 PROCEDURE — 1123F ACP DISCUSS/DSCN MKR DOCD: CPT | Performed by: NURSE PRACTITIONER

## 2023-10-16 PROCEDURE — 3077F SYST BP >= 140 MM HG: CPT | Performed by: NURSE PRACTITIONER

## 2023-10-16 PROCEDURE — 3078F DIAST BP <80 MM HG: CPT | Performed by: NURSE PRACTITIONER

## 2023-10-16 PROCEDURE — 99214 OFFICE O/P EST MOD 30 MIN: CPT | Performed by: NURSE PRACTITIONER

## 2023-10-16 RX ORDER — CHLORHEXIDINE GLUCONATE 4 G/100ML
SOLUTION TOPICAL
Qty: 1 EACH | Refills: 0 | Status: ON HOLD | OUTPATIENT
Start: 2023-10-16 | End: 2023-10-20 | Stop reason: HOSPADM

## 2023-10-16 NOTE — PROGRESS NOTES
Room #:  5    Chief Complaint   Patient presents with    Bradycardia     Pauses on monitor     Uses oxygen 2 l/m via nc at night    Vitals:    10/16/23 1401 10/16/23 1417   BP: (!) 154/70 (!) 144/62  Comment: BP rechecked   Site: Left Upper Arm Left Upper Arm   Position: Sitting Sitting   Cuff Size: Medium Adult Medium Adult   Pulse: 54    Resp: 16    SpO2: 91%    Weight: 212 lb 12.8 oz (96.5 kg)    Height: 5' 9\" (1.753 m)          Chest pain:  NO    Shortness of breath:  NO    Edema:slight lower legs, right greater than left    Palpitations, skipped beats, rapid heartbeat:  NO    Dizziness:  NO    Fatigue: NO       1. Have you been to the ER, urgent care clinic since your last visit? Hospitalized since your last visit? NO    2. Have you seen or consulted any other health care providers outside of the [address] [place] [address] since your last visit? Include any pap smears or colon screening.  NO      Refills:  NO

## 2023-10-16 NOTE — PROGRESS NOTES
Cardiac Electrophysiology Office Follow-up    NAME:  Daniel Panchal   :  1940  MRM:  [id number]    Date:  10/16/2023       General Cardiologist: Dr. Reynoso and Plan:     1. AV node dysfunction  2. Typical atrial flutter ([address])  3. Essential hypertension, benign       AV harmeet disease  Significant prolonged AH interval during EP study at the time of a flutter ablation.   - Two week monitor 2023 showed significant slow HR as low as 20 bpm. Average HR is 54 bpm and he had numerous pauses throughout the monitoring period, longest lasting 3.2 seconds. - Recommend next available dual chamber pacemaker.   - I have discussed the risks and benefits of pacemaker implant with the patient including but not limited to MI, death, bleeding, infection, lead dislodgement, pneumothorax, tamponade. Patient reports complete understanding of discussions and recommendations and wish to proceed with the procedure. Persistent atrial flutter   Mr Nelsy Perez is an 80year old male with atrial flutter. He is very active however aware of dyspnea on exertion. I independently reviewed multiple prior EKGs from clinic as well as prior clinics which demonstrated negative flutter waves in the inferior  lead consistent with typical counterclockwise atrial flutter. - Spoke to him regarding the curative nature of this rhythm through ablation therapy   - S/p typical AFL ablation, EPS also demonstrated AV harmeet disease and prolonged AH interval (424 ms) (23-[facility])  - Not on any harmeet blocking agents  - Continue aspirin 81 mg daily     Chronic congestive heart failure  Last echocardiogram demonstrated preserved LVEF. Likely contributed by atrial flutter. Hypertension  BP controlled  - Continue amlodipine           Subjective:         Daniel Panchal is a 80 y.o. patient who is seen for follow up of  typical atrial flutter and AV harmeet conduction disease. S/p AFL ablation on 23.       He wore a two week

## 2023-10-16 NOTE — DISCHARGE INSTRUCTIONS
Pacemaker  Discharge Instructions      Please make sure you have received your Temporary Pacemaker identification card with your discharge instructions      MEDICATIONS        Take only the medications prescribed to you at discharge. ACTIVITY        Return to your normal activity, except as noted below. Wear the sling for the first 24 hours. You may remove the sling after 24 hours and wear it as needed. It is important to move the affected arm to prevent shoulder stiffness and locking. Do not lift anything heavier than 10 pounds for 4 weeks with the affected arm. This is how long it takes the muscles to heal, and the leads inside your heart to stabilize their position. Do not reach above your head with the affected arm for 4 weeks, doing so increases the risk of lead dislodgement. Avoid tight clothes or unnecessary pressure over your incision (such as bra straps or seat belts). If it is tender or sensitive to clothing, cover the incision with a soft dressing or pad. Avoid driving for 48 hours. You may resume all other activities after 4 weeks (including exercise, driving, sex)      SHOWERING        Leave the bandage over your incision until your clinic follow up in 10-14 days after the Pacemaker implant. You bandage will be removed in clinic during that appointment. It is important to keep the bandaged area clean and dry. You may shower around the site until the bandage is removed in clinic. Thereafter, you may shower after the bandage is removed, washing it gently with soap and water. Do not apply any lotions, powders, or perfumes to the incision line. Avoid submerging your incision in water (tub baths, hot tubs, or swimming) for four weeks. Underneath the dressing. If you have white steri-strips over your incision (underneath the gauze dressing), they will curl up at the end and fall off, usually within 10 days.   Do not pull them off.  - OR -   You may have a different type of closure for the incision including a dermabond adhesive which will slowly peel and come off on its own once you are able to shower. DISCHARGE PRECAUTIONS        You can have an MRI after 6 weeks. You must be aware that any strong magnet or magnetic field can affect your Pacemaker. In general, be careful of metal detectors, heavy machinery, and any area where arc-welding is performed. When approaching a security checkpoint show your Pacemaker ID Card to security personnel. Always tell your doctor or dentist that you have a Pacemaker. In some cases, antibiotics may be prescribed before certain procedures. Your temporary identification will be given to you with these instructions. Keep your Pacemaker card in your wallet or on your person at all times. You should receive your permanent card, although this may take up to 8-12 weeks. If you do not receive your permanent card, please call the office at [phone] or the phone number provided on your temporary card for the pacemaker company. SYMPTOMS THAT NEED TO BE REPORTED IMMEDIATELY        Temperature more than 100.4 F    Redness or warmth at the incision site, or pain for longer than the first 5 days after the implant. Drainage from the incision site. Swelling around the incision site. Shortness of breath. Dizziness, lightheadedness, fainting. Slow pulse below 40 beats per minute. REMEMBER: If you feel something is an emergency or cannot be handled over the phone, call 911 or go to the closest emergency room.       FOLLOW-UP        Pacemaker nurse 11/3/23 at 10:20 at Otilia PACHECO 1/23/24 at 10:00 at [address] [place] [address] [address], MD  Cardiac Electrophysiology / Cardiology    [address] [facility],[address]  [place] [address] Sena Nichols, [address] [place] [address]

## 2023-10-16 NOTE — PATIENT INSTRUCTIONS
You are scheduled for the following procedure with Dr. Logan Skelton:  for a New Implanr Dual Chamber PPM at [address] [facility], [address]       PLEASE be aware that your procedure date/time is tentative and subject to change due to emergency cases. Procedure date/time:    Friday, October 20, 2023 at  2:30pm pm - please arrive by  1:00 pm      ARRIVAL time:  (You will need  to be discharged home with.)     [X]  [facility] procedures: please arrive to check in on 2nd floor two hours prior to your procedure the day of your procedure. Pre-procedure Labs/Imaging:    PRE-PROCEDURE LABS NEEDED: Yes - please have these done after 10/16/23  Forms for labwork may be given at appointment. If not, they will be mailed to you. These can be done at any [employer] or [employer].       [facility]  [address] [facility], [address] [place] [address] [address]   Monday-Friday 730A-430P (closed 6079-529M)  [phone]    [address] [place] [address], [address] [place] [address]/[facility]  Monday-Friday 8:00AM - 5:00 PM   [phone]    [facility]  [address] [facility]., [address] [facility]  Monday-Friday 7A-5P  [address], [address] [facility], [address] [facility], [facility] [address]   Monday-Friday 743R-552O  [phone] (closed 1-2P)    [facility]  [address] , [address] [facility] [address] [address]  [place], [address] [facility]  Monday-Friday 730A-430P ([id number] [id number])  [phone]    [address] [facility], [address] [facility]  Monday-Friday 7A-430P  [address] [facility] [address], [address] [facility]  Monday-Friday 730A-430P (closed 1-2P)  [phone]          Medication Instructions:    Metformin day of procedure           Hibiclens 4% topical solution

## 2023-10-20 ENCOUNTER — HOSPITAL ENCOUNTER (OUTPATIENT)
Facility: HOSPITAL | Age: 83
Setting detail: OUTPATIENT SURGERY
Discharge: HOME | End: 2023-10-20
Attending: INTERNAL MEDICINE | Admitting: INTERNAL MEDICINE
Payer: MEDICARE

## 2023-10-20 ENCOUNTER — APPOINTMENT (OUTPATIENT)
Facility: HOSPITAL | Age: 83
End: 2023-10-20
Attending: INTERNAL MEDICINE
Payer: MEDICARE

## 2023-10-20 VITALS
BODY MASS INDEX: 30.36 KG/M2 | OXYGEN SATURATION: 96 % | HEIGHT: 69 IN | TEMPERATURE: 97.9 F | WEIGHT: 205 LBS | RESPIRATION RATE: 22 BRPM | SYSTOLIC BLOOD PRESSURE: 144 MMHG | HEART RATE: 60 BPM | DIASTOLIC BLOOD PRESSURE: 64 MMHG

## 2023-10-20 DIAGNOSIS — R00.1 BRADYCARDIA: ICD-10-CM

## 2023-10-20 PROBLEM — I45.9 HEART BLOCK: Status: ACTIVE | Noted: 2023-10-20

## 2023-10-20 LAB
ECHO BSA: 2.13 M2
EKG ATRIAL RATE: 60 BPM
EKG DIAGNOSIS: NORMAL
EKG P-R INTERVAL: 178 MS
EKG Q-T INTERVAL: 498 MS
EKG QRS DURATION: 142 MS
EKG QTC CALCULATION (BAZETT): 498 MS
EKG R AXIS: -24 DEGREES
EKG T AXIS: 125 DEGREES
EKG VENTRICULAR RATE: 60 BPM
GLUCOSE BLD STRIP.AUTO-MCNC: 103 MG/DL (ref 65–117)
SERVICE CMNT-IMP: NORMAL

## 2023-10-20 PROCEDURE — 6360000002 HC RX W HCPCS: Performed by: INTERNAL MEDICINE

## 2023-10-20 PROCEDURE — 93005 ELECTROCARDIOGRAM TRACING: CPT | Performed by: INTERNAL MEDICINE

## 2023-10-20 PROCEDURE — 93010 ELECTROCARDIOGRAM REPORT: CPT | Performed by: INTERNAL MEDICINE

## 2023-10-20 PROCEDURE — C1894 INTRO/SHEATH, NON-LASER: HCPCS | Performed by: INTERNAL MEDICINE

## 2023-10-20 PROCEDURE — 6360000004 HC RX CONTRAST MEDICATION: Performed by: INTERNAL MEDICINE

## 2023-10-20 PROCEDURE — A4217 STERILE WATER/SALINE, 500 ML: HCPCS | Performed by: INTERNAL MEDICINE

## 2023-10-20 PROCEDURE — C1889 IMPLANT/INSERT DEVICE, NOC: HCPCS | Performed by: INTERNAL MEDICINE

## 2023-10-20 PROCEDURE — C1887 CATHETER, GUIDING: HCPCS | Performed by: INTERNAL MEDICINE

## 2023-10-20 PROCEDURE — 71045 X-RAY EXAM CHEST 1 VIEW: CPT

## 2023-10-20 PROCEDURE — 76937 US GUIDE VASCULAR ACCESS: CPT | Performed by: INTERNAL MEDICINE

## 2023-10-20 PROCEDURE — C1785 PMKR, DUAL, RATE-RESP: HCPCS | Performed by: INTERNAL MEDICINE

## 2023-10-20 PROCEDURE — 33208 INSRT HEART PM ATRIAL & VENT: CPT | Performed by: INTERNAL MEDICINE

## 2023-10-20 PROCEDURE — C1892 INTRO/SHEATH,FIXED,PEEL-AWAY: HCPCS | Performed by: INTERNAL MEDICINE

## 2023-10-20 PROCEDURE — 2709999900 HC NON-CHARGEABLE SUPPLY: Performed by: INTERNAL MEDICINE

## 2023-10-20 PROCEDURE — C1898 LEAD, PMKR, OTHER THAN TRANS: HCPCS | Performed by: INTERNAL MEDICINE

## 2023-10-20 PROCEDURE — 2500000003 HC RX 250 WO HCPCS: Performed by: INTERNAL MEDICINE

## 2023-10-20 PROCEDURE — 2580000003 HC RX 258: Performed by: INTERNAL MEDICINE

## 2023-10-20 PROCEDURE — 82962 GLUCOSE BLOOD TEST: CPT

## 2023-10-20 PROCEDURE — C1769 GUIDE WIRE: HCPCS | Performed by: INTERNAL MEDICINE

## 2023-10-20 RX ORDER — HEPARIN SODIUM 200 [USP'U]/100ML
INJECTION, SOLUTION INTRAVENOUS CONTINUOUS PRN
Status: COMPLETED | OUTPATIENT
Start: 2023-10-20 | End: 2023-10-20

## 2023-10-20 RX ORDER — LIDOCAINE HYDROCHLORIDE AND EPINEPHRINE 10; 10 MG/ML; UG/ML
INJECTION, SOLUTION INFILTRATION; PERINEURAL PRN
Status: DISCONTINUED | OUTPATIENT
Start: 2023-10-20 | End: 2023-10-20 | Stop reason: HOSPADM

## 2023-10-20 RX ORDER — SODIUM CHLORIDE 0.9 % (FLUSH) 0.9 %
5-40 SYRINGE (ML) INJECTION PRN
Status: DISCONTINUED | OUTPATIENT
Start: 2023-10-20 | End: 2023-10-20 | Stop reason: HOSPADM

## 2023-10-20 RX ORDER — SODIUM CHLORIDE 9 MG/ML
INJECTION, SOLUTION INTRAVENOUS PRN
Status: DISCONTINUED | OUTPATIENT
Start: 2023-10-20 | End: 2023-10-20 | Stop reason: HOSPADM

## 2023-10-20 RX ORDER — MIDAZOLAM HYDROCHLORIDE 1 MG/ML
INJECTION INTRAMUSCULAR; INTRAVENOUS PRN
Status: DISCONTINUED | OUTPATIENT
Start: 2023-10-20 | End: 2023-10-20 | Stop reason: HOSPADM

## 2023-10-20 RX ORDER — SODIUM CHLORIDE 0.9 % (FLUSH) 0.9 %
5-40 SYRINGE (ML) INJECTION EVERY 12 HOURS SCHEDULED
Status: DISCONTINUED | OUTPATIENT
Start: 2023-10-20 | End: 2023-10-20 | Stop reason: HOSPADM

## 2023-10-20 RX ORDER — FENTANYL CITRATE 50 UG/ML
INJECTION, SOLUTION INTRAMUSCULAR; INTRAVENOUS PRN
Status: DISCONTINUED | OUTPATIENT
Start: 2023-10-20 | End: 2023-10-20 | Stop reason: HOSPADM

## 2023-10-20 RX ORDER — ONDANSETRON 2 MG/ML
4 INJECTION INTRAMUSCULAR; INTRAVENOUS EVERY 6 HOURS PRN
Status: DISCONTINUED | OUTPATIENT
Start: 2023-10-20 | End: 2023-10-20 | Stop reason: HOSPADM

## 2023-10-20 RX ORDER — ACETAMINOPHEN 325 MG/1
650 TABLET ORAL EVERY 4 HOURS PRN
Status: DISCONTINUED | OUTPATIENT
Start: 2023-10-20 | End: 2023-10-20 | Stop reason: HOSPADM

## 2023-10-20 RX ORDER — SODIUM CHLORIDE 9 MG/ML
INJECTION, SOLUTION INTRAVENOUS CONTINUOUS PRN
Status: COMPLETED | OUTPATIENT
Start: 2023-10-20 | End: 2023-10-20

## 2023-10-20 RX ORDER — CEFAZOLIN SODIUM 1 G/3ML
INJECTION, POWDER, FOR SOLUTION INTRAMUSCULAR; INTRAVENOUS PRN
Status: DISCONTINUED | OUTPATIENT
Start: 2023-10-20 | End: 2023-10-20 | Stop reason: HOSPADM

## 2023-10-20 RX ORDER — BUPIVACAINE HYDROCHLORIDE 2.5 MG/ML
INJECTION, SOLUTION EPIDURAL; INFILTRATION; INTRACAUDAL PRN
Status: DISCONTINUED | OUTPATIENT
Start: 2023-10-20 | End: 2023-10-20 | Stop reason: HOSPADM

## 2023-10-20 NOTE — PROCEDURES
PERMANENT LEFT-BUNDLE PACEMAKER IMPLANTATION     Procedure Date: 10/20/23  Lab Physician: Ananya Buckley MD    INDICATIONS:  79 yo M with a history of typical AFL s/p CTI ablation (5/24/23) with evidence of prolonged AH interval of 424 ms on EPS now with evidence of bradycardia with HR down to the 20s and numerous pauses >3 seconds with evidence of multilevel conduction disease and SSS now referred for permanent left bundle pacing. Comments:  After informed consent was obtained, the patient was brought to the electrophysiology laboratory in the fasting state, and was prepped and draped in the usual sterile fashion. IV antibiotic was administered prophylactically. Conscious sedation was administered by the nursing staff independent of those performing the procedure with intermittent dosing of anxiolytics and narcotics. Permanent Left-Bundle Pacemaker Implantation:  Local anesthetic was delivered to the left pectoral region, and an incision was made in the left deltopectoral groove. The incision was extended inward by blunt dissection. The axillary vein was accessed using a micropuncture needle with ultrasound guidance and a glide wire was then advanced to the IVC. A 7F guiding sheath was advanced over the retained wire. A Medtronic His guiding sheath was advanced over a glide wire to the His position. A Medtronic 3830-69 cm His pacing catheter was advanced through the sheath. The His location was mapped to the region with a sharp His EGM. The lead was then directed 1 cm more distally. Unipolar paced morphology of \"w\" pattern with a notch at the juan of the QRS in lead V1 with impedance of 600 ohms. The lead was then further screwed in 6-8 mm deeper with notch in the lead V1 noted to moved up with increased in impedance 730 ohms. Additional rotations  were performed until the unipolar pacing impedance drops by 100-200 ohms and/or local capture was obtained when pacing from the ring of the lead.  Paced

## 2023-10-20 NOTE — PROGRESS NOTES
1:30 PM  Patient arrived. ID and allergies verified verbally with patient. Pt voices understanding of procedure to be performed. Consent obtained. Pt prepped for procedure. Pt denies contrast allergy. Patient denies taking any blood thinners. Metformin taken yesterday  Blood sugar 103      2:22 PM  TRANSFER - OUT REPORT:    Verbal [facility]  [facility] for ordered procedure       Report consisted of patient's Situation, Background, Assessment and   Recommendations(SBAR). Information from the following report(s) Nurse Handoff Report was reviewed with the receiving nurse. Lines:   Peripheral IV 10/20/23 Left Forearm (Active)        Opportunity for questions and clarification was provided. Patient transported with:  Registered Nurse      4:00 PM  TRANSFER - IN REPORT:    Verbal report received from Myron Pérez on [facility]  being received from EP for routine post-op      Report consisted of patient's Situation, Background, Assessment and   Recommendations(SBAR). Information from the following report(s) Nurse Handoff Report was reviewed with the receiving nurse. Opportunity for questions and clarification was provided. Assessment completed upon patient's arrival to unit and care assumed. Ekg-done    Chest xray done    4:55 PM  Discharge instructions and prescriptions reviewed with  Patient and friend. Opportunity provided for questions. Patient and friend verbalized understanding. Signed copy of discharge placed in the front of patient's chart. 5:25 PM  Patient ambulated tolerated well. IV and tele removed. 5:40 PM  Patient escorted via wheelchair to entrance. Friend driving. Patient discharged into care of friend.

## 2023-10-23 ENCOUNTER — TELEPHONE (OUTPATIENT)
Age: 83
End: 2023-10-23

## 2023-10-23 NOTE — TELEPHONE ENCOUNTER
Pt called for the rx for his antibiotics from getting his pacemaker on Friday. He stated that [facility] had no knowledge of the antibiotic. Please advise.           Pt # [phone]      [facility] # [phone]

## 2023-10-23 NOTE — TELEPHONE ENCOUNTER
Verified patient with two types of identifiers. Patient states that he was told an antibiotic would be ordered for him after his pacemaker procedure. Will follow up with Dr. Sabrina Norwood and return call. Patient verbalized understanding and will call with any other questions.

## 2023-10-24 NOTE — TELEPHONE ENCOUNTER
Ananya Ramirez MD  You; John Spears, RN 20 hours ago (6:00 PM)       I do not do Abx prophylactically post implant. I mentioned this to Dr. Moise Sesay over the weekend. Called patient, ID verified using two patient identifiers. Advised patient that per Dr. Henrietta Payne he does not prescribe antibiotics prophylactically after his ppm implants. Patient verbalizes understanding of all information.

## 2023-11-03 ENCOUNTER — PROCEDURE VISIT (OUTPATIENT)
Age: 83
End: 2023-11-03

## 2023-11-03 DIAGNOSIS — Z95.0 CARDIAC PACEMAKER IN SITU: Primary | ICD-10-CM

## 2023-11-03 NOTE — PROGRESS NOTES
Patient presents for wound check post-device implantation. The dressing was removed and the site was inspected. The site appeared to be well-healing without ecchymosis/tenderness/erythema. Denies pain, fevers, discharge. Future Appointments   Date Time Provider [address]   12/6/2023 11:20 AM MD RICHARD Reyna BS AMB   1/23/2024 10:00 AM PACEMAKER, STFRANCES ARSENIO BS AMB   1/23/2024 10:20 AM MEAGAN Dailey - JUNIOR CESAR BS AMB   2/2/2024  9:40 AM Alicia Herron MD [facility] BS AMB   6/20/2024 10:40 AM Ananya Ramirez MD [facility] BS AMB         Continue follow up in device clinic as planned.

## 2023-11-13 ENCOUNTER — TELEPHONE (OUTPATIENT)
Age: 83
End: 2023-11-13

## 2023-11-24 RX ORDER — LISINOPRIL 10 MG/1
10 TABLET ORAL DAILY
Qty: 90 TABLET | Refills: 2 | Status: SHIPPED | OUTPATIENT
Start: 2023-11-24

## 2023-12-06 ENCOUNTER — OFFICE VISIT (OUTPATIENT)
Age: 83
End: 2023-12-06
Payer: MEDICARE

## 2023-12-06 VITALS
WEIGHT: 206.2 LBS | DIASTOLIC BLOOD PRESSURE: 70 MMHG | HEIGHT: 69 IN | SYSTOLIC BLOOD PRESSURE: 122 MMHG | HEART RATE: 66 BPM | OXYGEN SATURATION: 97 % | BODY MASS INDEX: 30.54 KG/M2

## 2023-12-06 DIAGNOSIS — I48.3 TYPICAL ATRIAL FLUTTER (HCC): ICD-10-CM

## 2023-12-06 DIAGNOSIS — I10 ESSENTIAL (PRIMARY) HYPERTENSION: ICD-10-CM

## 2023-12-06 DIAGNOSIS — Z95.0 CARDIAC PACEMAKER IN SITU: ICD-10-CM

## 2023-12-06 DIAGNOSIS — I25.118 CORONARY ARTERY DISEASE OF NATIVE HEART WITH STABLE ANGINA PECTORIS, UNSPECIFIED VESSEL OR LESION TYPE (HCC): Primary | ICD-10-CM

## 2023-12-06 DIAGNOSIS — I45.89 AV NODE DYSFUNCTION: ICD-10-CM

## 2023-12-06 DIAGNOSIS — D68.69 SECONDARY HYPERCOAGULABLE STATE (HCC): ICD-10-CM

## 2023-12-06 PROCEDURE — 3078F DIAST BP <80 MM HG: CPT | Performed by: SPECIALIST

## 2023-12-06 PROCEDURE — G8417 CALC BMI ABV UP PARAM F/U: HCPCS | Performed by: SPECIALIST

## 2023-12-06 PROCEDURE — G8484 FLU IMMUNIZE NO ADMIN: HCPCS | Performed by: SPECIALIST

## 2023-12-06 PROCEDURE — 1036F TOBACCO NON-USER: CPT | Performed by: SPECIALIST

## 2023-12-06 PROCEDURE — 99214 OFFICE O/P EST MOD 30 MIN: CPT | Performed by: SPECIALIST

## 2023-12-06 PROCEDURE — G8427 DOCREV CUR MEDS BY ELIG CLIN: HCPCS | Performed by: SPECIALIST

## 2023-12-06 PROCEDURE — 3074F SYST BP LT 130 MM HG: CPT | Performed by: SPECIALIST

## 2023-12-06 PROCEDURE — 1123F ACP DISCUSS/DSCN MKR DOCD: CPT | Performed by: SPECIALIST

## 2023-12-06 RX ORDER — BUMETANIDE 1 MG/1
1 TABLET ORAL DAILY
Qty: 90 TABLET | Refills: 3 | Status: SHIPPED | OUTPATIENT
Start: 2023-12-06

## 2023-12-06 NOTE — PROGRESS NOTES
Michael Ardon MD. [facility]          Patient: Cabrera Schaefer  : 1940      Today's Date: 2023        HISTORY OF PRESENT ILLNESS:     History of Present Illness:  Doing better -- now has class 2 BEEBE. He has been cutting wood.           PAST MEDICAL HISTORY:     Past Medical History:   Diagnosis Date    Arthritis     CAD (coronary artery disease)     Colon cancer ([facility])     Diabetes ([facility])     Diverticular disease of colon 2010    DM (diabetes mellitus) type II controlled, neurological manifestation ([facility]) 2014    Encounter for long-term (current) use of other medications     Hypertension     Mixed hyperlipidemia 2010    pt reports medication was started due to history of DM    Obesity     SYLVIE (obstructive sleep apnea) 2010    Unspecified sleep apnea     O2 AT NIGHT 2L, CANNOT USE CPAP MASK       Past Surgical History:   Procedure Laterality Date    EP DEVICE PROCEDURE N/A 2023    Ablation A-flutter performed by Ananya Aponte MD at [address] [place] [address] CATH LAB    EP DEVICE PROCEDURE N/A 10/20/2023    Insert PPM dual performed by Ananya Cardona MD at [facility] CATH LAB    INVASIVE VASCULAR N/A 10/20/2023    Ultrasound guided vascular access performed by Ananya Ramirez MD at [facility] CATH LAB    KNEE ARTHROSCOPY Right     KNEE ARTHROSCOPY Left     LUMBAR LAMINECTOMY      OTHER SURGICAL HISTORY  1971    CIRCUMCISION, VASECTOMY    TOTAL COLECTOMY  1997    RESECTION    WISDOM TOOTH EXTRACTION               CURRENT MEDICATIONS:    .  Current Outpatient Medications   Medication Sig Dispense Refill    bumetanide (BUMEX) 1 MG tablet Take 1 tablet by mouth daily 90 tablet 3    lisinopril (PRINIVIL;ZESTRIL) 10 MG tablet TAKE 1 TABLET DAILY 90 tablet 2    dapagliflozin (FARXIGA) 10 MG tablet Take 1 tablet by mouth daily 90 tablet 3    atorvastatin (LIPITOR) 40 MG tablet Take 1 tablet by mouth daily 90 tablet 3    glipiZIDE (GLUCOTROL) 10 MG tablet Take 1 tablet by mouth 2 times daily 60

## 2023-12-06 NOTE — PROGRESS NOTES
Chief Complaint   Patient presents with    Follow-up     10 months; PM placed in October     Vitals:    12/06/23 1116   BP: 122/70   Site: Left Upper Arm   Position: Sitting   Cuff Size: Medium Adult   Pulse: 66   SpO2: 97%   Weight: 93.5 kg (206 lb 3.2 oz)   Height: 1.753 m (5' 9\")     Chest pain denied   SOB denied   Palpitations denied   Swelling in hands/feet denied   Dizziness denied   Recent hospital stays denied   Refills denied

## 2023-12-09 LAB
BUN SERPL-MCNC: 32 MG/DL (ref 8–27)
BUN/CREAT SERPL: 25 (ref 10–24)
CHLORIDE SERPL-SCNC: 97 MMOL/L (ref 96–106)
CO2 SERPL-SCNC: 23 MMOL/L (ref 20–29)
CREAT SERPL-MCNC: 1.28 MG/DL (ref 0.76–1.27)
EGFRCR SERPLBLD CKD-EPI 2021: 56 ML/MIN/1.73
GLUCOSE SERPL-MCNC: 168 MG/DL (ref 70–99)
POTASSIUM SERPL-SCNC: 5.3 MMOL/L (ref 3.5–5.2)
SODIUM SERPL-SCNC: 139 MMOL/L (ref 134–144)

## 2024-01-10 DIAGNOSIS — I10 ESSENTIAL (PRIMARY) HYPERTENSION: ICD-10-CM

## 2024-01-10 RX ORDER — BUMETANIDE 1 MG/1
1 TABLET ORAL DAILY
Qty: 90 TABLET | Refills: 3 | Status: SHIPPED | OUTPATIENT
Start: 2024-01-10

## 2024-01-10 NOTE — TELEPHONE ENCOUNTER
Refill per VO of Dr. Jon  Last appt: 12/6/2023    Future Appointments   Date Time Provider Department Center   1/23/2024 10:00 AM PACEMAKER, STFRANCES [facility] BS AMB   1/23/2024 10:20 AM Jessica Kelsey APRN - NP [facility] BS AMB   2/2/2024  9:40 AM Jeremi Bland MD [facility] BS AMB   6/7/2024 12:30 PM [facility] ECHO 1 [facility] BS AMB   6/7/2024  1:40 PM [facility]   6/20/2024 10:40 AM Ananya Ramirez MD [facility] AMB       Requested Prescriptions     Signed Prescriptions Disp Refills    bumetanide (BUMEX) 1 MG tablet 90 tablet 3     Sig: Take 1 tablet by mouth daily     Authorizing Provider: CARLOS JON     Ordering User: GRIFFIN RASCON

## 2024-01-22 NOTE — PROGRESS NOTES
Cardiac Electrophysiology Office Follow-up    NAME: Parth Rivero   :  1940  MRM:  [id number]    Date:  2024       General Cardiologist: Dr. Jon    Assessment and Plan:     1. Pacemaker  2. Chronic diastolic (congestive) heart failure (HCC)  3. Typical atrial flutter (HCC)  4. Coronary artery disease of native heart with stable angina pectoris, unspecified vessel or lesion type (HCC)  5. Anticoagulated  6. Essential hypertension, benign      Medtronic dual chamber pacemaker   9.2 years on battery   89.5%A-paced  94.5% V-paced  No arrhythmias noted  - Continue Q3 month remote device transmissions. Follow-up in the EP clinic in one year, or sooner for any concerns.     AV harmeet disease  Significant prolonged AH interval during EP study at the time of a flutter ablation.   - Two week monitor 2023 showed significant slow HR as low as 20 bpm. Average HR is 54 bpm and he had numerous pauses throughout the monitoring period, longest lasting 3.2 seconds.   - S/p Medtronic dual chamber pacemaker 10/20/23    Persistent atrial flutter   Mr Rivero is an 83 year old male with atrial flutter. He is very active however aware of dyspnea on exertion.  I independently reviewed multiple prior EKGs from clinic as well as prior clinics which demonstrated negative flutter waves in the inferior  lead consistent with typical counterclockwise atrial flutter.   - Spoke to him regarding the curative nature of this rhythm through ablation therapy   - S/p typical AFL ablation, EPS also demonstrated AV harmeet disease and prolonged AH interval (424 ms) (23-Ramirez)  - Continue aspirin 81 mg daily  - Will monitor for AF via device      Chronic congestive heart failure  Last echocardiogram demonstrated preserved LVEF.  Likely contributed by atrial flutter.     Hypertension  BP controlled  - Continue amlodipine and lisinopril           Subjective:         Parth Rivero is a 83 y.o. patient who is seen for follow up of

## 2024-01-23 ENCOUNTER — OFFICE VISIT (OUTPATIENT)
Age: 84
End: 2024-01-23
Payer: MEDICARE

## 2024-01-23 ENCOUNTER — PROCEDURE VISIT (OUTPATIENT)
Age: 84
End: 2024-01-23
Payer: MEDICARE

## 2024-01-23 VITALS
HEART RATE: 62 BPM | BODY MASS INDEX: 31.01 KG/M2 | WEIGHT: 209.4 LBS | HEIGHT: 69 IN | RESPIRATION RATE: 16 BRPM | OXYGEN SATURATION: 93 % | DIASTOLIC BLOOD PRESSURE: 60 MMHG | SYSTOLIC BLOOD PRESSURE: 142 MMHG

## 2024-01-23 DIAGNOSIS — I10 ESSENTIAL HYPERTENSION, BENIGN: ICD-10-CM

## 2024-01-23 DIAGNOSIS — I48.3 TYPICAL ATRIAL FLUTTER (HCC): ICD-10-CM

## 2024-01-23 DIAGNOSIS — Z95.0 CARDIAC PACEMAKER IN SITU: Primary | ICD-10-CM

## 2024-01-23 DIAGNOSIS — Z79.01 ANTICOAGULATED: ICD-10-CM

## 2024-01-23 DIAGNOSIS — Z95.0 PACEMAKER: Primary | ICD-10-CM

## 2024-01-23 DIAGNOSIS — I25.118 CORONARY ARTERY DISEASE OF NATIVE HEART WITH STABLE ANGINA PECTORIS, UNSPECIFIED VESSEL OR LESION TYPE (HCC): ICD-10-CM

## 2024-01-23 DIAGNOSIS — I50.32 CHRONIC DIASTOLIC (CONGESTIVE) HEART FAILURE (HCC): ICD-10-CM

## 2024-01-23 PROCEDURE — 1123F ACP DISCUSS/DSCN MKR DOCD: CPT | Performed by: NURSE PRACTITIONER

## 2024-01-23 PROCEDURE — G8484 FLU IMMUNIZE NO ADMIN: HCPCS | Performed by: NURSE PRACTITIONER

## 2024-01-23 PROCEDURE — 93280 PM DEVICE PROGR EVAL DUAL: CPT | Performed by: INTERNAL MEDICINE

## 2024-01-23 PROCEDURE — G8427 DOCREV CUR MEDS BY ELIG CLIN: HCPCS | Performed by: NURSE PRACTITIONER

## 2024-01-23 PROCEDURE — G8417 CALC BMI ABV UP PARAM F/U: HCPCS | Performed by: NURSE PRACTITIONER

## 2024-01-23 PROCEDURE — 3077F SYST BP >= 140 MM HG: CPT | Performed by: NURSE PRACTITIONER

## 2024-01-23 PROCEDURE — 1036F TOBACCO NON-USER: CPT | Performed by: NURSE PRACTITIONER

## 2024-01-23 PROCEDURE — 99214 OFFICE O/P EST MOD 30 MIN: CPT | Performed by: NURSE PRACTITIONER

## 2024-01-23 PROCEDURE — 3078F DIAST BP <80 MM HG: CPT | Performed by: NURSE PRACTITIONER

## 2024-01-23 NOTE — PROGRESS NOTES
Room #:  3    Chief Complaint   Patient presents with    Bradycardia    Other     PPM- 10/20/23       Vitals:    01/23/24 1015 01/23/24 1032   BP: (!) 152/64 (!) 142/60  Comment: BP rechecked   Site: Left Upper Arm Right Upper Arm   Position: Sitting Sitting   Cuff Size: Medium Adult Medium Adult   Pulse: 62    Resp: 16    SpO2: 93%    Weight: 95 kg (209 lb 6.4 oz)    Height: 1.753 m (5' 9\")          Right eye elevated pressure, seeing eye doctor weekly for injections      Chest pain: NO       1. Have you been to the ER, urgent care clinic outside [facility] since your last visit?  Hospitalized since your last visit?  NO        Refills:  NO

## 2024-02-02 ENCOUNTER — OFFICE VISIT (OUTPATIENT)
Age: 84
End: 2024-02-02
Payer: MEDICARE

## 2024-02-02 VITALS
DIASTOLIC BLOOD PRESSURE: 74 MMHG | BODY MASS INDEX: 31.19 KG/M2 | SYSTOLIC BLOOD PRESSURE: 140 MMHG | OXYGEN SATURATION: 93 % | RESPIRATION RATE: 18 BRPM | TEMPERATURE: 98.1 F | WEIGHT: 210.6 LBS | HEIGHT: 69 IN | HEART RATE: 79 BPM

## 2024-02-02 DIAGNOSIS — E78.2 MIXED HYPERLIPIDEMIA: ICD-10-CM

## 2024-02-02 DIAGNOSIS — Z98.890 S/P ABLATION OF ATRIAL FIBRILLATION: ICD-10-CM

## 2024-02-02 DIAGNOSIS — E11.21 TYPE 2 DIABETES MELLITUS WITH DIABETIC NEPHROPATHY, WITHOUT LONG-TERM CURRENT USE OF INSULIN (HCC): Primary | ICD-10-CM

## 2024-02-02 DIAGNOSIS — I10 ESSENTIAL (PRIMARY) HYPERTENSION: ICD-10-CM

## 2024-02-02 DIAGNOSIS — Z86.79 S/P ABLATION OF ATRIAL FIBRILLATION: ICD-10-CM

## 2024-02-02 DIAGNOSIS — N18.31 STAGE 3A CHRONIC KIDNEY DISEASE (HCC): ICD-10-CM

## 2024-02-02 DIAGNOSIS — G62.9 POLYNEUROPATHY, UNSPECIFIED: ICD-10-CM

## 2024-02-02 DIAGNOSIS — I25.10 ATHEROSCLEROSIS OF NATIVE CORONARY ARTERY OF NATIVE HEART WITHOUT ANGINA PECTORIS: ICD-10-CM

## 2024-02-02 LAB — HBA1C MFR BLD: 8.1 %

## 2024-02-02 PROCEDURE — 99214 OFFICE O/P EST MOD 30 MIN: CPT | Performed by: FAMILY MEDICINE

## 2024-02-02 PROCEDURE — 3078F DIAST BP <80 MM HG: CPT | Performed by: FAMILY MEDICINE

## 2024-02-02 PROCEDURE — G8417 CALC BMI ABV UP PARAM F/U: HCPCS | Performed by: FAMILY MEDICINE

## 2024-02-02 PROCEDURE — 3077F SYST BP >= 140 MM HG: CPT | Performed by: FAMILY MEDICINE

## 2024-02-02 PROCEDURE — 1123F ACP DISCUSS/DSCN MKR DOCD: CPT | Performed by: FAMILY MEDICINE

## 2024-02-02 PROCEDURE — 83036 HEMOGLOBIN GLYCOSYLATED A1C: CPT | Performed by: FAMILY MEDICINE

## 2024-02-02 PROCEDURE — G8427 DOCREV CUR MEDS BY ELIG CLIN: HCPCS | Performed by: FAMILY MEDICINE

## 2024-02-02 PROCEDURE — PBSHW AMB POC HEMOGLOBIN A1C: Performed by: FAMILY MEDICINE

## 2024-02-02 PROCEDURE — G8484 FLU IMMUNIZE NO ADMIN: HCPCS | Performed by: FAMILY MEDICINE

## 2024-02-02 PROCEDURE — 1036F TOBACCO NON-USER: CPT | Performed by: FAMILY MEDICINE

## 2024-02-02 RX ORDER — DORZOLAMIDE HYDROCHLORIDE AND TIMOLOL MALEATE 20; 5 MG/ML; MG/ML
SOLUTION/ DROPS OPHTHALMIC
COMMUNITY
Start: 2024-01-30

## 2024-02-02 RX ORDER — KETOROLAC TROMETHAMINE 5 MG/ML
SOLUTION OPHTHALMIC
COMMUNITY
Start: 2024-01-12

## 2024-02-02 RX ORDER — BRIMONIDINE TARTRATE 2 MG/ML
SOLUTION/ DROPS OPHTHALMIC
COMMUNITY
Start: 2024-01-12

## 2024-02-02 RX ORDER — NETARSUDIL AND LATANOPROST OPHTHALMIC SOLUTION, 0.02%/0.005% .2; .05 MG/ML; MG/ML
SOLUTION/ DROPS OPHTHALMIC; TOPICAL
COMMUNITY
Start: 2024-01-02

## 2024-02-02 RX ORDER — QUINAPRIL 10 MG/1
TABLET ORAL
COMMUNITY
Start: 2016-01-08

## 2024-02-02 RX ORDER — LATANOPROST 50 UG/ML
SOLUTION/ DROPS OPHTHALMIC
COMMUNITY
Start: 2023-12-20

## 2024-02-02 ASSESSMENT — ENCOUNTER SYMPTOMS
SHORTNESS OF BREATH: 0
APNEA: 0
ABDOMINAL PAIN: 0

## 2024-02-02 ASSESSMENT — PATIENT HEALTH QUESTIONNAIRE - PHQ9
SUM OF ALL RESPONSES TO PHQ9 QUESTIONS 1 & 2: 2
SUM OF ALL RESPONSES TO PHQ QUESTIONS 1-9: 2
2. FEELING DOWN, DEPRESSED OR HOPELESS: 1
SUM OF ALL RESPONSES TO PHQ QUESTIONS 1-9: 2
SUM OF ALL RESPONSES TO PHQ QUESTIONS 1-9: 2
1. LITTLE INTEREST OR PLEASURE IN DOING THINGS: 1
SUM OF ALL RESPONSES TO PHQ QUESTIONS 1-9: 2

## 2024-02-02 NOTE — PROGRESS NOTES
Subjective:      Patient ID: Parth Rivero is a 83 y.o. male.f/u dm2 ,hbp,chol,cad,glaucoma.Feeling well    HPI   Hypertension    The history is provided by the Patient.  This is a chronic problem.  The problem has been gradually worsening. Pertinent negatives include no malaise/fatigue  and no peripheral edema.    Cholesterol Problem   The history is provided by the Patient.  This is a chronic problem.  The problem occurs daily. The problem has been gradually improving .    Follow-up   The history is provided by the Patient. This  is a chronic problem. The problem  occurs daily.  The problem has not changed since onset       Review of Systems   Constitutional:  Negative for activity change and fatigue.   Respiratory:  Negative for apnea and shortness of breath.    Cardiovascular:  Negative for chest pain, palpitations and leg swelling.   Gastrointestinal:  Negative for abdominal pain.   Genitourinary:  Negative for difficulty urinating.   Musculoskeletal:  Positive for arthralgias.   Neurological:  Negative for dizziness.   Psychiatric/Behavioral:  Negative for agitation.      Objective:   Physical Exam  Constitutional:       Appearance: Normal appearance.   HENT:      Head: Normocephalic and atraumatic.      Nose: Nose normal.      Mouth/Throat:      Mouth: Mucous membranes are moist.   Eyes:      Extraocular Movements: Extraocular movements intact.      Pupils: Pupils are equal, round, and reactive to light.   Cardiovascular:      Rate and Rhythm: Normal rate and regular rhythm.      Pulses: Normal pulses.      Heart sounds: Normal heart sounds.   Pulmonary:      Effort: Pulmonary effort is normal.      Breath sounds: Normal breath sounds.   Abdominal:      General: Abdomen is flat.   Skin:     General: Skin is warm and dry.      Comments: Comprehensive Diabetic Foot Exam  was performed     Neurological:      Mental Status: He is alert and oriented to person, place, and time.     Parth was seen today for

## 2024-02-02 NOTE — PROGRESS NOTES
Chief Complaint   Patient presents with    Follow-up     Patient is here today for a 4 month follow up.      1. Have you been to the ER, urgent care clinic since your last visit?  Hospitalized since your last visit? [facility] 10[facility]20[facility]23 for bradycardia and pacemaker put in    2. Have you seen or consulted any other health care providers outside of the [facility] since your last visit?  Include any pap smears or colon screening. No

## 2024-02-03 LAB
ALBUMIN SERPL-MCNC: 4.2 G/DL (ref 3.5–5)
ALBUMIN/GLOB SERPL: 1.2 (ref 1.1–2.2)
ALP SERPL-CCNC: 85 U/L (ref 45–117)
ALT SERPL-CCNC: 20 U/L (ref 12–78)
ANION GAP SERPL CALC-SCNC: 3 MMOL/L (ref 5–15)
AST SERPL-CCNC: 18 U/L (ref 15–37)
BILIRUB SERPL-MCNC: 0.8 MG/DL (ref 0.2–1)
BUN SERPL-MCNC: 25 MG/DL (ref 6–20)
BUN/CREAT SERPL: 21 (ref 12–20)
CALCIUM SERPL-MCNC: 10 MG/DL (ref 8.5–10.1)
CHLORIDE SERPL-SCNC: 104 MMOL/L (ref 97–108)
CHOLEST SERPL-MCNC: 142 MG/DL
CO2 SERPL-SCNC: 31 MMOL/L (ref 21–32)
CREAT SERPL-MCNC: 1.19 MG/DL (ref 0.7–1.3)
GLOBULIN SER CALC-MCNC: 3.4 G/DL (ref 2–4)
GLUCOSE SERPL-MCNC: 179 MG/DL (ref 65–100)
HDLC SERPL-MCNC: 37 MG/DL
HDLC SERPL: 3.8 (ref 0–5)
LDLC SERPL CALC-MCNC: 52.8 MG/DL (ref 0–100)
POTASSIUM SERPL-SCNC: 4.8 MMOL/L (ref 3.5–5.1)
PROT SERPL-MCNC: 7.6 G/DL (ref 6.4–8.2)
SODIUM SERPL-SCNC: 138 MMOL/L (ref 136–145)
TRIGL SERPL-MCNC: 261 MG/DL
VLDLC SERPL CALC-MCNC: 52.2 MG/DL

## 2024-02-20 DIAGNOSIS — I10 ESSENTIAL (PRIMARY) HYPERTENSION: ICD-10-CM

## 2024-02-20 RX ORDER — AMLODIPINE BESYLATE 10 MG/1
10 TABLET ORAL DAILY
Qty: 90 TABLET | Refills: 3 | Status: SHIPPED | OUTPATIENT
Start: 2024-02-20

## 2024-02-20 NOTE — TELEPHONE ENCOUNTER
Refill per VO of Dr. Jon  Last appt: 12/6/2023    Future Appointments   Date Time Provider Department Center   6/3/2024  9:40 AM Jeremi Bland MD [facility] BS AMB   6/7/2024 12:30 PM [facility] ECHO 1 [facility]  AMB   6/7/2024  1:40 PM Nettie Bethea, APRN - NP [facility] AMB   2/19/2025 10:40 AM PACEMAKER, STFRANCES [facility]   2/19/2025 11:00 AM Ananya Raimrez MD [facility] AMB       Requested Prescriptions     Signed Prescriptions Disp Refills    amLODIPine (NORVASC) 10 MG tablet 90 tablet 3     Sig: TAKE 1 TABLET BY MOUTH EVERY DAY     Authorizing Provider: CARLOS JON     Ordering User: DAIJA JAUREGUI

## 2024-03-12 ENCOUNTER — TELEPHONE (OUTPATIENT)
Age: 84
End: 2024-03-12

## 2024-03-12 NOTE — TELEPHONE ENCOUNTER
Patient is requesting a RX refill  metFORMIN (GLUCOPHAGE) 850 MG tablet  he can be reached @ [phone] he is completely out

## 2024-03-13 NOTE — TELEPHONE ENCOUNTER
Called the pt to see where he wanted his refill to go and he stated [facility].  He got some pills from a friend to hold him over until hi is delivered.  Rx sent to Dr. Leon for approval.

## 2024-04-12 ENCOUNTER — TELEPHONE (OUTPATIENT)
Age: 84
End: 2024-04-12

## 2024-04-12 DIAGNOSIS — I10 ESSENTIAL (PRIMARY) HYPERTENSION: ICD-10-CM

## 2024-04-12 RX ORDER — BUMETANIDE 1 MG/1
1 TABLET ORAL DAILY
Qty: 90 TABLET | Refills: 3 | Status: SHIPPED | OUTPATIENT
Start: 2024-04-12

## 2024-04-12 NOTE — TELEPHONE ENCOUNTER
\"Please call pt on his cell [phone], he has questions about his meds, thanks\"    Asking about refill for fluid pills Bumex 1 mg; confirmed pharmacy.    Refill per VO of Dr. Jon  Last appt: 12/6/2023    Future Appointments   Date Time Provider Department Center   6/3/2024  9:40 AM Jeremi Bland MD [facility] BS [facility]   6/7/2024 12:30 PM [facility] ECHO 1 [facility]   6/7/2024  1:40 PM Nettie Bethea, APRN - NP [facility]   2/19/2025 10:40 AM PACEMAKER, STFRANCES [facility]   2/19/2025 11:00 AM Ananya Ramirez MD [facility] AMB       Requested Prescriptions     Signed Prescriptions Disp Refills    bumetanide (BUMEX) 1 MG tablet 90 tablet 3     Sig: Take 1 tablet by mouth daily     Authorizing Provider: CARLOS JON     Ordering User: GRIFFIN RASCON

## 2024-04-23 ENCOUNTER — TELEPHONE (OUTPATIENT)
Age: 84
End: 2024-04-23

## 2024-05-21 RX ORDER — LISINOPRIL 10 MG/1
10 TABLET ORAL DAILY
Qty: 90 TABLET | Refills: 3 | Status: SHIPPED | OUTPATIENT
Start: 2024-05-21

## 2024-05-21 NOTE — TELEPHONE ENCOUNTER
Refill per VO of Dr. Jon  Last appt: 12/6/2023    Future Appointments   Date Time Provider Department Center   6/3/2024  9:40 AM Jeremi Bland MD [facility]   6/7/2024 12:30 PM [facility]   6/7/2024  1:40 PM Nettie Bethea, APRN - NP [facility]   2/19/2025 10:40 AM PACEMAKER, STFRANCES [facility]   2/19/2025 11:00 AM Ananya Ramirez MD [facility] AMB       Requested Prescriptions     Signed Prescriptions Disp Refills    lisinopril (PRINIVIL;ZESTRIL) 10 MG tablet 90 tablet 3     Sig: Take 1 tablet by mouth daily     Authorizing Provider: CARLOS JON     Ordering User: GRIFFIN RASCON     Pt in office, stated he is back on Eliquis.  Provided [employer] PAF application.  He was able to  90 days supply of Eliquis.

## 2024-06-03 ENCOUNTER — OFFICE VISIT (OUTPATIENT)
Age: 84
End: 2024-06-03
Payer: MEDICARE

## 2024-06-03 VITALS
WEIGHT: 201.4 LBS | DIASTOLIC BLOOD PRESSURE: 62 MMHG | OXYGEN SATURATION: 96 % | TEMPERATURE: 98 F | SYSTOLIC BLOOD PRESSURE: 143 MMHG | RESPIRATION RATE: 18 BRPM | HEIGHT: 69 IN | BODY MASS INDEX: 29.83 KG/M2 | HEART RATE: 68 BPM

## 2024-06-03 DIAGNOSIS — E78.2 MIXED HYPERLIPIDEMIA: ICD-10-CM

## 2024-06-03 DIAGNOSIS — N18.31 STAGE 3A CHRONIC KIDNEY DISEASE (HCC): ICD-10-CM

## 2024-06-03 DIAGNOSIS — I10 ESSENTIAL (PRIMARY) HYPERTENSION: ICD-10-CM

## 2024-06-03 DIAGNOSIS — Z98.890 S/P ABLATION OF ATRIAL FIBRILLATION: ICD-10-CM

## 2024-06-03 DIAGNOSIS — Z86.79 S/P ABLATION OF ATRIAL FIBRILLATION: ICD-10-CM

## 2024-06-03 DIAGNOSIS — J01.00 SUBACUTE MAXILLARY SINUSITIS: ICD-10-CM

## 2024-06-03 DIAGNOSIS — I25.10 ATHEROSCLEROSIS OF NATIVE CORONARY ARTERY OF NATIVE HEART WITHOUT ANGINA PECTORIS: ICD-10-CM

## 2024-06-03 DIAGNOSIS — Z00.00 MEDICARE ANNUAL WELLNESS VISIT, SUBSEQUENT: Primary | ICD-10-CM

## 2024-06-03 DIAGNOSIS — E11.21 TYPE 2 DIABETES MELLITUS WITH DIABETIC NEPHROPATHY, WITHOUT LONG-TERM CURRENT USE OF INSULIN (HCC): ICD-10-CM

## 2024-06-03 DIAGNOSIS — G47.33 OSA (OBSTRUCTIVE SLEEP APNEA): ICD-10-CM

## 2024-06-03 LAB
ALBUMIN SERPL-MCNC: 3.9 G/DL (ref 3.5–5)
ALBUMIN/GLOB SERPL: 1.1 (ref 1.1–2.2)
ALP SERPL-CCNC: 88 U/L (ref 45–117)
ALT SERPL-CCNC: 26 U/L (ref 12–78)
ANION GAP SERPL CALC-SCNC: 7 MMOL/L (ref 5–15)
AST SERPL-CCNC: 24 U/L (ref 15–37)
BASOPHILS # BLD: 0 K/UL (ref 0–0.1)
BASOPHILS NFR BLD: 0 % (ref 0–1)
BILIRUB SERPL-MCNC: 0.8 MG/DL (ref 0.2–1)
BUN SERPL-MCNC: 35 MG/DL (ref 6–20)
BUN/CREAT SERPL: 27 (ref 12–20)
CALCIUM SERPL-MCNC: 9.8 MG/DL (ref 8.5–10.1)
CHLORIDE SERPL-SCNC: 102 MMOL/L (ref 97–108)
CHOLEST SERPL-MCNC: 127 MG/DL
CO2 SERPL-SCNC: 27 MMOL/L (ref 21–32)
CREAT SERPL-MCNC: 1.32 MG/DL (ref 0.7–1.3)
DIFFERENTIAL METHOD BLD: ABNORMAL
EOSINOPHIL # BLD: 0.6 K/UL (ref 0–0.4)
EOSINOPHIL NFR BLD: 7 % (ref 0–7)
ERYTHROCYTE [DISTWIDTH] IN BLOOD BY AUTOMATED COUNT: 12.5 % (ref 11.5–14.5)
GLOBULIN SER CALC-MCNC: 3.5 G/DL (ref 2–4)
GLUCOSE SERPL-MCNC: 158 MG/DL (ref 65–100)
HBA1C MFR BLD: 8.4 %
HCT VFR BLD AUTO: 43.8 % (ref 36.6–50.3)
HGB BLD-MCNC: 14 G/DL (ref 12.1–17)
IMM GRANULOCYTES # BLD AUTO: 0 K/UL (ref 0–0.04)
IMM GRANULOCYTES NFR BLD AUTO: 0 % (ref 0–0.5)
LYMPHOCYTES # BLD: 1.6 K/UL (ref 0.8–3.5)
LYMPHOCYTES NFR BLD: 20 % (ref 12–49)
MCH RBC QN AUTO: 29.7 PG (ref 26–34)
MCHC RBC AUTO-ENTMCNC: 32 G/DL (ref 30–36.5)
MCV RBC AUTO: 93 FL (ref 80–99)
MONOCYTES # BLD: 0.8 K/UL (ref 0–1)
MONOCYTES NFR BLD: 10 % (ref 5–13)
NEUTS SEG # BLD: 4.9 K/UL (ref 1.8–8)
NEUTS SEG NFR BLD: 63 % (ref 32–75)
NRBC # BLD: 0 K/UL (ref 0–0.01)
NRBC BLD-RTO: 0 PER 100 WBC
PLATELET # BLD AUTO: 151 K/UL (ref 150–400)
PMV BLD AUTO: 12.3 FL (ref 8.9–12.9)
POTASSIUM SERPL-SCNC: 4.4 MMOL/L (ref 3.5–5.1)
PROT SERPL-MCNC: 7.4 G/DL (ref 6.4–8.2)
RBC # BLD AUTO: 4.71 M/UL (ref 4.1–5.7)
SODIUM SERPL-SCNC: 136 MMOL/L (ref 136–145)
WBC # BLD AUTO: 7.9 K/UL (ref 4.1–11.1)

## 2024-06-03 PROCEDURE — 99213 OFFICE O/P EST LOW 20 MIN: CPT | Performed by: FAMILY MEDICINE

## 2024-06-03 PROCEDURE — 1036F TOBACCO NON-USER: CPT | Performed by: FAMILY MEDICINE

## 2024-06-03 PROCEDURE — 3077F SYST BP >= 140 MM HG: CPT | Performed by: FAMILY MEDICINE

## 2024-06-03 PROCEDURE — PBSHW AMB POC HEMOGLOBIN A1C: Performed by: FAMILY MEDICINE

## 2024-06-03 PROCEDURE — G8427 DOCREV CUR MEDS BY ELIG CLIN: HCPCS | Performed by: FAMILY MEDICINE

## 2024-06-03 PROCEDURE — 3078F DIAST BP <80 MM HG: CPT | Performed by: FAMILY MEDICINE

## 2024-06-03 PROCEDURE — 83036 HEMOGLOBIN GLYCOSYLATED A1C: CPT | Performed by: FAMILY MEDICINE

## 2024-06-03 PROCEDURE — 1123F ACP DISCUSS/DSCN MKR DOCD: CPT | Performed by: FAMILY MEDICINE

## 2024-06-03 PROCEDURE — G8417 CALC BMI ABV UP PARAM F/U: HCPCS | Performed by: FAMILY MEDICINE

## 2024-06-03 PROCEDURE — G0439 PPPS, SUBSEQ VISIT: HCPCS | Performed by: FAMILY MEDICINE

## 2024-06-03 RX ORDER — AMOXICILLIN 500 MG/1
500 CAPSULE ORAL 3 TIMES DAILY
Qty: 21 CAPSULE | Refills: 0 | Status: SHIPPED | OUTPATIENT
Start: 2024-06-03 | End: 2024-06-10

## 2024-06-03 SDOH — ECONOMIC STABILITY: FOOD INSECURITY: WITHIN THE PAST 12 MONTHS, YOU WORRIED THAT YOUR FOOD WOULD RUN OUT BEFORE YOU GOT MONEY TO BUY MORE.: NEVER TRUE

## 2024-06-03 SDOH — ECONOMIC STABILITY: INCOME INSECURITY: HOW HARD IS IT FOR YOU TO PAY FOR THE VERY BASICS LIKE FOOD, HOUSING, MEDICAL CARE, AND HEATING?: SOMEWHAT HARD

## 2024-06-03 SDOH — ECONOMIC STABILITY: FOOD INSECURITY: WITHIN THE PAST 12 MONTHS, THE FOOD YOU BOUGHT JUST DIDN'T LAST AND YOU DIDN'T HAVE MONEY TO GET MORE.: NEVER TRUE

## 2024-06-03 ASSESSMENT — PATIENT HEALTH QUESTIONNAIRE - PHQ9
SUM OF ALL RESPONSES TO PHQ QUESTIONS 1-9: 2
SUM OF ALL RESPONSES TO PHQ QUESTIONS 1-9: 2
2. FEELING DOWN, DEPRESSED OR HOPELESS: SEVERAL DAYS
1. LITTLE INTEREST OR PLEASURE IN DOING THINGS: SEVERAL DAYS
SUM OF ALL RESPONSES TO PHQ9 QUESTIONS 1 & 2: 2
SUM OF ALL RESPONSES TO PHQ QUESTIONS 1-9: 2
SUM OF ALL RESPONSES TO PHQ QUESTIONS 1-9: 2

## 2024-06-03 ASSESSMENT — LIFESTYLE VARIABLES
HOW MANY STANDARD DRINKS CONTAINING ALCOHOL DO YOU HAVE ON A TYPICAL DAY: PATIENT DOES NOT DRINK
HOW OFTEN DO YOU HAVE A DRINK CONTAINING ALCOHOL: NEVER

## 2024-06-03 ASSESSMENT — ENCOUNTER SYMPTOMS
VISUAL CHANGE: 0
COUGH: 1
BLOOD IN STOOL: 0
ABDOMINAL PAIN: 0
ANAL BLEEDING: 0

## 2024-06-03 NOTE — PROGRESS NOTES
Chief Complaint   Patient presents with    Medicare AWV     Patient is here today for his medicare annual wellness exam and 4 month follow up.      1. Have you been to the ER, urgent care clinic since your last visit?  Hospitalized since your last visit?No    2. Have you seen or consulted any other health care providers outside of the [facility] since your last visit?  Include any pap smears or colon screening. No

## 2024-06-03 NOTE — PATIENT INSTRUCTIONS
Learning About Being Active as an Older Adult  Why is being active important as you get older?     Being active is one of the best things you can do for your health. And it's never too late to start. Being active--or getting active, if you aren't already--has definite benefits. It can:  Give you more energy,  Keep your mind sharp.  Improve balance to reduce your risk of falls.  Help you manage chronic illness with fewer medicines.  No matter how old you are, how fit you are, or what health problems you have, there is a form of activity that will work for you. And the more physical activity you can do, the better your overall health will be.  What kinds of activity can help you stay healthy?  Being more active will make your daily activities easier. Physical activity includes planned exercise and things you do in daily life. There are four types of activity:  Aerobic.  Doing aerobic activity makes your heart and lungs strong.  Includes walking, dancing, and gardening.  Aim for at least 2½ hours spread throughout the week.  It improves your energy and can help you sleep better.  Muscle-strengthening.  This type of activity can help maintain muscle and strengthen bones.  Includes climbing stairs, using resistance bands, and lifting or carrying heavy loads.  Aim for at least twice a week.  It can help protect the knees and other joints.  Stretching.  Stretching gives you better range of motion in joints and muscles.  Includes upper arm stretches, calf stretches, and gentle yoga.  Aim for at least twice a week, preferably after your muscles are warmed up from other activities.  It can help you function better in daily life.  Balancing.  This helps you stay coordinated and have good posture.  Includes heel-to-toe walking, lesley chi, and certain types of yoga.  Aim for at least 3 days a week.  It can reduce your risk of falling.  Even if you have a hard time meeting the recommendations, it's better to be more active

## 2024-06-03 NOTE — PROGRESS NOTES
NAME:  Parth Rivero   :   1940   MRN:   [id number]     Date/Time:  6/3/2024 10:09 AM  Subjective:f/u dm2 hbp,cholpaf,a/c.Recovering frfom recent sinusitis   Diabetes  He presents for his follow-up diabetic visit. His disease course has been stable. Pertinent negatives for hypoglycemia include no tremors. Pertinent negatives for diabetes include no chest pain, no fatigue, no foot paresthesias, no polyphagia, no polyuria, no visual change and no weakness. There are no hypoglycemic complications. Pertinent negatives for hypoglycemia complications include no blackouts. Diabetic complications include peripheral neuropathy.    Hypertension    The history is provided by the Patient.  This is a chronic problem.  The problem has been gradually worsening. Pertinent negatives include no malaise/fatigue  and no peripheral edema.    Cholesterol Problem   The history is provided by the Patient.  This is a chronic problem.  The problem occurs daily. The problem has been gradually improving .    Follow-up   The history is provided by the Patient. This  is a chronic problem. The problem  occurs daily.  The problem has not changed since onset     Review of Systems   Constitutional:  Negative for fatigue.   HENT:  Positive for congestion.    Respiratory:  Positive for cough.    Cardiovascular:  Negative for chest pain and leg swelling.   Gastrointestinal:  Negative for abdominal pain, anal bleeding and blood in stool.   Endocrine: Negative for polyphagia and polyuria.   Genitourinary:  Negative for frequency and urgency.   Musculoskeletal:  Negative for arthralgias.   Neurological:  Negative for tremors and weakness.   Psychiatric/Behavioral:  Negative for decreased concentration.            Medications reviewed:  Current Outpatient Medications   Medication Sig    amoxicillin (AMOXIL) 500 MG capsule Take 1 capsule by mouth 3 times daily for 7 days    lisinopril (PRINIVIL;ZESTRIL) 10 MG tablet Take 1 tablet by mouth

## 2024-06-06 NOTE — PROGRESS NOTES
Patient: Parth Rivero  : 1940    Primary Cardiologist: Mamta Jon MD. [facility]  Last Office Visit: 23      Today's Date: 2024        HISTORY OF PRESENT ILLNESS:     History of Present Illness:  Presents today for follow-up. Recent cold. Denies chest pain, shortness of breath.   Antibiotic has caused some diarrhea.  Recently restarted on eliquis due to recent afib on device.       PAST MEDICAL HISTORY:     Past Medical History:   Diagnosis Date    Arthritis     CAD (coronary artery disease)     Colon cancer (HCC)     Diabetes ([facility])     Diverticular disease of colon 2010    DM (diabetes mellitus) type II controlled, neurological manifestation ([facility]) 2014    Encounter for long-term (current) use of other medications     Hypertension     Mixed hyperlipidemia 2010    pt reports medication was started due to history of DM    Obesity     SYLVIE (obstructive sleep apnea) 2010    Unspecified sleep apnea     O2 AT NIGHT 2L, CANNOT USE CPAP MASK       Past Surgical History:   Procedure Laterality Date    EP DEVICE PROCEDURE N/A 2023    Ablation A-flutter performed by Ananya Ramirez MD at [facility] CARDIAC CATH LAB    EP DEVICE PROCEDURE N/A 10/20/2023    Insert PPM dual performed by Ananya Ramirez MD at [facility] CARDIAC CATH LAB    INVASIVE VASCULAR N/A 10/20/2023    Ultrasound guided vascular access performed by Ananya Ramirez MD at [facility] CARDIAC CATH LAB    KNEE ARTHROSCOPY Right     KNEE ARTHROSCOPY Left     LUMBAR LAMINECTOMY      OTHER SURGICAL HISTORY  1971    CIRCUMCISION, VASECTOMY    TOTAL COLECTOMY  1997    RESECTION    WISDOM TOOTH EXTRACTION               CURRENT MEDICATIONS:    .  Current Outpatient Medications   Medication Sig Dispense Refill    apixaban (ELIQUIS) 5 MG TABS tablet Take 1 tablet by mouth 2 times daily 56 tablet 0    amoxicillin (AMOXIL) 500 MG capsule Take 1 capsule by mouth 3 times daily for 7 days 21 capsule 0    lisinopril (PRINIVIL;ZESTRIL) 10 MG

## 2024-06-07 ENCOUNTER — ANCILLARY PROCEDURE (OUTPATIENT)
Age: 84
End: 2024-06-07
Payer: MEDICARE

## 2024-06-07 ENCOUNTER — OFFICE VISIT (OUTPATIENT)
Age: 84
End: 2024-06-07
Payer: MEDICARE

## 2024-06-07 VITALS
DIASTOLIC BLOOD PRESSURE: 60 MMHG | HEART RATE: 74 BPM | BODY MASS INDEX: 29.77 KG/M2 | SYSTOLIC BLOOD PRESSURE: 138 MMHG | HEIGHT: 69 IN | WEIGHT: 201 LBS

## 2024-06-07 VITALS
SYSTOLIC BLOOD PRESSURE: 138 MMHG | OXYGEN SATURATION: 94 % | BODY MASS INDEX: 29.77 KG/M2 | DIASTOLIC BLOOD PRESSURE: 60 MMHG | WEIGHT: 201 LBS | HEART RATE: 67 BPM | HEIGHT: 69 IN

## 2024-06-07 DIAGNOSIS — G47.33 OSA (OBSTRUCTIVE SLEEP APNEA): ICD-10-CM

## 2024-06-07 DIAGNOSIS — Z79.01 LONG TERM (CURRENT) USE OF ANTICOAGULANTS: ICD-10-CM

## 2024-06-07 DIAGNOSIS — I48.3 TYPICAL ATRIAL FLUTTER (HCC): ICD-10-CM

## 2024-06-07 DIAGNOSIS — E78.2 MIXED HYPERLIPIDEMIA: ICD-10-CM

## 2024-06-07 DIAGNOSIS — I10 ESSENTIAL (PRIMARY) HYPERTENSION: ICD-10-CM

## 2024-06-07 DIAGNOSIS — I48.3 TYPICAL ATRIAL FLUTTER (HCC): Primary | ICD-10-CM

## 2024-06-07 DIAGNOSIS — I25.118 CORONARY ARTERY DISEASE OF NATIVE HEART WITH STABLE ANGINA PECTORIS, UNSPECIFIED VESSEL OR LESION TYPE (HCC): ICD-10-CM

## 2024-06-07 DIAGNOSIS — I50.33 ACUTE ON CHRONIC DIASTOLIC (CONGESTIVE) HEART FAILURE (HCC): ICD-10-CM

## 2024-06-07 DIAGNOSIS — Z79.01 ANTICOAGULATED: ICD-10-CM

## 2024-06-07 DIAGNOSIS — Z95.0 CARDIAC PACEMAKER IN SITU: ICD-10-CM

## 2024-06-07 DIAGNOSIS — I50.32 CHRONIC DIASTOLIC (CONGESTIVE) HEART FAILURE (HCC): ICD-10-CM

## 2024-06-07 PROCEDURE — 99214 OFFICE O/P EST MOD 30 MIN: CPT

## 2024-06-07 PROCEDURE — 93005 ELECTROCARDIOGRAM TRACING: CPT

## 2024-06-07 PROCEDURE — G8428 CUR MEDS NOT DOCUMENT: HCPCS

## 2024-06-07 PROCEDURE — 3078F DIAST BP <80 MM HG: CPT

## 2024-06-07 PROCEDURE — 1123F ACP DISCUSS/DSCN MKR DOCD: CPT

## 2024-06-07 PROCEDURE — 93010 ELECTROCARDIOGRAM REPORT: CPT

## 2024-06-07 PROCEDURE — 3075F SYST BP GE 130 - 139MM HG: CPT

## 2024-06-07 PROCEDURE — 1036F TOBACCO NON-USER: CPT

## 2024-06-07 PROCEDURE — 93308 TTE F-UP OR LMTD: CPT | Performed by: SPECIALIST

## 2024-06-07 PROCEDURE — G8417 CALC BMI ABV UP PARAM F/U: HCPCS

## 2024-06-09 LAB
ECHO AO ASC DIAM: 3.8 CM
ECHO AO ASCENDING AORTA INDEX: 1.84 CM/M2
ECHO AV MEAN GRADIENT: 3 MMHG
ECHO AV MEAN VELOCITY: 0.8 M/S
ECHO AV PEAK GRADIENT: 5 MMHG
ECHO AV PEAK VELOCITY: 1.1 M/S
ECHO AV VELOCITY RATIO: 1
ECHO AV VTI: 22.9 CM
ECHO BSA: 2.11 M2
ECHO EST RA PRESSURE: 3 MMHG
ECHO LA DIAMETER INDEX: 2.46 CM/M2
ECHO LA DIAMETER: 5.1 CM
ECHO LA VOL A-L A2C: 73 ML (ref 18–58)
ECHO LA VOL A-L A4C: 76 ML (ref 18–58)
ECHO LA VOL MOD A2C: 71 ML (ref 18–58)
ECHO LA VOL MOD A4C: 75 ML (ref 18–58)
ECHO LA VOLUME AREA LENGTH: 76 ML
ECHO LA VOLUME INDEX A-L A2C: 35 ML/M2 (ref 16–34)
ECHO LA VOLUME INDEX A-L A4C: 37 ML/M2 (ref 16–34)
ECHO LA VOLUME INDEX AREA LENGTH: 37 ML/M2 (ref 16–34)
ECHO LA VOLUME INDEX MOD A2C: 34 ML/M2 (ref 16–34)
ECHO LA VOLUME INDEX MOD A4C: 36 ML/M2 (ref 16–34)
ECHO LV E' LATERAL VELOCITY: 6 CM/S
ECHO LV E' SEPTAL VELOCITY: 5 CM/S
ECHO LV INTERNAL DIMENSION DIASTOLE INDEX: 2.17 CM/M2
ECHO LV INTERNAL DIMENSION DIASTOLIC: 4.5 CM (ref 4.2–5.9)
ECHO LV IVSD: 1.1 CM (ref 0.6–1)
ECHO LV MASS 2D: 164 G (ref 88–224)
ECHO LV MASS INDEX 2D: 79.2 G/M2 (ref 49–115)
ECHO LV POSTERIOR WALL DIASTOLIC: 1 CM (ref 0.6–1)
ECHO LV RELATIVE WALL THICKNESS RATIO: 0.44
ECHO LVOT AV VTI INDEX: 0.95
ECHO LVOT MEAN GRADIENT: 2 MMHG
ECHO LVOT PEAK GRADIENT: 4 MMHG
ECHO LVOT PEAK VELOCITY: 1.1 M/S
ECHO LVOT VTI: 21.7 CM
ECHO MV A VELOCITY: 0.57 M/S
ECHO MV AREA PHT: 4.7 CM2
ECHO MV E VELOCITY: 0.92 M/S
ECHO MV E/A RATIO: 1.61
ECHO MV E/E' LATERAL: 15.33
ECHO MV E/E' RATIO (AVERAGED): 16.87
ECHO MV E/E' SEPTAL: 18.4
ECHO MV PRESSURE HALF TIME (PHT): 47 MS
ECHO RIGHT VENTRICULAR SYSTOLIC PRESSURE (RVSP): 31 MMHG
ECHO TV REGURGITANT MAX VELOCITY: 2.63 M/S
ECHO TV REGURGITANT PEAK GRADIENT: 28 MMHG

## 2024-06-12 ENCOUNTER — TELEPHONE (OUTPATIENT)
Age: 84
End: 2024-06-12

## 2024-06-12 NOTE — TELEPHONE ENCOUNTER
Returned call to patient- Patient advised of most recent results and voiced understanding.    Per Dr. Mamta Jon: \"can you please let him know echo shows normal function.

## 2024-06-13 NOTE — TELEPHONE ENCOUNTER
[facility] is requesting a short supply to hold patient until mailorder arrives. A 2 weeks supply has been pended.      For Pharmacy Admin Tracking Only    Program: Medication Refill  CPA in place:    Recommendation Provided To:   Intervention Detail: New Rx: 1, reason: Patient Preference  Intervention Accepted By:   Gap Closed?:    Time Spent (min): 5

## 2024-06-18 RX ORDER — GLIPIZIDE 10 MG/1
10 TABLET ORAL 2 TIMES DAILY
Qty: 180 TABLET | Refills: 2 | Status: SHIPPED | OUTPATIENT
Start: 2024-06-18

## 2024-06-24 RX ORDER — GLIPIZIDE 10 MG/1
10 TABLET ORAL 2 TIMES DAILY
Qty: 180 TABLET | Refills: 3 | OUTPATIENT
Start: 2024-06-24

## 2024-06-24 NOTE — TELEPHONE ENCOUNTER
[facility] is requesting a refill. Previous Rx was sent to mailorder.    Last appointment: 6/3/24  Next appointment: 10/3/24    Requested Prescriptions     Pending Prescriptions Disp Refills    glipiZIDE (GLUCOTROL) 10 MG tablet [Pharmacy Med Name: GLIPIZIDE 10 MG TABLET] 180 tablet 3     Sig: TAKE 1 TABLET BY MOUTH TWICE A DAY         For Pharmacy Admin Tracking Only    Program: Medication Refill  CPA in place:    Recommendation Provided To:   Intervention Detail: New Rx: 1, reason: Patient Preference  Intervention Accepted By:   Gap Closed?:    Time Spent (min): 5

## 2024-08-07 ENCOUNTER — TELEPHONE (OUTPATIENT)
Age: 84
End: 2024-08-07

## 2024-08-07 NOTE — TELEPHONE ENCOUNTER
Pt came to IBO to discuss Eliquis, PAF.      Copied financial information, rx insurance card, application.    Provided pt with Watchman brochure.    Called [facility] to ask about OOP pharmacy expense.  They reported Eliquis in April cost $754 for 90 day supply.  They are unable to provide report, but pt could call # on back of insurance card, they can print EOB.    Called local [facility], $10    Called pt, let him know findings and asked him to call insurance card for EOB?

## 2024-08-20 ENCOUNTER — CLINICAL DOCUMENTATION (OUTPATIENT)
Age: 84
End: 2024-08-20

## 2024-09-23 ENCOUNTER — OFFICE VISIT (OUTPATIENT)
Age: 84
End: 2024-09-23
Payer: MEDICARE

## 2024-09-23 VITALS
BODY MASS INDEX: 30.9 KG/M2 | HEIGHT: 69 IN | SYSTOLIC BLOOD PRESSURE: 151 MMHG | HEART RATE: 70 BPM | DIASTOLIC BLOOD PRESSURE: 65 MMHG | RESPIRATION RATE: 18 BRPM | TEMPERATURE: 98.2 F | OXYGEN SATURATION: 96 % | WEIGHT: 208.6 LBS

## 2024-09-23 DIAGNOSIS — N18.31 STAGE 3A CHRONIC KIDNEY DISEASE (HCC): ICD-10-CM

## 2024-09-23 DIAGNOSIS — I10 ESSENTIAL (PRIMARY) HYPERTENSION: ICD-10-CM

## 2024-09-23 DIAGNOSIS — G47.33 OSA (OBSTRUCTIVE SLEEP APNEA): ICD-10-CM

## 2024-09-23 DIAGNOSIS — I25.10 ATHEROSCLEROSIS OF NATIVE CORONARY ARTERY OF NATIVE HEART WITHOUT ANGINA PECTORIS: ICD-10-CM

## 2024-09-23 DIAGNOSIS — E11.21 TYPE 2 DIABETES MELLITUS WITH DIABETIC NEPHROPATHY, WITHOUT LONG-TERM CURRENT USE OF INSULIN (HCC): Primary | ICD-10-CM

## 2024-09-23 DIAGNOSIS — Z23 ENCOUNTER FOR IMMUNIZATION: ICD-10-CM

## 2024-09-23 DIAGNOSIS — E78.2 MIXED HYPERLIPIDEMIA: ICD-10-CM

## 2024-09-23 DIAGNOSIS — Z98.890 S/P ABLATION OF ATRIAL FIBRILLATION: ICD-10-CM

## 2024-09-23 DIAGNOSIS — Z86.79 S/P ABLATION OF ATRIAL FIBRILLATION: ICD-10-CM

## 2024-09-23 LAB
ALBUMIN SERPL-MCNC: 4.1 G/DL (ref 3.5–5)
ALBUMIN/GLOB SERPL: 1.2 (ref 1.1–2.2)
ALP SERPL-CCNC: 84 U/L (ref 45–117)
ALT SERPL-CCNC: 20 U/L (ref 12–78)
ANION GAP SERPL CALC-SCNC: 8 MMOL/L (ref 2–12)
AST SERPL-CCNC: 21 U/L (ref 15–37)
BASOPHILS # BLD: 0.1 K/UL (ref 0–0.1)
BASOPHILS NFR BLD: 1 % (ref 0–1)
BILIRUB SERPL-MCNC: 0.7 MG/DL (ref 0.2–1)
BUN SERPL-MCNC: 32 MG/DL (ref 6–20)
BUN/CREAT SERPL: 29 (ref 12–20)
CALCIUM SERPL-MCNC: 9.6 MG/DL (ref 8.5–10.1)
CHLORIDE SERPL-SCNC: 103 MMOL/L (ref 97–108)
CHOLEST SERPL-MCNC: 134 MG/DL
CO2 SERPL-SCNC: 26 MMOL/L (ref 21–32)
CREAT SERPL-MCNC: 1.09 MG/DL (ref 0.7–1.3)
DIFFERENTIAL METHOD BLD: ABNORMAL
EOSINOPHIL # BLD: 0.9 K/UL (ref 0–0.4)
EOSINOPHIL NFR BLD: 10 % (ref 0–7)
ERYTHROCYTE [DISTWIDTH] IN BLOOD BY AUTOMATED COUNT: 12.9 % (ref 11.5–14.5)
GLOBULIN SER CALC-MCNC: 3.4 G/DL (ref 2–4)
GLUCOSE SERPL-MCNC: 138 MG/DL (ref 65–100)
HBA1C MFR BLD: 7.7 %
HCT VFR BLD AUTO: 43.2 % (ref 36.6–50.3)
HGB BLD-MCNC: 13.6 G/DL (ref 12.1–17)
IMM GRANULOCYTES # BLD AUTO: 0 K/UL (ref 0–0.04)
IMM GRANULOCYTES NFR BLD AUTO: 0 % (ref 0–0.5)
LYMPHOCYTES # BLD: 1.8 K/UL (ref 0.8–3.5)
LYMPHOCYTES NFR BLD: 20 % (ref 12–49)
MCH RBC QN AUTO: 29.7 PG (ref 26–34)
MCHC RBC AUTO-ENTMCNC: 31.5 G/DL (ref 30–36.5)
MCV RBC AUTO: 94.3 FL (ref 80–99)
MONOCYTES # BLD: 0.8 K/UL (ref 0–1)
MONOCYTES NFR BLD: 9 % (ref 5–13)
NEUTS SEG # BLD: 5.6 K/UL (ref 1.8–8)
NEUTS SEG NFR BLD: 60 % (ref 32–75)
NRBC # BLD: 0 K/UL (ref 0–0.01)
NRBC BLD-RTO: 0 PER 100 WBC
PLATELET # BLD AUTO: 134 K/UL (ref 150–400)
PMV BLD AUTO: 12.6 FL (ref 8.9–12.9)
POTASSIUM SERPL-SCNC: 4.2 MMOL/L (ref 3.5–5.1)
PROT SERPL-MCNC: 7.5 G/DL (ref 6.4–8.2)
RBC # BLD AUTO: 4.58 M/UL (ref 4.1–5.7)
SODIUM SERPL-SCNC: 137 MMOL/L (ref 136–145)
WBC # BLD AUTO: 9.3 K/UL (ref 4.1–11.1)

## 2024-09-23 PROCEDURE — 91320 SARSCV2 VAC 30MCG TRS-SUC IM: CPT | Performed by: FAMILY MEDICINE

## 2024-09-23 PROCEDURE — G0008 ADMIN INFLUENZA VIRUS VAC: HCPCS | Performed by: FAMILY MEDICINE

## 2024-09-23 PROCEDURE — PBSHW COVID-19, COMIRNATY, (AGE 12Y+), IM, PF, 30MCG/0.3ML: Performed by: FAMILY MEDICINE

## 2024-09-23 PROCEDURE — 99214 OFFICE O/P EST MOD 30 MIN: CPT | Performed by: FAMILY MEDICINE

## 2024-09-23 PROCEDURE — 83036 HEMOGLOBIN GLYCOSYLATED A1C: CPT | Performed by: FAMILY MEDICINE

## 2024-09-23 PROCEDURE — 3078F DIAST BP <80 MM HG: CPT | Performed by: FAMILY MEDICINE

## 2024-09-23 PROCEDURE — PBSHW INFLUENZA, FLUAD TRIVALENT, (AGE 65 Y+), IM, PRESERVATIVE FREE, 0.5ML: Performed by: FAMILY MEDICINE

## 2024-09-23 PROCEDURE — PBSHW AMB POC HEMOGLOBIN A1C: Performed by: FAMILY MEDICINE

## 2024-09-23 PROCEDURE — G8417 CALC BMI ABV UP PARAM F/U: HCPCS | Performed by: FAMILY MEDICINE

## 2024-09-23 PROCEDURE — 1036F TOBACCO NON-USER: CPT | Performed by: FAMILY MEDICINE

## 2024-09-23 PROCEDURE — 3077F SYST BP >= 140 MM HG: CPT | Performed by: FAMILY MEDICINE

## 2024-09-23 PROCEDURE — 1123F ACP DISCUSS/DSCN MKR DOCD: CPT | Performed by: FAMILY MEDICINE

## 2024-09-23 PROCEDURE — G8427 DOCREV CUR MEDS BY ELIG CLIN: HCPCS | Performed by: FAMILY MEDICINE

## 2024-09-23 ASSESSMENT — PATIENT HEALTH QUESTIONNAIRE - PHQ9
SUM OF ALL RESPONSES TO PHQ QUESTIONS 1-9: 2
2. FEELING DOWN, DEPRESSED OR HOPELESS: SEVERAL DAYS
1. LITTLE INTEREST OR PLEASURE IN DOING THINGS: SEVERAL DAYS
SUM OF ALL RESPONSES TO PHQ QUESTIONS 1-9: 2
SUM OF ALL RESPONSES TO PHQ9 QUESTIONS 1 & 2: 2
SUM OF ALL RESPONSES TO PHQ QUESTIONS 1-9: 2
SUM OF ALL RESPONSES TO PHQ QUESTIONS 1-9: 2

## 2024-09-23 ASSESSMENT — ENCOUNTER SYMPTOMS
ABDOMINAL PAIN: 0
SHORTNESS OF BREATH: 0
ANAL BLEEDING: 0
CHEST TIGHTNESS: 0
WHEEZING: 0
BLOOD IN STOOL: 0

## 2024-10-07 RX ORDER — ATORVASTATIN CALCIUM 40 MG/1
40 TABLET, FILM COATED ORAL DAILY
Qty: 90 TABLET | Refills: 0 | Status: SHIPPED | OUTPATIENT
Start: 2024-10-07

## 2024-10-10 RX ORDER — BLOOD SUGAR DIAGNOSTIC
STRIP MISCELLANEOUS
Qty: 100 STRIP | Refills: 0 | Status: SHIPPED | OUTPATIENT
Start: 2024-10-10

## 2024-10-10 NOTE — TELEPHONE ENCOUNTER
Last appointment: 9/23/24  Next appointment: 1/23/25  Previous refill encounter(s): 2/22/23    Requested Prescriptions     Pending Prescriptions Disp Refills    blood glucose test strips (ONETOUCH ULTRA) strip [Pharmacy Med Name: ONE TOUCH ULTRA BLUE TEST STRP] 100 strip 0     Sig: TEST BLOOD SUGAR ONCE DAILY. Dx E11.49         For Pharmacy Admin Tracking Only    Program: Medication Refill  CPA in place:    Recommendation Provided To:   Intervention Detail: New Rx: 1, reason: Patient Preference  Intervention Accepted By:   Gap Closed?:    Time Spent (min): 5

## 2024-10-18 ENCOUNTER — TELEPHONE (OUTPATIENT)
Age: 84
End: 2024-10-18

## 2024-10-18 NOTE — TELEPHONE ENCOUNTER
Fax received from [employer] about re-enrolling in program for next fermin.  Fax was informing us that pt is conditionally approved for the 2025 assistance program.  Also sent to pt via mail.    [email or web address] to do online (preferred)  [email or web address] to download the application

## 2024-11-07 ENCOUNTER — TELEPHONE (OUTPATIENT)
Age: 84
End: 2024-11-07

## 2024-11-07 NOTE — TELEPHONE ENCOUNTER
RONIM for Pt. Cancelled 02/19 appt. R/S to 03/21, Asked to call if this new date and time didn 't work for him.    Please reschedule if the new date and time for 03/21 doesn't work.

## 2024-11-21 ENCOUNTER — OFFICE VISIT (OUTPATIENT)
Age: 84
End: 2024-11-21
Payer: MEDICARE

## 2024-11-21 VITALS
DIASTOLIC BLOOD PRESSURE: 62 MMHG | OXYGEN SATURATION: 99 % | BODY MASS INDEX: 30.36 KG/M2 | WEIGHT: 205 LBS | HEART RATE: 71 BPM | HEIGHT: 69 IN | SYSTOLIC BLOOD PRESSURE: 120 MMHG

## 2024-11-21 DIAGNOSIS — I48.3 TYPICAL ATRIAL FLUTTER (HCC): ICD-10-CM

## 2024-11-21 DIAGNOSIS — I45.9 HEART BLOCK: ICD-10-CM

## 2024-11-21 DIAGNOSIS — I10 ESSENTIAL HYPERTENSION, BENIGN: ICD-10-CM

## 2024-11-21 DIAGNOSIS — Z79.01 ANTICOAGULATED: ICD-10-CM

## 2024-11-21 DIAGNOSIS — E78.2 MIXED HYPERLIPIDEMIA: ICD-10-CM

## 2024-11-21 DIAGNOSIS — I50.33 ACUTE ON CHRONIC HEART FAILURE WITH PRESERVED EJECTION FRACTION (HCC): ICD-10-CM

## 2024-11-21 DIAGNOSIS — I48.0 PAF (PAROXYSMAL ATRIAL FIBRILLATION) (HCC): Primary | ICD-10-CM

## 2024-11-21 DIAGNOSIS — I45.89 AV NODE DYSFUNCTION: ICD-10-CM

## 2024-11-21 DIAGNOSIS — G47.33 OSA (OBSTRUCTIVE SLEEP APNEA): ICD-10-CM

## 2024-11-21 DIAGNOSIS — I25.118 CORONARY ARTERY DISEASE OF NATIVE HEART WITH STABLE ANGINA PECTORIS, UNSPECIFIED VESSEL OR LESION TYPE (HCC): ICD-10-CM

## 2024-11-21 DIAGNOSIS — I48.0 PAROXYSMAL ATRIAL FIBRILLATION (HCC): ICD-10-CM

## 2024-11-21 DIAGNOSIS — E11.21 TYPE 2 DIABETES WITH NEPHROPATHY (HCC): ICD-10-CM

## 2024-11-21 PROCEDURE — 93005 ELECTROCARDIOGRAM TRACING: CPT | Performed by: INTERNAL MEDICINE

## 2024-11-21 PROCEDURE — 99214 OFFICE O/P EST MOD 30 MIN: CPT | Performed by: INTERNAL MEDICINE

## 2024-11-21 ASSESSMENT — PATIENT HEALTH QUESTIONNAIRE - PHQ9
1. LITTLE INTEREST OR PLEASURE IN DOING THINGS: NOT AT ALL
2. FEELING DOWN, DEPRESSED OR HOPELESS: NOT AT ALL
SUM OF ALL RESPONSES TO PHQ QUESTIONS 1-9: 0
SUM OF ALL RESPONSES TO PHQ9 QUESTIONS 1 & 2: 0
SUM OF ALL RESPONSES TO PHQ QUESTIONS 1-9: 0

## 2024-11-21 NOTE — PATIENT INSTRUCTIONS
Schedule for Watchman implantation next available    For more information regarding Watchman implantation, please visit:  [email or web address]      Your WATCHMAN procedure has been scheduled for 3/24/25 at 130 PM, at [facility].    Please report to Admitting Department by 1130 AM, or 2 hours prior to your scheduled procedure. Please bring a list of your current medications and medication bottles, if able, to the hospital on this day.  You will be unable to drive after your procedure so please make sure to bring someone with you to your procedure.    You will need to have nothing to eat or drink after midnight, the night prior to your procedure. You may have small sips of water, if needed, to take with your medication.    You will also need to see Dr. Ramirez in office prior to your procedure. An appointment has been scheduled for 3/21/25 at 900 AM.    You will need labs drawn prior to your procedure. Please go to have this done after your appointment with Dr. Ramirez.    You will be scheduled for a Chest CTA to map out your pulmonary veins. Please call central scheduling at [phone] to schedule your test at [facility].    You should stop your medication, ELIQUIS AND METFORMIN, THE MORNING OF your scheduled procedure.    After your procedure, you will need to follow up with Jessica Kelsey NP. Your follow-up appointment has been scheduled for 4/11/25 at 1020 AM.

## 2024-11-21 NOTE — PROGRESS NOTES
Cardiac Electrophysiology Office Follow-up    NAME: Parth Rivero   :  1940  MRM:  [id number]    Date:  2024       General Cardiologist: Dr. Jon    Assessment and Plan:     1. PAF (paroxysmal atrial fibrillation) ([facility])  2. Heart block  3. Typical atrial flutter (HCC)  -     EKG 12 Lead  4. AV node dysfunction  5. Acute on chronic heart failure with preserved ejection fraction ([facility])  6. Coronary artery disease of native heart with stable angina pectoris, unspecified vessel or lesion type ([facility])  7. Essential hypertension, benign  8. Type 2 diabetes with nephropathy ([facility])  9. SYLVIE (obstructive sleep apnea)  10. Anticoagulated  11. Mixed hyperlipidemia    Persistent atrial flutter and PAF  History of typical AFL   - S/p typical AFL ablation, EPS also demonstrated AV harmeet disease and prolonged AH interval (424 ms) ([facility] [place])  - now with newly diagnosed AF on PPM with burden of 5%  - patient is on Eliquis 5 mg BID. He's dealing with bruising and high cost of Eliquis, concern about long term therapy on Eliquis relative to the risk of bleeding and cost.  - CHADS2-VASc score of 5 (HTN, DM, CHF, Age>75)  - The patient has non-valvular AF with a CHADS2 score > 2 or [id number] > 3.  The patient is appropriate for short-term treatment with anticoagulation but unsuitable for long-term anticoagulation. We had a shared-decision making discussion about risk of thrombosis from AF and risk of bleeding from anticoagulation using a decision-making tool to calculate their risk and discussed appropriate rationale for Watchman left atrial appendage closure as an alternative. [email or web address].org/tool.html#/tool/[email or web address]. Patient reports full understanding of discussions and recommendations and wish to proceed with the procedure.  - will schedule cardiology FU with Dr. Jon for shared decision making  - schedule for watchman implantation    AV harmeet disease  Significant prolonged AH interval during EP study at the time

## 2024-12-09 ENCOUNTER — HOSPITAL ENCOUNTER (OUTPATIENT)
Facility: HOSPITAL | Age: 84
Discharge: HOME | End: 2024-12-12
Attending: INTERNAL MEDICINE
Payer: MEDICARE

## 2024-12-09 DIAGNOSIS — I50.33 ACUTE ON CHRONIC HEART FAILURE WITH PRESERVED EJECTION FRACTION (HCC): ICD-10-CM

## 2024-12-09 DIAGNOSIS — E11.21 TYPE 2 DIABETES WITH NEPHROPATHY (HCC): ICD-10-CM

## 2024-12-09 DIAGNOSIS — I48.0 PAF (PAROXYSMAL ATRIAL FIBRILLATION) (HCC): ICD-10-CM

## 2024-12-09 DIAGNOSIS — I48.3 TYPICAL ATRIAL FLUTTER (HCC): ICD-10-CM

## 2024-12-09 DIAGNOSIS — I45.9 HEART BLOCK: ICD-10-CM

## 2024-12-09 DIAGNOSIS — E78.2 MIXED HYPERLIPIDEMIA: ICD-10-CM

## 2024-12-09 DIAGNOSIS — I25.118 CORONARY ARTERY DISEASE OF NATIVE HEART WITH STABLE ANGINA PECTORIS, UNSPECIFIED VESSEL OR LESION TYPE (HCC): ICD-10-CM

## 2024-12-09 DIAGNOSIS — Z79.01 ANTICOAGULATED: ICD-10-CM

## 2024-12-09 DIAGNOSIS — I10 ESSENTIAL HYPERTENSION, BENIGN: ICD-10-CM

## 2024-12-09 DIAGNOSIS — G47.33 OSA (OBSTRUCTIVE SLEEP APNEA): ICD-10-CM

## 2024-12-09 DIAGNOSIS — I45.89 AV NODE DYSFUNCTION: ICD-10-CM

## 2024-12-09 PROCEDURE — 71275 CT ANGIOGRAPHY CHEST: CPT

## 2024-12-09 PROCEDURE — 6360000004 HC RX CONTRAST MEDICATION: Performed by: INTERNAL MEDICINE

## 2024-12-09 RX ORDER — IOPAMIDOL 755 MG/ML
100 INJECTION, SOLUTION INTRAVASCULAR
Status: COMPLETED | OUTPATIENT
Start: 2024-12-09 | End: 2024-12-09

## 2024-12-09 RX ADMIN — IOPAMIDOL 100 ML: 755 INJECTION, SOLUTION INTRAVENOUS at 18:11

## 2024-12-12 ENCOUNTER — OFFICE VISIT (OUTPATIENT)
Age: 84
End: 2024-12-12
Payer: MEDICARE

## 2024-12-12 VITALS
SYSTOLIC BLOOD PRESSURE: 124 MMHG | OXYGEN SATURATION: 96 % | WEIGHT: 203 LBS | HEART RATE: 63 BPM | HEIGHT: 69 IN | BODY MASS INDEX: 30.07 KG/M2 | DIASTOLIC BLOOD PRESSURE: 60 MMHG

## 2024-12-12 DIAGNOSIS — I50.32 CHRONIC DIASTOLIC HEART FAILURE (HCC): Primary | ICD-10-CM

## 2024-12-12 DIAGNOSIS — I25.118 CORONARY ARTERY DISEASE OF NATIVE ARTERY OF NATIVE HEART WITH STABLE ANGINA PECTORIS (HCC): ICD-10-CM

## 2024-12-12 DIAGNOSIS — I45.89 AV NODE DYSFUNCTION: ICD-10-CM

## 2024-12-12 DIAGNOSIS — I48.0 PAROXYSMAL ATRIAL FIBRILLATION (HCC): ICD-10-CM

## 2024-12-12 PROCEDURE — 1159F MED LIST DOCD IN RCRD: CPT | Performed by: SPECIALIST

## 2024-12-12 PROCEDURE — 1125F AMNT PAIN NOTED PAIN PRSNT: CPT | Performed by: SPECIALIST

## 2024-12-12 PROCEDURE — 99214 OFFICE O/P EST MOD 30 MIN: CPT | Performed by: SPECIALIST

## 2024-12-12 PROCEDURE — 1123F ACP DISCUSS/DSCN MKR DOCD: CPT | Performed by: SPECIALIST

## 2024-12-12 PROCEDURE — 3078F DIAST BP <80 MM HG: CPT | Performed by: SPECIALIST

## 2024-12-12 PROCEDURE — 1160F RVW MEDS BY RX/DR IN RCRD: CPT | Performed by: SPECIALIST

## 2024-12-12 PROCEDURE — 1036F TOBACCO NON-USER: CPT | Performed by: SPECIALIST

## 2024-12-12 PROCEDURE — G8427 DOCREV CUR MEDS BY ELIG CLIN: HCPCS | Performed by: SPECIALIST

## 2024-12-12 PROCEDURE — G8482 FLU IMMUNIZE ORDER/ADMIN: HCPCS | Performed by: SPECIALIST

## 2024-12-12 PROCEDURE — 3074F SYST BP LT 130 MM HG: CPT | Performed by: SPECIALIST

## 2024-12-12 PROCEDURE — G8417 CALC BMI ABV UP PARAM F/U: HCPCS | Performed by: SPECIALIST

## 2024-12-12 NOTE — PROGRESS NOTES
Chief Complaint   Patient presents with    Follow-up    Atrial Fibrillation     Vitals:    12/12/24 1031   BP: 124/60   Site: Left Upper Arm   Position: Sitting   Cuff Size: Large Adult   Pulse: 63   SpO2: 96%   Weight: 92.1 kg (203 lb)   Height: 1.753 m (5' 9\")       Chest pain NO     ER, urgent care, or hospitalized outside of [facility] since your last visit?  NO     Refills NO   
MG TABS tablet Take 1 tablet by mouth 2 times daily 56 tablet 0    lisinopril (PRINIVIL;ZESTRIL) 10 MG tablet Take 1 tablet by mouth daily 90 tablet 3    bumetanide (BUMEX) 1 MG tablet Take 1 tablet by mouth daily 90 tablet 3    amLODIPine (NORVASC) 10 MG tablet TAKE 1 TABLET BY MOUTH EVERY DAY 90 tablet 3    brimonidine (ALPHAGAN) 0.2 % ophthalmic solution 1 drop into right eye Ophthalmic three times a day for 30 days      dorzolamide-timolol (COSOPT) 2-0.5 % ophthalmic solution       latanoprost (XALATAN) 0.005 % ophthalmic solution INSTILL 1 DROP INTO RIGHT EYE AT BEDTIME      ROCKLATAN 0.02-0.005 % ophthalmic solution       dapagliflozin (FARXIGA) 10 MG tablet Take 1 tablet by mouth daily 90 tablet 3    blood glucose test strips (ASCENSIA AUTODISC VI;ONE TOUCH ULTRA TEST VI) strip by Other route daily      Multiple Vitamins-Minerals (CENTRUM SILVER 50+MEN PO) Take 1 tablet by mouth daily      OXYGEN 2 lpm at night      ferrous sulfate (SLOW FE) 142 (45 Fe) MG extended release tablet daily       No current facility-administered medications for this visit.       Allergies   Allergen Reactions    Hydrocodone-Acetaminophen Other (See Comments)     Nightmares    Oxycodone Hallucinations         SOCIAL HISTORY:     Social History     Tobacco Use    Smoking status: Former     Current packs/day: 0.00     Average packs/day: 1.5 packs/day for 25.0 years (37.5 ttl pk-yrs)     Types: Cigarettes     Start date: 1957     Quit date: 1982     Years since quittin.9     Passive exposure: Never    Smokeless tobacco: Former     Quit date: 2005   Vaping Use    Vaping status: Never Used   Substance Use Topics    Alcohol use: Not Currently     Comment: quit 3 years ago (2020)    Drug use: No         FAMILY HISTORY:     Family History   Problem Relation Age of Onset    Diabetes Father     Heart Disease Father     Stroke Brother     Heart Disease Brother     Other Brother         AAA    Stroke Mother     Heart

## 2025-01-03 RX ORDER — ATORVASTATIN CALCIUM 40 MG/1
40 TABLET, FILM COATED ORAL DAILY
Qty: 90 TABLET | Refills: 3 | Status: SHIPPED | OUTPATIENT
Start: 2025-01-03

## 2025-01-03 NOTE — TELEPHONE ENCOUNTER
Refill per VO of Dr. Jon  Last appt: 12/12/2024    Future Appointments   Date Time Provider Department Center   1/23/2025  9:20 AM Jeremi Bland MD [facility]   3/21/2025  9:00 AM PACEMAKER, STFRANCES [facility] BS AMB   3/21/2025  9:20 AM Ananya Ramirez MD [facility] AMB   3/24/2025  2:30 PM [facility]   4/11/2025 10:20 AM Jessica Kelsey, MEAGAN - NP [facility] BS AMB   6/17/2025  2:00 PM Carlos Jon MD CAV BS AMB       Requested Prescriptions     Signed Prescriptions Disp Refills    atorvastatin (LIPITOR) 40 MG tablet 90 tablet 3     Sig: TAKE 1 TABLET BY MOUTH EVERY DAY     Authorizing Provider: CARLOS JON     Ordering User: PINKY ZAMUDIO

## 2025-01-07 ENCOUNTER — TELEPHONE (OUTPATIENT)
Age: 85
End: 2025-01-07

## 2025-01-07 NOTE — TELEPHONE ENCOUNTER
Patient came to IBO, wanted to confirm how he is taking his eye drops.  Didn't have them with him when he was in the office.  Updated med rec.

## 2025-01-22 NOTE — PROGRESS NOTES
NAME:  Parth Rivero   :   1940   MRN:   [id number]     Date/Time:  2025 8:57 PM  Subjective: f/u dm2 with polyneuropathy,hbp,chol   HPI   Diabetes  He presents for his follow-up diabetic visit. His disease course has been stable. Pertinent negatives for hypoglycemia include no tremors. Pertinent negatives for diabetes include no chest pain, no fatigue, no foot paresthesias, no polyphagia, no polyuria, no visual change and no weakness. There are no hypoglycemic complications. Pertinent negatives for hypoglycemia complications include no blackouts. Diabetic complications include peripheral neuropathy.    Hypertension    The history is provided by the Patient.  This is a chronic problem.  The problem has been gradually worsening. Pertinent negatives include no malaise/fatigue  and no peripheral edema.    Cholesterol Problem   The history is provided by the Patient.  This is a chronic problem.  The problem occurs daily. The problem has been gradually improving .    Follow-up   The history is provided by the Patient. This  is a chronic problem. The problem  occurs daily.  The problem has not changed since onset  Review of Systems   Constitutional:  Negative for fatigue.   HENT:  Negative for congestion.    Respiratory:  Negative for chest tightness.    Cardiovascular:  Negative for chest pain.   Gastrointestinal:  Negative for abdominal distention, abdominal pain and blood in stool.   Musculoskeletal:  Negative for back pain.   Psychiatric/Behavioral:  Negative for agitation.              Medications reviewed:  Current Outpatient Medications   Medication Sig    atorvastatin (LIPITOR) 40 MG tablet TAKE 1 TABLET BY MOUTH EVERY DAY    blood glucose test strips (ONETOUCH ULTRA) strip TEST BLOOD SUGAR ONCE DAILY. Dx E11.49    metFORMIN (GLUCOPHAGE) 850 MG tablet Take 1 tablet by mouth 2 times daily (with meals)    glipiZIDE (GLUCOTROL) 10 MG tablet Take 1 tablet by mouth 2 times daily    apixaban

## 2025-01-23 ENCOUNTER — OFFICE VISIT (OUTPATIENT)
Age: 85
End: 2025-01-23
Payer: MEDICARE

## 2025-01-23 VITALS
DIASTOLIC BLOOD PRESSURE: 64 MMHG | BODY MASS INDEX: 30.54 KG/M2 | WEIGHT: 206.2 LBS | RESPIRATION RATE: 18 BRPM | OXYGEN SATURATION: 94 % | HEART RATE: 71 BPM | TEMPERATURE: 97.8 F | HEIGHT: 69 IN | SYSTOLIC BLOOD PRESSURE: 129 MMHG

## 2025-01-23 DIAGNOSIS — G47.33 OSA (OBSTRUCTIVE SLEEP APNEA): ICD-10-CM

## 2025-01-23 DIAGNOSIS — I48.3 TYPICAL ATRIAL FLUTTER (HCC): ICD-10-CM

## 2025-01-23 DIAGNOSIS — Z98.890 S/P ABLATION OF ATRIAL FIBRILLATION: ICD-10-CM

## 2025-01-23 DIAGNOSIS — I10 ESSENTIAL HYPERTENSION, BENIGN: ICD-10-CM

## 2025-01-23 DIAGNOSIS — Z86.79 S/P ABLATION OF ATRIAL FIBRILLATION: ICD-10-CM

## 2025-01-23 DIAGNOSIS — N18.31 STAGE 3A CHRONIC KIDNEY DISEASE (HCC): ICD-10-CM

## 2025-01-23 DIAGNOSIS — E11.21 TYPE 2 DIABETES MELLITUS WITH DIABETIC NEPHROPATHY, WITHOUT LONG-TERM CURRENT USE OF INSULIN (HCC): Primary | ICD-10-CM

## 2025-01-23 DIAGNOSIS — E78.2 MIXED HYPERLIPIDEMIA: ICD-10-CM

## 2025-01-23 DIAGNOSIS — I48.0 PAROXYSMAL ATRIAL FIBRILLATION (HCC): ICD-10-CM

## 2025-01-23 DIAGNOSIS — Z79.01 ANTICOAGULATED: ICD-10-CM

## 2025-01-23 LAB
ALBUMIN SERPL-MCNC: 4.1 G/DL (ref 3.5–5)
ALBUMIN/GLOB SERPL: 1.3 (ref 1.1–2.2)
ALP SERPL-CCNC: 76 U/L (ref 45–117)
ALT SERPL-CCNC: 17 U/L (ref 12–78)
ANION GAP SERPL CALC-SCNC: 7 MMOL/L (ref 2–12)
AST SERPL-CCNC: 17 U/L (ref 15–37)
BASOPHILS # BLD: 0.05 K/UL (ref 0–0.1)
BASOPHILS NFR BLD: 0.6 % (ref 0–1)
BILIRUB SERPL-MCNC: 0.7 MG/DL (ref 0.2–1)
BUN SERPL-MCNC: 40 MG/DL (ref 6–20)
BUN/CREAT SERPL: 29 (ref 12–20)
CALCIUM SERPL-MCNC: 9.5 MG/DL (ref 8.5–10.1)
CHLORIDE SERPL-SCNC: 103 MMOL/L (ref 97–108)
CHOLEST SERPL-MCNC: 120 MG/DL
CO2 SERPL-SCNC: 27 MMOL/L (ref 21–32)
CREAT SERPL-MCNC: 1.38 MG/DL (ref 0.7–1.3)
DIFFERENTIAL METHOD BLD: ABNORMAL
EOSINOPHIL # BLD: 0.8 K/UL (ref 0–0.4)
EOSINOPHIL NFR BLD: 9.6 % (ref 0–7)
ERYTHROCYTE [DISTWIDTH] IN BLOOD BY AUTOMATED COUNT: 13.8 % (ref 11.5–14.5)
GLOBULIN SER CALC-MCNC: 3.1 G/DL (ref 2–4)
GLUCOSE SERPL-MCNC: 171 MG/DL (ref 65–100)
HBA1C MFR BLD: 8.2 %
HCT VFR BLD AUTO: 44.2 % (ref 36.6–50.3)
HGB BLD-MCNC: 13.5 G/DL (ref 12.1–17)
IMM GRANULOCYTES # BLD AUTO: 0.03 K/UL (ref 0–0.04)
IMM GRANULOCYTES NFR BLD AUTO: 0.4 % (ref 0–0.5)
LYMPHOCYTES # BLD: 1.74 K/UL (ref 0.8–3.5)
LYMPHOCYTES NFR BLD: 20.9 % (ref 12–49)
MCH RBC QN AUTO: 28.4 PG (ref 26–34)
MCHC RBC AUTO-ENTMCNC: 30.5 G/DL (ref 30–36.5)
MCV RBC AUTO: 92.9 FL (ref 80–99)
MONOCYTES # BLD: 0.76 K/UL (ref 0–1)
MONOCYTES NFR BLD: 9.1 % (ref 5–13)
NEUTS SEG # BLD: 4.96 K/UL (ref 1.8–8)
NEUTS SEG NFR BLD: 59.4 % (ref 32–75)
NRBC # BLD: 0 K/UL (ref 0–0.01)
NRBC BLD-RTO: 0 PER 100 WBC
PLATELET # BLD AUTO: 152 K/UL (ref 150–400)
PMV BLD AUTO: 12.8 FL (ref 8.9–12.9)
POTASSIUM SERPL-SCNC: 4.9 MMOL/L (ref 3.5–5.1)
PROT SERPL-MCNC: 7.2 G/DL (ref 6.4–8.2)
RBC # BLD AUTO: 4.76 M/UL (ref 4.1–5.7)
SODIUM SERPL-SCNC: 137 MMOL/L (ref 136–145)
WBC # BLD AUTO: 8.3 K/UL (ref 4.1–11.1)

## 2025-01-23 PROCEDURE — 1159F MED LIST DOCD IN RCRD: CPT | Performed by: FAMILY MEDICINE

## 2025-01-23 PROCEDURE — G8427 DOCREV CUR MEDS BY ELIG CLIN: HCPCS | Performed by: FAMILY MEDICINE

## 2025-01-23 PROCEDURE — 1125F AMNT PAIN NOTED PAIN PRSNT: CPT | Performed by: FAMILY MEDICINE

## 2025-01-23 PROCEDURE — 1036F TOBACCO NON-USER: CPT | Performed by: FAMILY MEDICINE

## 2025-01-23 PROCEDURE — 83036 HEMOGLOBIN GLYCOSYLATED A1C: CPT | Performed by: FAMILY MEDICINE

## 2025-01-23 PROCEDURE — 99213 OFFICE O/P EST LOW 20 MIN: CPT | Performed by: FAMILY MEDICINE

## 2025-01-23 PROCEDURE — PBSHW AMB POC HEMOGLOBIN A1C: Performed by: FAMILY MEDICINE

## 2025-01-23 PROCEDURE — 3078F DIAST BP <80 MM HG: CPT | Performed by: FAMILY MEDICINE

## 2025-01-23 PROCEDURE — G8417 CALC BMI ABV UP PARAM F/U: HCPCS | Performed by: FAMILY MEDICINE

## 2025-01-23 PROCEDURE — 1123F ACP DISCUSS/DSCN MKR DOCD: CPT | Performed by: FAMILY MEDICINE

## 2025-01-23 PROCEDURE — 3074F SYST BP LT 130 MM HG: CPT | Performed by: FAMILY MEDICINE

## 2025-01-23 SDOH — ECONOMIC STABILITY: FOOD INSECURITY: WITHIN THE PAST 12 MONTHS, THE FOOD YOU BOUGHT JUST DIDN'T LAST AND YOU DIDN'T HAVE MONEY TO GET MORE.: NEVER TRUE

## 2025-01-23 SDOH — ECONOMIC STABILITY: FOOD INSECURITY: WITHIN THE PAST 12 MONTHS, YOU WORRIED THAT YOUR FOOD WOULD RUN OUT BEFORE YOU GOT MONEY TO BUY MORE.: NEVER TRUE

## 2025-01-23 ASSESSMENT — PATIENT HEALTH QUESTIONNAIRE - PHQ9
SUM OF ALL RESPONSES TO PHQ QUESTIONS 1-9: 0
2. FEELING DOWN, DEPRESSED OR HOPELESS: NOT AT ALL
1. LITTLE INTEREST OR PLEASURE IN DOING THINGS: NOT AT ALL
SUM OF ALL RESPONSES TO PHQ9 QUESTIONS 1 & 2: 0
SUM OF ALL RESPONSES TO PHQ QUESTIONS 1-9: 0

## 2025-01-23 ASSESSMENT — ENCOUNTER SYMPTOMS
BLOOD IN STOOL: 0
CHEST TIGHTNESS: 0
BACK PAIN: 0
ABDOMINAL PAIN: 0
ABDOMINAL DISTENTION: 0

## 2025-01-23 NOTE — PROGRESS NOTES
Chief Complaint   Patient presents with    Follow-up     Patient is here today for a 4 month follow up.      \"Have you been to the ER, urgent care clinic since your last visit?  Hospitalized since your last visit?\"    12/9/24 [facility] heart ablation and pacemaker put in    “Have you seen or consulted any other health care providers outside of [facility] since your last visit?”    NO            Click Here for Release of Records Request

## 2025-01-28 RX ORDER — GLIPIZIDE 10 MG/1
10 TABLET ORAL 2 TIMES DAILY
Qty: 180 TABLET | Refills: 3 | Status: SHIPPED | OUTPATIENT
Start: 2025-01-28

## 2025-01-28 NOTE — TELEPHONE ENCOUNTER
Last appointment: 1/23/25  Next appointment: 5/23/25  Previous refill encounter(s): 6/18/24 #180 with 2 refills    Requested Prescriptions     Pending Prescriptions Disp Refills    glipiZIDE (GLUCOTROL) 10 MG tablet [Pharmacy Med Name: GLIPIZIDE 10MG TABS] 180 tablet 3     Sig: TAKE ONE TABLET BY MOUTH TWICE A DAY         For Pharmacy Admin Tracking Only    Program: Medication Refill  CPA in place:    Recommendation Provided To:   Intervention Detail: New Rx: 1, reason: Patient Preference  Intervention Accepted By:   Gap Closed?:    Time Spent (min): 5

## 2025-02-08 DIAGNOSIS — I10 ESSENTIAL (PRIMARY) HYPERTENSION: ICD-10-CM

## 2025-02-10 RX ORDER — AMLODIPINE BESYLATE 10 MG/1
10 TABLET ORAL DAILY
Qty: 90 TABLET | Refills: 1 | Status: SHIPPED | OUTPATIENT
Start: 2025-02-10

## 2025-02-11 ENCOUNTER — TELEPHONE (OUTPATIENT)
Age: 85
End: 2025-02-11

## 2025-02-11 DIAGNOSIS — I10 ESSENTIAL (PRIMARY) HYPERTENSION: ICD-10-CM

## 2025-02-11 RX ORDER — BUMETANIDE 1 MG/1
1 TABLET ORAL DAILY
Qty: 90 TABLET | Refills: 3 | Status: SHIPPED | OUTPATIENT
Start: 2025-02-11

## 2025-02-11 NOTE — TELEPHONE ENCOUNTER
Patient came to office to discuss PAF for Eliquis.  Provided his taxes to submit.      Faxed info to [employer].

## 2025-02-11 NOTE — TELEPHONE ENCOUNTER
Refill per VO of Dr. Jon  Last appt: 12/12/2024    Future Appointments   Date Time Provider Department Center   3/21/2025  9:00 AM PACEMAKER, STFRANCES [facility] BS AMB   3/21/2025  9:20 AM Ananya Ramirez MD [facility] BS AMB   3/24/2025  2:30 PM [facility] CATH LAB 3 [facility]   4/11/2025 10:20 AM Jessica Kelsey APRN - NP [facility] BS AMB   5/23/2025 10:00 AM Jeremi Bland MD [facility] DEP   6/17/2025  2:00 PM Carlos Jon MD CAV BS AMB       Requested Prescriptions     Signed Prescriptions Disp Refills    bumetanide (BUMEX) 1 MG tablet 90 tablet 3     Sig: TAKE ONE TABLET BY MOUTH EVERY DAY     Authorizing Provider: CARLOS JON     Ordering User: GRIFFIN RASCON

## 2025-02-23 PROCEDURE — 93294 REM INTERROG EVL PM/LDLS PM: CPT | Performed by: INTERNAL MEDICINE

## 2025-03-03 NOTE — TELEPHONE ENCOUNTER
Last appointment: 1/23/25  Next appointment: 5/23/25  Previous refill encounter(s): 6/18/24 #180 with 2 refills    Requested Prescriptions     Pending Prescriptions Disp Refills    metFORMIN (GLUCOPHAGE) 850 MG tablet [Pharmacy Med Name: METFORMIN HCL 850MG TABS] 180 tablet 3     Sig: TAKE ONE TABLET BY MOUTH TWICE A DAY WITH MEALS         For Pharmacy Admin Tracking Only    Program: Medication Refill  CPA in place:    Recommendation Provided To:   Intervention Detail: New Rx: 1, reason: Patient Preference  Intervention Accepted By:   Gap Closed?:    Time Spent (min): 5

## 2025-03-07 ENCOUNTER — TELEPHONE (OUTPATIENT)
Age: 85
End: 2025-03-07

## 2025-03-07 NOTE — TELEPHONE ENCOUNTER
Pt came to office Tue, 3/4/25 asking about the [employer] PAF application.    Called [phone],   Caro ALLEN-[phone]  Pt portion not yet received; only provider portion, 0400.  Need page 2-3.

## 2025-03-13 ENCOUNTER — TELEPHONE (OUTPATIENT)
Age: 85
End: 2025-03-13

## 2025-03-13 NOTE — TELEPHONE ENCOUNTER
Refill per VO of Dr. Carlos Jon  Last appt: 12/12/2024    Future Appointments   Date Time Provider Department Center   3/21/2025  9:00 AM PACEMAKER, STFRBONITA [facility] BS AMB   3/21/2025  9:20 AM Ananya Ramirez MD [facility] BS AMB   3/24/2025  2:30 PM [facility] CATH LAB 3 [facility]   4/11/2025 10:20 AM Jessica Kelsey APRN - NP [facility] BS AMB   5/23/2025 10:00 AM Jeremi Bland MD [facility]   6/17/2025  2:00 PM Carlos Jon MD [facility] BS AMB       Requested Prescriptions     Signed Prescriptions Disp Refills    apixaban (ELIQUIS) 5 MG TABS tablet 180 tablet 3     Sig: Take 1 tablet by mouth 2 times daily     Authorizing Provider: CARLOS JON     Ordering User: PINKY ZAMUDIO

## 2025-03-13 NOTE — TELEPHONE ENCOUNTER
Patient needs eliquis refill and authorization through [facility] and his best contact is [phone]

## 2025-03-18 ENCOUNTER — TELEPHONE (OUTPATIENT)
Age: 85
End: 2025-03-18

## 2025-03-18 NOTE — TELEPHONE ENCOUNTER
Telephone Call to Patient. ID verified using two patient identifiers.     Patient advised we can get his labs when he comes in for his appointment on Friday   Advised of appointment's  for Pacemaker Check and follow up with .     Last appt: 12/12/2024  Future Appointments   Date Time Provider Department Center   3/21/2025  9:00 AM PACEMAKER, STFRANCES [facility] AMB   3/21/2025  9:20 AM Ananya Ramirez MD [facility] BS AMB   3/24/2025  2:30 PM [facility] CATH LAB 3 [facility]   4/11/2025 10:20 AM Jessica Kelsey APRN - NP [facility] BS AMB   5/23/2025 10:00 AM Jeremi Bland MD [facility]   6/17/2025  2:00 PM Mamta Jon MD C[facility] BS AMB

## 2025-03-18 NOTE — TELEPHONE ENCOUNTER
Pt called in,    Asking if labs are needed prior to Watchman next week and where he should go to get labs drawn.  Noted no active lab orders in Epic.  Will fwd to EP team.    Ph: [phone]

## 2025-03-19 ENCOUNTER — PREP FOR PROCEDURE (OUTPATIENT)
Age: 85
End: 2025-03-19

## 2025-03-19 RX ORDER — SODIUM CHLORIDE 0.9 % (FLUSH) 0.9 %
5-40 SYRINGE (ML) INJECTION PRN
Status: CANCELLED | OUTPATIENT
Start: 2025-03-19

## 2025-03-19 RX ORDER — SODIUM CHLORIDE 9 MG/ML
INJECTION, SOLUTION INTRAVENOUS PRN
Status: CANCELLED | OUTPATIENT
Start: 2025-03-19

## 2025-03-19 RX ORDER — SODIUM CHLORIDE 0.9 % (FLUSH) 0.9 %
5-40 SYRINGE (ML) INJECTION EVERY 12 HOURS SCHEDULED
Status: CANCELLED | OUTPATIENT
Start: 2025-03-19

## 2025-03-21 ENCOUNTER — PROCEDURE VISIT (OUTPATIENT)
Age: 85
End: 2025-03-21
Payer: MEDICARE

## 2025-03-21 ENCOUNTER — OFFICE VISIT (OUTPATIENT)
Age: 85
End: 2025-03-21
Payer: MEDICARE

## 2025-03-21 ENCOUNTER — PREP FOR PROCEDURE (OUTPATIENT)
Age: 85
End: 2025-03-21

## 2025-03-21 VITALS
RESPIRATION RATE: 17 BRPM | DIASTOLIC BLOOD PRESSURE: 70 MMHG | BODY MASS INDEX: 30.72 KG/M2 | HEART RATE: 67 BPM | WEIGHT: 208 LBS | SYSTOLIC BLOOD PRESSURE: 120 MMHG | OXYGEN SATURATION: 92 %

## 2025-03-21 DIAGNOSIS — I50.33 ACUTE ON CHRONIC HEART FAILURE WITH PRESERVED EJECTION FRACTION (HCC): ICD-10-CM

## 2025-03-21 DIAGNOSIS — I25.118 CORONARY ARTERY DISEASE OF NATIVE HEART WITH STABLE ANGINA PECTORIS, UNSPECIFIED VESSEL OR LESION TYPE: ICD-10-CM

## 2025-03-21 DIAGNOSIS — Z79.01 ANTICOAGULATED: ICD-10-CM

## 2025-03-21 DIAGNOSIS — I48.3 TYPICAL ATRIAL FLUTTER (HCC): ICD-10-CM

## 2025-03-21 DIAGNOSIS — G47.33 OSA (OBSTRUCTIVE SLEEP APNEA): ICD-10-CM

## 2025-03-21 DIAGNOSIS — I45.9 HEART BLOCK: ICD-10-CM

## 2025-03-21 DIAGNOSIS — I10 ESSENTIAL HYPERTENSION, BENIGN: ICD-10-CM

## 2025-03-21 DIAGNOSIS — E78.2 MIXED HYPERLIPIDEMIA: ICD-10-CM

## 2025-03-21 DIAGNOSIS — I45.89 AV NODE DYSFUNCTION: ICD-10-CM

## 2025-03-21 DIAGNOSIS — Z95.0 CARDIAC PACEMAKER IN SITU: Primary | ICD-10-CM

## 2025-03-21 DIAGNOSIS — E11.3299 DIABETIC RETINOPATHY, BACKGROUND (HCC): ICD-10-CM

## 2025-03-21 DIAGNOSIS — I48.0 PAROXYSMAL ATRIAL FIBRILLATION (HCC): Primary | ICD-10-CM

## 2025-03-21 DIAGNOSIS — N18.31 STAGE 3A CHRONIC KIDNEY DISEASE (HCC): ICD-10-CM

## 2025-03-21 PROCEDURE — 99214 OFFICE O/P EST MOD 30 MIN: CPT | Performed by: INTERNAL MEDICINE

## 2025-03-21 PROCEDURE — 3052F HG A1C>EQUAL 8.0%<EQUAL 9.0%: CPT | Performed by: INTERNAL MEDICINE

## 2025-03-21 PROCEDURE — 1160F RVW MEDS BY RX/DR IN RCRD: CPT | Performed by: INTERNAL MEDICINE

## 2025-03-21 PROCEDURE — 1159F MED LIST DOCD IN RCRD: CPT | Performed by: INTERNAL MEDICINE

## 2025-03-21 PROCEDURE — 1126F AMNT PAIN NOTED NONE PRSNT: CPT | Performed by: INTERNAL MEDICINE

## 2025-03-21 PROCEDURE — 1036F TOBACCO NON-USER: CPT | Performed by: INTERNAL MEDICINE

## 2025-03-21 PROCEDURE — G2211 COMPLEX E/M VISIT ADD ON: HCPCS | Performed by: INTERNAL MEDICINE

## 2025-03-21 PROCEDURE — G8417 CALC BMI ABV UP PARAM F/U: HCPCS | Performed by: INTERNAL MEDICINE

## 2025-03-21 PROCEDURE — 1123F ACP DISCUSS/DSCN MKR DOCD: CPT | Performed by: INTERNAL MEDICINE

## 2025-03-21 PROCEDURE — 93280 PM DEVICE PROGR EVAL DUAL: CPT | Performed by: INTERNAL MEDICINE

## 2025-03-21 PROCEDURE — G8427 DOCREV CUR MEDS BY ELIG CLIN: HCPCS | Performed by: INTERNAL MEDICINE

## 2025-03-21 PROCEDURE — 3078F DIAST BP <80 MM HG: CPT | Performed by: INTERNAL MEDICINE

## 2025-03-21 PROCEDURE — 3074F SYST BP LT 130 MM HG: CPT | Performed by: INTERNAL MEDICINE

## 2025-03-21 NOTE — PATIENT INSTRUCTIONS
remainder of the day.  Do not lift anything greater than 10 pounds for 7 days.  Limit bending at the puncture site and use of stairs for at least 2 days.  You may remove the bandage and shower the morning after the procedure.  Do not take a bath for 3 days.      SYMPTOMS THAT NEED TO BE REPORTED IMMEDIATELY     Bleeding at the puncture site.  If there is bleeding, lie down and hold firm direct pressure for at least 5 minutes.  If the bleeding does not stop, go to the closest emergency room, or call 911.    Temperature more than 100.5 F.  Redness or warmth at the puncture site.  Increasing pain, numbness, coolness or blue discoloration of the extremity where the puncture is located.  Pulsating mass at the puncture site.  A new “lump” at the puncture site, or increasing swelling at the site.  Bruising at the puncture site that enlarges or becomes painful (some bruising at the site is common and will go away in 7 - 10 days).  Dizziness, lightheadedness, fainting.    REMEMBER: If you feel something is an emergency or cannot be handled over the phone, call 911 or go to the closest emergency room.      FOLLOW UP CARE     Follow up with NP in 2 weeks.  NELIDA (transesophageal echocardiogram) 45 days after Watchman - this will be scheduled at your follow up appointment  Follow up with Dr. Ramirez/NP after NELIDA          Warren Ramirez MD  Cardiac Electrophysiology / Cardiology    [facility] [place]  [facility]  [address] [address], [address]   [address] [place] [address], [address]  [place], [place]  [id number] [place] [address]  [address] [phone] / [phone] Fax  [phone] / [phone] Fax

## 2025-03-21 NOTE — PROGRESS NOTES
had concerns including Atrial Fibrillation.    Vitals:    03/21/25 0846   BP: 120/70   BP Site: Left Upper Arm   Patient Position: Sitting   Pulse: 67   Resp: 17   SpO2: 92%   Weight: 94.3 kg (208 lb)        Chest pain No    Refills No        1. Have you been to the ER, urgent care clinic since your last visit? No       Hospitalized since your last visit? No       Where?        Date?  
during the stress test.    ECG: Stress ECG was negative for ischemia.    Perfusion Comments: LV perfusion is probably normal. There is a low grade, fixed, apical defect without ischemia.    Stress Function: Left ventricular function post-stress is normal. Post-stress ejection fraction is 55%. The stress end diastolic cavity size is normal.    Signed by: Mamta Jon MD on 9/13/2022  7:12 PM, Signed by: Unknown Provider Result on 9/13/2022 12:00 AM    [unfilled]   No specialty comments available.      Lab Review:     Lab Results   Component Value Date/Time    CHOL 120 01/23/2025 09:49 AM    HDL 37 02/02/2024 10:03 AM    LDL 52.8 02/02/2024 10:03 AM     No results found for: \"KAREN\", \"CREAPOC\"  Lab Results   Component Value Date/Time    BUN 40 01/23/2025 09:49 AM     Lab Results   Component Value Date/Time    K 4.9 01/23/2025 09:49 AM     No results found for: \"HBA1C\"  Lab Results   Component Value Date/Time    HGB 13.5 01/23/2025 09:49 AM     Lab Results   Component Value Date/Time     01/23/2025 09:49 AM     No results for input(s): \"CPK\", \"CKMB\" in the last 72 hours.    Invalid input(s): \"CKQMB\", \"CPKMB\", \"TROIQ\"             ___________________________________________________    An Warren Ramirez MD    Cardiac Electrophysiology  [facility]   [address] [facility]. [address] [address] [phone]

## 2025-03-24 ENCOUNTER — ANESTHESIA EVENT (OUTPATIENT)
Facility: HOSPITAL | Age: 85
DRG: 274 | End: 2025-03-24
Payer: MEDICARE

## 2025-03-24 ENCOUNTER — APPOINTMENT (OUTPATIENT)
Facility: HOSPITAL | Age: 85
DRG: 274 | End: 2025-03-24
Attending: INTERNAL MEDICINE
Payer: MEDICARE

## 2025-03-24 ENCOUNTER — HOSPITAL ENCOUNTER (INPATIENT)
Facility: HOSPITAL | Age: 85
LOS: 1 days | Discharge: HOME | DRG: 274 | End: 2025-03-24
Attending: INTERNAL MEDICINE | Admitting: INTERNAL MEDICINE
Payer: MEDICARE

## 2025-03-24 ENCOUNTER — ANESTHESIA (OUTPATIENT)
Facility: HOSPITAL | Age: 85
DRG: 274 | End: 2025-03-24
Payer: MEDICARE

## 2025-03-24 VITALS
BODY MASS INDEX: 29.47 KG/M2 | RESPIRATION RATE: 19 BRPM | OXYGEN SATURATION: 92 % | HEART RATE: 76 BPM | DIASTOLIC BLOOD PRESSURE: 62 MMHG | TEMPERATURE: 98.5 F | SYSTOLIC BLOOD PRESSURE: 136 MMHG | HEIGHT: 69 IN | WEIGHT: 199 LBS

## 2025-03-24 DIAGNOSIS — I48.0 PAROXYSMAL ATRIAL FIBRILLATION (HCC): ICD-10-CM

## 2025-03-24 PROBLEM — Z95.818 PRESENCE OF WATCHMAN LEFT ATRIAL APPENDAGE CLOSURE DEVICE: Status: ACTIVE | Noted: 2025-03-24

## 2025-03-24 LAB
ABO + RH BLD: NORMAL
ANION GAP SERPL CALC-SCNC: 6 MMOL/L (ref 2–12)
BASOPHILS # BLD: 0.06 K/UL (ref 0–0.1)
BASOPHILS NFR BLD: 0.6 % (ref 0–1)
BLOOD GROUP ANTIBODIES SERPL: NORMAL
BUN SERPL-MCNC: 32 MG/DL (ref 6–20)
BUN/CREAT SERPL: 25 (ref 12–20)
CALCIUM SERPL-MCNC: 9.8 MG/DL (ref 8.5–10.1)
CHLORIDE SERPL-SCNC: 105 MMOL/L (ref 97–108)
CO2 SERPL-SCNC: 25 MMOL/L (ref 21–32)
CREAT SERPL-MCNC: 1.29 MG/DL (ref 0.7–1.3)
DIFFERENTIAL METHOD BLD: ABNORMAL
ECHO BSA: 2.1 M2
ECHO BSA: 2.1 M2
EOSINOPHIL # BLD: 1.13 K/UL (ref 0–0.4)
EOSINOPHIL NFR BLD: 11.2 % (ref 0–7)
ERYTHROCYTE [DISTWIDTH] IN BLOOD BY AUTOMATED COUNT: 13.6 % (ref 11.5–14.5)
GLUCOSE SERPL-MCNC: 172 MG/DL (ref 65–100)
HCT VFR BLD AUTO: 43.4 % (ref 36.6–50.3)
HGB BLD-MCNC: 13.6 G/DL (ref 12.1–17)
IMM GRANULOCYTES # BLD AUTO: 0.04 K/UL (ref 0–0.04)
IMM GRANULOCYTES NFR BLD AUTO: 0.4 % (ref 0–0.5)
LYMPHOCYTES # BLD: 1.61 K/UL (ref 0.8–3.5)
LYMPHOCYTES NFR BLD: 15.9 % (ref 12–49)
MCH RBC QN AUTO: 28.8 PG (ref 26–34)
MCHC RBC AUTO-ENTMCNC: 31.3 G/DL (ref 30–36.5)
MCV RBC AUTO: 91.9 FL (ref 80–99)
MONOCYTES # BLD: 0.88 K/UL (ref 0–1)
MONOCYTES NFR BLD: 8.7 % (ref 5–13)
NEUTS SEG # BLD: 6.38 K/UL (ref 1.8–8)
NEUTS SEG NFR BLD: 63.2 % (ref 32–75)
NRBC # BLD: 0 K/UL (ref 0–0.01)
NRBC BLD-RTO: 0 PER 100 WBC
PLATELET # BLD AUTO: 164 K/UL (ref 150–400)
PMV BLD AUTO: 12.2 FL (ref 8.9–12.9)
POTASSIUM SERPL-SCNC: 4.6 MMOL/L (ref 3.5–5.1)
RBC # BLD AUTO: 4.72 M/UL (ref 4.1–5.7)
RBC MORPH BLD: ABNORMAL
SODIUM SERPL-SCNC: 136 MMOL/L (ref 136–145)
SPECIMEN EXP DATE BLD: NORMAL
WBC # BLD AUTO: 10.1 K/UL (ref 4.1–11.1)

## 2025-03-24 PROCEDURE — 5A2204Z RESTORATION OF CARDIAC RHYTHM, SINGLE: ICD-10-PCS | Performed by: INTERNAL MEDICINE

## 2025-03-24 PROCEDURE — 2580000003 HC RX 258: Performed by: NURSE ANESTHETIST, CERTIFIED REGISTERED

## 2025-03-24 PROCEDURE — C1889 IMPLANT/INSERT DEVICE, NOC: HCPCS | Performed by: INTERNAL MEDICINE

## 2025-03-24 PROCEDURE — 93662 INTRACARDIAC ECG (ICE): CPT | Performed by: INTERNAL MEDICINE

## 2025-03-24 PROCEDURE — 6360000004 HC RX CONTRAST MEDICATION: Performed by: INTERNAL MEDICINE

## 2025-03-24 PROCEDURE — 86901 BLOOD TYPING SEROLOGIC RH(D): CPT

## 2025-03-24 PROCEDURE — 33340 PERQ CLSR TCAT L ATR APNDGE: CPT | Performed by: INTERNAL MEDICINE

## 2025-03-24 PROCEDURE — B24BZZ4 ULTRASONOGRAPHY OF HEART WITH AORTA, TRANSESOPHAGEAL: ICD-10-PCS | Performed by: INTERNAL MEDICINE

## 2025-03-24 PROCEDURE — 86850 RBC ANTIBODY SCREEN: CPT

## 2025-03-24 PROCEDURE — 6360000002 HC RX W HCPCS: Performed by: NURSE ANESTHETIST, CERTIFIED REGISTERED

## 2025-03-24 PROCEDURE — 71045 X-RAY EXAM CHEST 1 VIEW: CPT

## 2025-03-24 PROCEDURE — 1100000003 HC PRIVATE W/ TELEMETRY

## 2025-03-24 PROCEDURE — C1759 CATH, INTRA ECHOCARDIOGRAPHY: HCPCS | Performed by: INTERNAL MEDICINE

## 2025-03-24 PROCEDURE — 80048 BASIC METABOLIC PNL TOTAL CA: CPT

## 2025-03-24 PROCEDURE — C1760 CLOSURE DEV, VASC: HCPCS | Performed by: INTERNAL MEDICINE

## 2025-03-24 PROCEDURE — 85347 COAGULATION TIME ACTIVATED: CPT

## 2025-03-24 PROCEDURE — 3700000000 HC ANESTHESIA ATTENDED CARE: Performed by: INTERNAL MEDICINE

## 2025-03-24 PROCEDURE — 76937 US GUIDE VASCULAR ACCESS: CPT | Performed by: INTERNAL MEDICINE

## 2025-03-24 PROCEDURE — 85025 COMPLETE CBC W/AUTO DIFF WBC: CPT

## 2025-03-24 PROCEDURE — C1769 GUIDE WIRE: HCPCS | Performed by: INTERNAL MEDICINE

## 2025-03-24 PROCEDURE — 3E033XZ INTRODUCTION OF VASOPRESSOR INTO PERIPHERAL VEIN, PERCUTANEOUS APPROACH: ICD-10-PCS | Performed by: INTERNAL MEDICINE

## 2025-03-24 PROCEDURE — 86900 BLOOD TYPING SEROLOGIC ABO: CPT

## 2025-03-24 PROCEDURE — 02L73DK OCCLUSION OF LEFT ATRIAL APPENDAGE WITH INTRALUMINAL DEVICE, PERCUTANEOUS APPROACH: ICD-10-PCS | Performed by: INTERNAL MEDICINE

## 2025-03-24 PROCEDURE — 3700000001 HC ADD 15 MINUTES (ANESTHESIA): Performed by: INTERNAL MEDICINE

## 2025-03-24 PROCEDURE — 2500000003 HC RX 250 WO HCPCS: Performed by: NURSE ANESTHETIST, CERTIFIED REGISTERED

## 2025-03-24 PROCEDURE — 2709999900 HC NON-CHARGEABLE SUPPLY: Performed by: INTERNAL MEDICINE

## 2025-03-24 PROCEDURE — C1894 INTRO/SHEATH, NON-LASER: HCPCS | Performed by: INTERNAL MEDICINE

## 2025-03-24 DEVICE — LEFT ATRIAL APPENDAGE CLOSURE DEVICE WITH DELIVERY SYSTEM
Type: IMPLANTABLE DEVICE | Status: FUNCTIONAL
Brand: WATCHMAN FLX™ PRO

## 2025-03-24 RX ORDER — CEFAZOLIN SODIUM 1 G/3ML
INJECTION, POWDER, FOR SOLUTION INTRAMUSCULAR; INTRAVENOUS
Status: DISCONTINUED | OUTPATIENT
Start: 2025-03-24 | End: 2025-03-24 | Stop reason: SDUPTHER

## 2025-03-24 RX ORDER — SODIUM CHLORIDE 0.9 % (FLUSH) 0.9 %
5-40 SYRINGE (ML) INJECTION EVERY 12 HOURS SCHEDULED
Status: DISCONTINUED | OUTPATIENT
Start: 2025-03-24 | End: 2025-03-24 | Stop reason: HOSPADM

## 2025-03-24 RX ORDER — SODIUM CHLORIDE 0.9 % (FLUSH) 0.9 %
5-40 SYRINGE (ML) INJECTION PRN
Status: DISCONTINUED | OUTPATIENT
Start: 2025-03-24 | End: 2025-03-24 | Stop reason: HOSPADM

## 2025-03-24 RX ORDER — SODIUM CHLORIDE 9 MG/ML
INJECTION, SOLUTION INTRAVENOUS PRN
Status: DISCONTINUED | OUTPATIENT
Start: 2025-03-24 | End: 2025-03-24 | Stop reason: HOSPADM

## 2025-03-24 RX ORDER — HYDRALAZINE HYDROCHLORIDE 20 MG/ML
10 INJECTION INTRAMUSCULAR; INTRAVENOUS
Status: DISCONTINUED | OUTPATIENT
Start: 2025-03-24 | End: 2025-03-24 | Stop reason: HOSPADM

## 2025-03-24 RX ORDER — IOPAMIDOL 755 MG/ML
INJECTION, SOLUTION INTRAVASCULAR PRN
Status: DISCONTINUED | OUTPATIENT
Start: 2025-03-24 | End: 2025-03-24 | Stop reason: HOSPADM

## 2025-03-24 RX ORDER — PROCHLORPERAZINE EDISYLATE 5 MG/ML
5 INJECTION INTRAMUSCULAR; INTRAVENOUS
Status: DISCONTINUED | OUTPATIENT
Start: 2025-03-24 | End: 2025-03-24 | Stop reason: HOSPADM

## 2025-03-24 RX ORDER — FENTANYL CITRATE 50 UG/ML
50 INJECTION, SOLUTION INTRAMUSCULAR; INTRAVENOUS EVERY 5 MIN PRN
Status: DISCONTINUED | OUTPATIENT
Start: 2025-03-24 | End: 2025-03-24 | Stop reason: HOSPADM

## 2025-03-24 RX ORDER — MIDAZOLAM HYDROCHLORIDE 2 MG/2ML
2 INJECTION, SOLUTION INTRAMUSCULAR; INTRAVENOUS
Status: DISCONTINUED | OUTPATIENT
Start: 2025-03-24 | End: 2025-03-24 | Stop reason: HOSPADM

## 2025-03-24 RX ORDER — SODIUM CHLORIDE, SODIUM LACTATE, POTASSIUM CHLORIDE, CALCIUM CHLORIDE 600; 310; 30; 20 MG/100ML; MG/100ML; MG/100ML; MG/100ML
INJECTION, SOLUTION INTRAVENOUS CONTINUOUS
Status: DISCONTINUED | OUTPATIENT
Start: 2025-03-24 | End: 2025-03-24 | Stop reason: HOSPADM

## 2025-03-24 RX ORDER — PROTAMINE SULFATE 10 MG/ML
INJECTION, SOLUTION INTRAVENOUS
Status: DISCONTINUED | OUTPATIENT
Start: 2025-03-24 | End: 2025-03-24 | Stop reason: SDUPTHER

## 2025-03-24 RX ORDER — HYDROMORPHONE HYDROCHLORIDE 1 MG/ML
0.5 INJECTION, SOLUTION INTRAMUSCULAR; INTRAVENOUS; SUBCUTANEOUS EVERY 5 MIN PRN
Status: DISCONTINUED | OUTPATIENT
Start: 2025-03-24 | End: 2025-03-24 | Stop reason: HOSPADM

## 2025-03-24 RX ORDER — DEXAMETHASONE SODIUM PHOSPHATE 4 MG/ML
INJECTION, SOLUTION INTRA-ARTICULAR; INTRALESIONAL; INTRAMUSCULAR; INTRAVENOUS; SOFT TISSUE
Status: DISCONTINUED | OUTPATIENT
Start: 2025-03-24 | End: 2025-03-24 | Stop reason: SDUPTHER

## 2025-03-24 RX ORDER — ONDANSETRON 2 MG/ML
INJECTION INTRAMUSCULAR; INTRAVENOUS
Status: DISCONTINUED | OUTPATIENT
Start: 2025-03-24 | End: 2025-03-24 | Stop reason: SDUPTHER

## 2025-03-24 RX ORDER — ONDANSETRON 2 MG/ML
4 INJECTION INTRAMUSCULAR; INTRAVENOUS EVERY 6 HOURS PRN
Status: DISCONTINUED | OUTPATIENT
Start: 2025-03-24 | End: 2025-03-24 | Stop reason: HOSPADM

## 2025-03-24 RX ORDER — DIPHENHYDRAMINE HYDROCHLORIDE 50 MG/ML
12.5 INJECTION, SOLUTION INTRAMUSCULAR; INTRAVENOUS
Status: DISCONTINUED | OUTPATIENT
Start: 2025-03-24 | End: 2025-03-24 | Stop reason: HOSPADM

## 2025-03-24 RX ORDER — LIDOCAINE HYDROCHLORIDE 20 MG/ML
INJECTION, SOLUTION EPIDURAL; INFILTRATION; INTRACAUDAL; PERINEURAL
Status: DISCONTINUED | OUTPATIENT
Start: 2025-03-24 | End: 2025-03-24 | Stop reason: SDUPTHER

## 2025-03-24 RX ORDER — PHENYLEPHRINE HCL IN 0.9% NACL 0.4MG/10ML
SYRINGE (ML) INTRAVENOUS
Status: DISCONTINUED | OUTPATIENT
Start: 2025-03-24 | End: 2025-03-24 | Stop reason: SDUPTHER

## 2025-03-24 RX ORDER — MIDAZOLAM HYDROCHLORIDE 5 MG/5ML
5 INJECTION, SOLUTION INTRAMUSCULAR; INTRAVENOUS
Status: DISCONTINUED | OUTPATIENT
Start: 2025-03-24 | End: 2025-03-24 | Stop reason: HOSPADM

## 2025-03-24 RX ORDER — ONDANSETRON 4 MG/1
4 TABLET, ORALLY DISINTEGRATING ORAL EVERY 8 HOURS PRN
Status: DISCONTINUED | OUTPATIENT
Start: 2025-03-24 | End: 2025-03-24 | Stop reason: HOSPADM

## 2025-03-24 RX ORDER — ASPIRIN 81 MG/1
81 TABLET ORAL DAILY
COMMUNITY

## 2025-03-24 RX ORDER — SODIUM CHLORIDE 9 MG/ML
INJECTION, SOLUTION INTRAVENOUS
Status: DISCONTINUED | OUTPATIENT
Start: 2025-03-24 | End: 2025-03-24 | Stop reason: SDUPTHER

## 2025-03-24 RX ORDER — ACETAMINOPHEN 325 MG/1
650 TABLET ORAL EVERY 4 HOURS PRN
Status: DISCONTINUED | OUTPATIENT
Start: 2025-03-24 | End: 2025-03-24 | Stop reason: HOSPADM

## 2025-03-24 RX ORDER — ONDANSETRON 2 MG/ML
4 INJECTION INTRAMUSCULAR; INTRAVENOUS ONCE
Status: DISCONTINUED | OUTPATIENT
Start: 2025-03-24 | End: 2025-03-24 | Stop reason: HOSPADM

## 2025-03-24 RX ORDER — SODIUM CHLORIDE 9 MG/ML
INJECTION, SOLUTION INTRAVENOUS CONTINUOUS
Status: DISCONTINUED | OUTPATIENT
Start: 2025-03-24 | End: 2025-03-24 | Stop reason: HOSPADM

## 2025-03-24 RX ORDER — ONDANSETRON 2 MG/ML
4 INJECTION INTRAMUSCULAR; INTRAVENOUS
Status: DISCONTINUED | OUTPATIENT
Start: 2025-03-24 | End: 2025-03-24 | Stop reason: HOSPADM

## 2025-03-24 RX ORDER — HEPARIN SODIUM 1000 [USP'U]/ML
INJECTION, SOLUTION INTRAVENOUS; SUBCUTANEOUS
Status: DISCONTINUED | OUTPATIENT
Start: 2025-03-24 | End: 2025-03-24 | Stop reason: SDUPTHER

## 2025-03-24 RX ORDER — FENTANYL CITRATE 50 UG/ML
100 INJECTION, SOLUTION INTRAMUSCULAR; INTRAVENOUS
Status: DISCONTINUED | OUTPATIENT
Start: 2025-03-24 | End: 2025-03-24 | Stop reason: HOSPADM

## 2025-03-24 RX ORDER — SUCCINYLCHOLINE/SOD CL,ISO/PF 200MG/10ML
SYRINGE (ML) INTRAVENOUS
Status: DISCONTINUED | OUTPATIENT
Start: 2025-03-24 | End: 2025-03-24 | Stop reason: SDUPTHER

## 2025-03-24 RX ORDER — NALOXONE HYDROCHLORIDE 0.4 MG/ML
INJECTION, SOLUTION INTRAMUSCULAR; INTRAVENOUS; SUBCUTANEOUS PRN
Status: DISCONTINUED | OUTPATIENT
Start: 2025-03-24 | End: 2025-03-24 | Stop reason: HOSPADM

## 2025-03-24 RX ORDER — ROCURONIUM BROMIDE 10 MG/ML
INJECTION, SOLUTION INTRAVENOUS
Status: DISCONTINUED | OUTPATIENT
Start: 2025-03-24 | End: 2025-03-24 | Stop reason: SDUPTHER

## 2025-03-24 RX ADMIN — SODIUM CHLORIDE: 9 INJECTION, SOLUTION INTRAVENOUS at 11:03

## 2025-03-24 RX ADMIN — DEXAMETHASONE SODIUM PHOSPHATE 4 MG: 4 INJECTION, SOLUTION INTRAMUSCULAR; INTRAVENOUS at 10:14

## 2025-03-24 RX ADMIN — PROTAMINE SULFATE 50 MG: 10 INJECTION, SOLUTION INTRAVENOUS at 11:09

## 2025-03-24 RX ADMIN — Medication 200 MG: at 10:11

## 2025-03-24 RX ADMIN — SUGAMMADEX 200 MG: 100 INJECTION, SOLUTION INTRAVENOUS at 11:09

## 2025-03-24 RX ADMIN — ROCURONIUM BROMIDE 20 MG: 10 SOLUTION INTRAVENOUS at 10:14

## 2025-03-24 RX ADMIN — HEPARIN SODIUM 2000 UNITS: 1000 INJECTION, SOLUTION INTRAVENOUS; SUBCUTANEOUS at 10:59

## 2025-03-24 RX ADMIN — CEFAZOLIN 2 G: 330 INJECTION, POWDER, FOR SOLUTION INTRAMUSCULAR; INTRAVENOUS at 10:25

## 2025-03-24 RX ADMIN — Medication 80 MCG: at 10:11

## 2025-03-24 RX ADMIN — HEPARIN SODIUM 10000 UNITS: 1000 INJECTION, SOLUTION INTRAVENOUS; SUBCUTANEOUS at 10:37

## 2025-03-24 RX ADMIN — ONDANSETRON 4 MG: 2 INJECTION INTRAMUSCULAR; INTRAVENOUS at 10:14

## 2025-03-24 RX ADMIN — LIDOCAINE HYDROCHLORIDE 80 MG: 20 INJECTION, SOLUTION EPIDURAL; INFILTRATION; INTRACAUDAL; PERINEURAL at 10:11

## 2025-03-24 RX ADMIN — ROCURONIUM BROMIDE 10 MG: 10 SOLUTION INTRAVENOUS at 10:11

## 2025-03-24 RX ADMIN — SODIUM CHLORIDE: 9 INJECTION, SOLUTION INTRAVENOUS at 10:05

## 2025-03-24 RX ADMIN — PROPOFOL 120 MG: 10 INJECTION, EMULSION INTRAVENOUS at 10:11

## 2025-03-24 RX ADMIN — PHENYLEPHRINE HYDROCHLORIDE 40 MCG/MIN: 10 INJECTION INTRAVENOUS at 10:33

## 2025-03-24 NOTE — DISCHARGE INSTRUCTIONS
Left Atrial Appendage Occlusion (WATCHMAN)   Discharge Instructions      You have just underwent left atrial appendage occlusion utilizing a WATCHMAN device deployment.          There were catheters temporarily placed in your heart through a puncture in the veins in your groin.        WHAT TO EXPECT     If you have had a WATCHMAN procedure please notify Dr. Ramirez immediately if you experience chest discomfort, shortness of breath or feeling like you are going to pass out. If the chest pain persists or becomes severe, please call the office.    Mild to moderate, non-painful, bruising or swelling at the puncture site is not un-common, and will resolve in 7 - 10 days.    You have a small gauze dressing applied to the puncture site in your groin.  You may remove this the following morning.     If you have any dental cleanings/procedures in the next 6 months, you will need prophylactic antibiotics beforehand.  Please contact our office if that's the case.        MEDICATIONS     Restart eliquis tonight.   Take only the medications prescribed to you at discharge.      ACTIVITY     A responsible adult must take you home.  Do not drive a car for 72 hours.    Rest quietly for the remainder of the day.  Do not lift anything greater than 10 pounds for 7 days.  Limit bending at the puncture site and use of stairs for at least 2 days.  You may remove the bandage and shower the morning after the procedure.  Do not take a bath for 3 days.      SYMPTOMS THAT NEED TO BE REPORTED IMMEDIATELY     Bleeding at the puncture site.  If there is bleeding, lie down and hold firm direct pressure for at least 5 minutes.  If the bleeding does not stop, go to the closest emergency room, or call 911.    Temperature more than 100.5 F.  Redness or warmth at the puncture site.  Increasing pain, numbness, coolness or blue discoloration of the extremity where the puncture is located.  Pulsating mass at the puncture site.  A new “lump” at the puncture  Primary Defect Width In Cm (Final Defect Size - Required For Flaps/Grafts): 2.6

## 2025-03-24 NOTE — PROCEDURES
PERCUTANEOUS LEFT ATRIAL APPENDAGE OCCLUSION (WATCHMAN FLX Pro) PROCEDURE  CARDIOVERSION    DATE OF PROCEDURE: 03/24/25  ATTENDING PHYSICIAN: Ananya Ramirez MD, [facility], [facility]    INDICATIONS:  85 yo gentleman with a history of HTN, HFpEF, CAD, SYLVIE, DM, CKD, typical atrial flutter s/p CTI ablation, heart block s/p dual chamber PPM (10/20/23) now dealing with breathing issues and high cost of Eliquis now referred for percutaneous left atrial appendage occlusion procedure.    ANESTHESIA:  General anesthesia with endotracheal intubation was performed for the entire duration of the procedure with monitoring with Intracardiac Ultrasound.    ESTIMATED BLOOD LOSS:  30 cc    COMPLICATIONS:  None    SPECIMENS:  None    FINDINGS:  After informed written consent, the patient was brought to the EP lab in the fasting, nonsedated state.      Preoperative Intracardiac Ultrasound revealed no left atrial appendage thrombus and no evidence of pericardial effusion.  Pre-procedure measurements of the left atrial appendage was performed.  - Max TASHA dimension: 22 mm on AV view on ICE    CARDIOVERSION  Patient was in persistent atrial fibrillation, once no TASHA thrombus was seen, the decision was to cardiovert the patient into sinus rhythm. Three shocks were needed (200J, 300J, then 360J) to convert him to sinus rhythm. Rhythm was confirmed with device interrogation.      The patient was prepped and draped in the usual sterile fashion.  Ultrasound evaluation of possible access sites. Patency of the selected vessel.  Realtime visualization of the vascular needle entry was performed.  Left femoral venous access was obtained using modified Seldinger technique for placement of a 11 [place] venous sheath followed by a Ogden Tomotherapy intracardiac ultrasound.  Intracardiac Ultrasound (Rostelecom 10F ICE catheter) was used to assess baseline anatomy, facilitate transeptal puncture, and confirmation of TASHA location during placement.  Right femoral venous access

## 2025-03-24 NOTE — ANESTHESIA PRE PROCEDURE
Department of Anesthesiology  Preprocedure Note       Name:  Parth Rivero   Age:  84 y.o.  :  1940                                          MRN:  [id number]         Date:  3/24/2025      Surgeon: Surgeon(s):  Ananya Ramirez MD    Procedure: Procedure(s):  Watchman sakina closure device  Dwight during cath case    Medications prior to admission:   Prior to Admission medications    Medication Sig Start Date End Date Taking? Authorizing Provider   aspirin 81 MG EC tablet Take 1 tablet by mouth daily   Yes Gumaro Driscoll MD   apixaban (ELIQUIS) 5 MG TABS tablet Take 1 tablet by mouth 2 times daily 3/13/25  Yes Mamta Jon MD   metFORMIN (GLUCOPHAGE) 850 MG tablet TAKE ONE TABLET BY MOUTH TWICE A DAY WITH MEALS 3/3/25  Yes Jeremi Bland MD   bumetanide (BUMEX) 1 MG tablet TAKE ONE TABLET BY MOUTH EVERY DAY 25  Yes Mamta Jon MD   amLODIPine (NORVASC) 10 MG tablet TAKE 1 TABLET BY MOUTH EVERY DAY 2/10/25  Yes Mamta Jon MD   glipiZIDE (GLUCOTROL) 10 MG tablet TAKE ONE TABLET BY MOUTH TWICE A DAY 25  Yes Jeremi Bland MD   atorvastatin (LIPITOR) 40 MG tablet TAKE 1 TABLET BY MOUTH EVERY DAY 1/3/25  Yes Mamta Jon MD   lisinopril (PRINIVIL;ZESTRIL) 10 MG tablet Take 1 tablet by mouth daily 24  Yes Mamta Jon MD   brimonidine (ALPHAGAN) 0.2 % ophthalmic solution 1 drop into right eye Ophthalmic three times a day for 30 days 24  Yes Gumaro Driscoll MD   dorzolamide-timolol (COSOPT) 2-0.5 % ophthalmic solution  24  Yes Gumaro Driscoll MD   latanoprost (XALATAN) 0.005 % ophthalmic solution INSTILL 1 DROP INTO RIGHT EYE AT BEDTIME 23  Yes Gumaro Driscoll MD   dapagliflozin (FARXIGA) 10 MG tablet Take 1 tablet by mouth daily 9/15/23  Yes Mamta Jon MD   Multiple Vitamins-Minerals (CENTRUM SILVER 50+MEN PO) Take 1 tablet by mouth daily   Yes Gumaro Driscoll MD   OXYGEN 2 lpm at night 3/27/13  Yes Provider, Historical, MD   ferrous

## 2025-03-24 NOTE — PROGRESS NOTES
1204: Updated girlfriend via telephone regarding patient status and discharge disposition.    1213: Head of bed elevated 30degrees right and left groin site without bleeding and no hematoma.     1300: Educated patient about their sedation precautions such as not driving, operating any machinery, or signing legal documents 24 hours post procedure.     Reviewed discharge instructions, including medications and site care using the teach back method. Answered all questions. Verbalized understanding.     1324: Ambulated patient to the bathroom with a steady gait, voided without difficulty. Patient denies chest pain, shortness of breath, or dizziness. Returned to [facility]. Vital signs stable.     Procedural site is clean, dry, and intact. No bleeding, no hematoma.     Assisted patient in dressing self.     Removed peripheral IV.    1334: Escorted to discharge area in a wheelchair with all of their belongings.    Patient's girlfriend present to take patient home. Reviewed discharge instructions with patients' girlfriend, they verbalized understanding.

## 2025-03-24 NOTE — PROGRESS NOTES
EP/Cath LAB to Recovery Room Report    Procedure: 27mm Watchman implant    MD: TRACY Ramirez MD    Verbal Report given to Recovery Nurse on patient being transferred to Recovery Room for routine post-op. Patient stable upon transfer to .  Pt had general sedation with Anesthesia team, who managed MAR, vitals, and airway. Vitals, mental status, MAR, procedural summary discussed with recovery RN.     Cardioversion x3     Procedural Site:  Right femoral vein 15fr sheath removed at 1109, closed with perclose.  Left femoral vein 10fr sheath removed at 1111, closed with perclose.

## 2025-03-24 NOTE — PROGRESS NOTES
TRANSFER - IN Cath Lab RR REPORT:    Verbal report received from Uyen SAHA and CRNA on Parth Rivero  being received from the EP Lab for routine progression of care. Report consisted of patient’s Situation, Background, Assessment and Recommendations(SBAR). Procedural summary and MAR reviewed with receiving nurse. Opportunity for questions and clarification was provided. Assessment completed upon patient’s arrival to Cardiac Cath Lab RECOVERY AREA and care assumed.       Cardiac Cath Lab Recovery Arrival Note:    Parth Rivero arrived to CCL recovery area.  Patient procedure= Watchman. Patient on cardiac monitor, non-invasive blood pressure, SPO2 monitor. On O2 @ 5lpm via Simple Mask. Patient is A&Ox 4. Patient reports no complaints.    PROCEDURE SITE CHECK:    Procedure site: No bleeding and no hematoma, no pain/discomfort reported at procedure site.     No change in patient status. Continue to monitor patient and status.

## 2025-03-24 NOTE — PROGRESS NOTES
Cardiac Cath Lab Recovery Arrival Note:      Parth Rivero arrived to Cardiac Cath Lab, Recovery Area. Staff introduced to patient. Patient identifiers verified with NAME and DATE OF BIRTH. Procedure verified with patient. Consent forms reviewed and signed by patient or authorized representative and verified. Allergies verified.     Patient and family oriented to department. Patient and family informed of procedure and plan of care.     Questions answered with review. Patient prepped for procedure, per orders from physician, prior to arrival.    Patient on cardiac monitor, non-invasive blood pressure, SPO2 monitor. On room air. Patient is A&Ox 4. Patient reports no complaints.     Patient in stretcher, in low position, with side rails up, call bell within reach, patient instructed to call if assistance as needed.    Patient prep in: CCL , [place] FT bay 2.   Patient family has phone # [phone]  Family in: Maggie Gilliam friend.    960  Dr Lynn in to talk with pt  508  Dr Ramirez in to talk with pt, aware that labs needed to be drawn here in hospital  Pre NELIDA/watchman teaching completed

## 2025-03-24 NOTE — PROGRESS NOTES
Cardiac Cath Lab Procedure Area Arrival Note:    Parth Rivero arrived to Cardiac Cath Lab, Procedure Area. Patient identifiers verified with NAME and DATE OF BIRTH. Procedure verified with patient. Consent forms verified. Allergies verified. Patient informed of procedure and plan of care. Questions answered with review. Patient voiced understanding of procedure and plan of care.    Patient on cardiac monitor, non-invasive blood pressure, SPO2 monitor. On room air.  Anesthesia team present during procedure to manage medications, fluids and airway. Patient status doing well without problems. Patient is A&Ox 4. Patient reports no complaints.     Patient medicated during procedure with orders obtained and verified by Dr. Ramirez.    Refer to patients Cardiac Cath Lab PROCEDURE REPORT for vital signs, assessment, status, and response during procedure, printed at end of case. Printed report on chart or scanned into chart.

## 2025-03-24 NOTE — DISCHARGE SUMMARY
Cardiology Discharge Summary               Patient ID:  Parth Rivero  [id number]  84 y.o.  1940    Admit Date: 3/24/2025    Discharge Date: 3/24/2025     Admitting Physician: Ananya Ramirez MD     Discharge Physician: MEAGAN Bruno NP    Admission Diagnoses: Paroxysmal atrial fibrillation (HCC) [I48.0]  Presence of Watchman left atrial appendage closure device [Z95.818]    Discharge Diagnoses: Principal Problem:    Paroxysmal atrial fibrillation (HCC)  Active Problems:    Presence of Watchman left atrial appendage closure device  Resolved Problems:    * No resolved hospital problems. *      Discharge Condition: Good    Cardiology Procedures this Admission:  PERCUTANEOUS LEFT ATRIAL APPENDAGE OCCLUSION (WATCHMAN FLX Pro) PROCEDURE  CARDIOVERSION    Hospital Course:     Patient presented today for scheduled WM implantation procedure  Preoperative intracardiac echo revealed no left atrial appendage thrombus and no evidence of pericardial effusion. Patient was in persistent atrial fibrillation, and once no TASHA thrombus was seen, decision was to cardiovert into sinus rhythm. Three shocks needed (200J, 300J, 360J).  Ultrasound guided vascular access on 3/24/25 with Dr. Ramirez. Successful deployment of 27mm watchman device under fluoroscopic and ICE guidance. The patient was stable and ready for discharge on 3/24/25 with the following plan: Follow-up in the EP clinic as scheduled. Will schedule 45 day post-watchman CT scan. Continue eliquis as prescribed.     Future Appointments   Date Time Provider Department Center   4/7/2025  8:20 AM Luzmaria Krause APRN - NP [facility] BS AMB   5/23/2025 10:00 AM Jeremi Bland MD [facility] DEP   6/17/2025  2:00 PM Mamta Jon MD [facility] BS AMB   3/27/2026  8:40 AM PACEMAKER, STFRANCES [facility] AMB   3/27/2026  9:00 AM Jessica Kelsey APRN - NP [facility] BS AMB         Discharge Exam:     Constitutional: Well-developed and well-nourished. No respiratory distress.

## 2025-03-24 NOTE — ANESTHESIA POSTPROCEDURE EVALUATION
Department of Anesthesiology  Postprocedure Note    Patient: Parth Rivero  MRN: [id number]  YOB: 1940  Date of evaluation: 3/24/2025    Procedure Summary       Date: 03/24/25 Room / Location: [facility] CATH LAB 3 / [facility] CARDIAC CATH LAB    Anesthesia Start: 1005 Anesthesia Stop: 1125    Procedures:       Watchman sakina closure device      Dwight during cath case      Ultrasound guided vascular access      Intracardiac echocardiogram Diagnosis:       Paroxysmal atrial fibrillation (HCC)      (Paroxysmal atrial fibrillation (HCC) [I48.0])    Providers: Ananya Ramirez MD Responsible Provider: Miguelito Lynn DO    Anesthesia Type: General ASA Status: 3            Anesthesia Type: General    Eva Phase I: Eva Score: 9    Eva Phase II:      Anesthesia Post Evaluation    Patient location during evaluation: PACU  Patient participation: complete - patient participated  Level of consciousness: awake  Pain score: 0  Airway patency: patent  Nausea & Vomiting: no nausea  Cardiovascular status: hemodynamically stable  Respiratory status: acceptable  Hydration status: euvolemic  Comments: Seen, no complaints   Pain management: adequate    There were no known notable events for this encounter.

## 2025-03-25 RX ORDER — LISINOPRIL 10 MG/1
10 TABLET ORAL DAILY
Qty: 90 TABLET | Refills: 1 | Status: SHIPPED | OUTPATIENT
Start: 2025-03-25

## 2025-03-26 LAB — ACT BLD: 268 SECS (ref 79–138)

## 2025-04-07 ENCOUNTER — OFFICE VISIT (OUTPATIENT)
Age: 85
End: 2025-04-07
Payer: MEDICARE

## 2025-04-07 VITALS
SYSTOLIC BLOOD PRESSURE: 148 MMHG | HEIGHT: 69 IN | HEART RATE: 78 BPM | WEIGHT: 206.6 LBS | BODY MASS INDEX: 30.6 KG/M2 | OXYGEN SATURATION: 94 % | RESPIRATION RATE: 22 BRPM | DIASTOLIC BLOOD PRESSURE: 56 MMHG

## 2025-04-07 DIAGNOSIS — I48.3 TYPICAL ATRIAL FLUTTER (HCC): ICD-10-CM

## 2025-04-07 DIAGNOSIS — Z79.01 ANTICOAGULATED: ICD-10-CM

## 2025-04-07 DIAGNOSIS — Z98.890 S/P ABLATION OF ATRIAL FLUTTER: ICD-10-CM

## 2025-04-07 DIAGNOSIS — Z95.0 PACEMAKER: ICD-10-CM

## 2025-04-07 DIAGNOSIS — Z95.818 PRESENCE OF WATCHMAN LEFT ATRIAL APPENDAGE CLOSURE DEVICE: Primary | ICD-10-CM

## 2025-04-07 DIAGNOSIS — I48.0 PAF (PAROXYSMAL ATRIAL FIBRILLATION) (HCC): ICD-10-CM

## 2025-04-07 DIAGNOSIS — Z86.79 S/P ABLATION OF ATRIAL FLUTTER: ICD-10-CM

## 2025-04-07 DIAGNOSIS — I10 PRIMARY HYPERTENSION: ICD-10-CM

## 2025-04-07 PROCEDURE — G8427 DOCREV CUR MEDS BY ELIG CLIN: HCPCS | Performed by: NURSE PRACTITIONER

## 2025-04-07 PROCEDURE — 99214 OFFICE O/P EST MOD 30 MIN: CPT | Performed by: NURSE PRACTITIONER

## 2025-04-07 PROCEDURE — 1159F MED LIST DOCD IN RCRD: CPT | Performed by: NURSE PRACTITIONER

## 2025-04-07 PROCEDURE — 1126F AMNT PAIN NOTED NONE PRSNT: CPT | Performed by: NURSE PRACTITIONER

## 2025-04-07 PROCEDURE — 1123F ACP DISCUSS/DSCN MKR DOCD: CPT | Performed by: NURSE PRACTITIONER

## 2025-04-07 PROCEDURE — 3077F SYST BP >= 140 MM HG: CPT | Performed by: NURSE PRACTITIONER

## 2025-04-07 PROCEDURE — 93010 ELECTROCARDIOGRAM REPORT: CPT | Performed by: NURSE PRACTITIONER

## 2025-04-07 PROCEDURE — 93005 ELECTROCARDIOGRAM TRACING: CPT | Performed by: NURSE PRACTITIONER

## 2025-04-07 PROCEDURE — 3078F DIAST BP <80 MM HG: CPT | Performed by: NURSE PRACTITIONER

## 2025-04-07 PROCEDURE — G8417 CALC BMI ABV UP PARAM F/U: HCPCS | Performed by: NURSE PRACTITIONER

## 2025-04-07 PROCEDURE — 1111F DSCHRG MED/CURRENT MED MERGE: CPT | Performed by: NURSE PRACTITIONER

## 2025-04-07 PROCEDURE — 1036F TOBACCO NON-USER: CPT | Performed by: NURSE PRACTITIONER

## 2025-04-07 ASSESSMENT — PATIENT HEALTH QUESTIONNAIRE - PHQ9
SUM OF ALL RESPONSES TO PHQ QUESTIONS 1-9: 0
1. LITTLE INTEREST OR PLEASURE IN DOING THINGS: NOT AT ALL
2. FEELING DOWN, DEPRESSED OR HOPELESS: NOT AT ALL

## 2025-04-07 NOTE — PATIENT INSTRUCTIONS
Please call [phone] to schedule the CT scan after 5/8/25    Follow up with Dr. Ramirez in 6 months

## 2025-04-07 NOTE — PROGRESS NOTES
Room #: 2    Chief Complaint   Patient presents with    Follow-up     Post watchman    Atrial Fibrillation       Vitals:    04/07/25 0821   BP: (!) 148/56   BP Site: Right Upper Arm   Patient Position: Sitting   BP Cuff Size: Medium Adult   Pulse: 78   Resp: 22   SpO2: 94%   Weight: 93.7 kg (206 lb 9.6 oz)   Height: 1.753 m (5' 9\")         Chest pain:  NO    Have you been to the ER, urgent care, or hospitalized outside of [facility] since your last visit?   NO    Refills:  NO  
day for 30 days      dorzolamide-timolol (COSOPT) 2-0.5 % ophthalmic solution       latanoprost (XALATAN) 0.005 % ophthalmic solution INSTILL 1 DROP INTO RIGHT EYE AT BEDTIME      dapagliflozin (FARXIGA) 10 MG tablet Take 1 tablet by mouth daily 90 tablet 3    blood glucose test strips (ASCENSIA AUTODISC VI;ONE TOUCH ULTRA TEST VI) strip by Other route daily      Multiple Vitamins-Minerals (CENTRUM SILVER 50+MEN PO) Take 1 tablet by mouth daily      OXYGEN 2 lpm at night      ferrous sulfate (SLOW FE) 142 (45 Fe) MG extended release tablet daily       No current facility-administered medications for this visit.          MEAGAN Subramanian - NP  [facility]  [address], [address]  [place], [place] [address] [phone]      CC:Jeremi Bland MD

## 2025-05-01 ENCOUNTER — TELEPHONE (OUTPATIENT)
Age: 85
End: 2025-05-01

## 2025-05-01 NOTE — TELEPHONE ENCOUNTER
Attempted to reach patient by telephone. HIPAA compliant message was left for return call.     Called to remind patient to schedule his post watchman CTA.

## 2025-05-05 ENCOUNTER — TELEPHONE (OUTPATIENT)
Age: 85
End: 2025-05-05

## 2025-05-05 NOTE — TELEPHONE ENCOUNTER
Called [facility] to ask on status of application,     They are missing the patient form.    Have received MD form & 1040.

## 2025-05-06 NOTE — TELEPHONE ENCOUNTER
Pt came to office,     Confirmed he will not need assistance with Eliquis as he had the Watchman placed 3/24/25.  Discussed with him the timeline of >45 days after, having a chest CTA.  Then once he receives the phone call confirming completion of TASHA closure, will d/c Eliquis, continue Plavix & ASA x6 mos, then ASA only after.    Pt expressed understanding of plan.  I provided central scheduling number and recommended to call sooner than later for chest cta appointment after 6/5/25.

## 2025-05-13 NOTE — TELEPHONE ENCOUNTER
Called patient, ID verified using two patient identifiers.  Advised Mr. Rivero that I was calling to remind him to schedule his post watchman CTA.    Mr. Rivero states he has the number and will call tomorrow to get it scheduled.

## 2025-05-14 NOTE — TELEPHONE ENCOUNTER
Post watchman CTA scheduled.    Future Appointments   Date Time Provider Department Center   5/23/2025 10:00 AM Jeremi Bland MD [facility] BS ECC DEP   6/5/2025 12:00 PM [facility] CT 1 [facility]   6/17/2025  2:00 PM Mamta Jon MD [facility] BS AMB   11/14/2025  8:00 AM Ananya Ramirez MD [facility] BS AMB   3/27/2026  8:40 AM PACEMAKER, STFRANCES [facility] BS AMB   3/27/2026  9:00 AM Jessica Kelsey APRN - NP [facility] BS AMB

## 2025-05-21 NOTE — PROGRESS NOTES
NAME:  Parth Rivero   :   1940   MRN:   [id number]     Date/Time:  2025 9:27 PM  Subjective: f/u hbp,dm2,chol,cad,paf.Feeling well   HPI   Diabetes  He presents for his follow-up diabetic visit. His disease course has been stable. Pertinent negatives for hypoglycemia include no tremors. Pertinent negatives for diabetes include no chest pain, no fatigue, no foot paresthesias, no polyphagia, no polyuria, no visual change and no weakness. There are no hypoglycemic complications. Pertinent negatives for hypoglycemia complications include no blackouts. Diabetic complications include peripheral neuropathy.    Hypertension    The history is provided by the Patient.  This is a chronic problem.  The problem has been gradually worsening. Pertinent negatives include no malaise/fatigue  and no peripheral edema.    Cholesterol Problem   The history is provided by the Patient.  This is a chronic problem.  The problem occurs daily. The problem has been gradually improving   .  Review of Systems   Constitutional:  Negative for activity change.   HENT:  Positive for congestion.    Respiratory:  Negative for chest tightness and shortness of breath.    Cardiovascular:  Negative for chest pain, palpitations and leg swelling.   Gastrointestinal:  Negative for abdominal distention.   Musculoskeletal:  Negative for arthralgias.             Medications reviewed:  Current Outpatient Medications   Medication Sig    lisinopril (PRINIVIL;ZESTRIL) 10 MG tablet TAKE 1 TABLET BY MOUTH DAILY    aspirin 81 MG EC tablet Take 1 tablet by mouth daily    apixaban (ELIQUIS) 5 MG TABS tablet Take 1 tablet by mouth 2 times daily    metFORMIN (GLUCOPHAGE) 850 MG tablet TAKE ONE TABLET BY MOUTH TWICE A DAY WITH MEALS    bumetanide (BUMEX) 1 MG tablet TAKE ONE TABLET BY MOUTH EVERY DAY    amLODIPine (NORVASC) 10 MG tablet TAKE 1 TABLET BY MOUTH EVERY DAY    glipiZIDE (GLUCOTROL) 10 MG tablet TAKE ONE TABLET BY MOUTH TWICE A DAY

## 2025-05-23 ENCOUNTER — OFFICE VISIT (OUTPATIENT)
Age: 85
End: 2025-05-23
Payer: MEDICARE

## 2025-05-23 VITALS
HEIGHT: 69 IN | DIASTOLIC BLOOD PRESSURE: 69 MMHG | TEMPERATURE: 97.8 F | WEIGHT: 201.2 LBS | OXYGEN SATURATION: 92 % | RESPIRATION RATE: 18 BRPM | HEART RATE: 83 BPM | SYSTOLIC BLOOD PRESSURE: 142 MMHG | BODY MASS INDEX: 29.8 KG/M2

## 2025-05-23 DIAGNOSIS — I48.0 PAROXYSMAL ATRIAL FIBRILLATION (HCC): ICD-10-CM

## 2025-05-23 DIAGNOSIS — E78.2 MIXED HYPERLIPIDEMIA: ICD-10-CM

## 2025-05-23 DIAGNOSIS — Z95.818 PRESENCE OF WATCHMAN LEFT ATRIAL APPENDAGE CLOSURE DEVICE: ICD-10-CM

## 2025-05-23 DIAGNOSIS — Z86.79 S/P ABLATION OF ATRIAL FIBRILLATION: ICD-10-CM

## 2025-05-23 DIAGNOSIS — E11.21 TYPE 2 DIABETES MELLITUS WITH DIABETIC NEPHROPATHY, WITHOUT LONG-TERM CURRENT USE OF INSULIN (HCC): ICD-10-CM

## 2025-05-23 DIAGNOSIS — G62.9 POLYNEUROPATHY, UNSPECIFIED: ICD-10-CM

## 2025-05-23 DIAGNOSIS — N18.31 STAGE 3A CHRONIC KIDNEY DISEASE (HCC): ICD-10-CM

## 2025-05-23 DIAGNOSIS — Z98.890 S/P ABLATION OF ATRIAL FIBRILLATION: ICD-10-CM

## 2025-05-23 DIAGNOSIS — Z79.01 ANTICOAGULATED: ICD-10-CM

## 2025-05-23 DIAGNOSIS — I10 ESSENTIAL HYPERTENSION, BENIGN: Primary | ICD-10-CM

## 2025-05-23 LAB — HBA1C MFR BLD: 7.9 %

## 2025-05-23 PROCEDURE — 83036 HEMOGLOBIN GLYCOSYLATED A1C: CPT | Performed by: FAMILY MEDICINE

## 2025-05-23 PROCEDURE — 1036F TOBACCO NON-USER: CPT | Performed by: FAMILY MEDICINE

## 2025-05-23 PROCEDURE — 1123F ACP DISCUSS/DSCN MKR DOCD: CPT | Performed by: FAMILY MEDICINE

## 2025-05-23 PROCEDURE — 3051F HG A1C>EQUAL 7.0%<8.0%: CPT | Performed by: FAMILY MEDICINE

## 2025-05-23 PROCEDURE — PBSHW AMB POC HEMOGLOBIN A1C: Performed by: FAMILY MEDICINE

## 2025-05-23 PROCEDURE — 1159F MED LIST DOCD IN RCRD: CPT | Performed by: FAMILY MEDICINE

## 2025-05-23 PROCEDURE — 1125F AMNT PAIN NOTED PAIN PRSNT: CPT | Performed by: FAMILY MEDICINE

## 2025-05-23 PROCEDURE — G8417 CALC BMI ABV UP PARAM F/U: HCPCS | Performed by: FAMILY MEDICINE

## 2025-05-23 PROCEDURE — 3077F SYST BP >= 140 MM HG: CPT | Performed by: FAMILY MEDICINE

## 2025-05-23 PROCEDURE — 3078F DIAST BP <80 MM HG: CPT | Performed by: FAMILY MEDICINE

## 2025-05-23 PROCEDURE — 99213 OFFICE O/P EST LOW 20 MIN: CPT | Performed by: FAMILY MEDICINE

## 2025-05-23 PROCEDURE — G8427 DOCREV CUR MEDS BY ELIG CLIN: HCPCS | Performed by: FAMILY MEDICINE

## 2025-05-23 ASSESSMENT — PATIENT HEALTH QUESTIONNAIRE - PHQ9
1. LITTLE INTEREST OR PLEASURE IN DOING THINGS: NOT AT ALL
SUM OF ALL RESPONSES TO PHQ QUESTIONS 1-9: 0
2. FEELING DOWN, DEPRESSED OR HOPELESS: NOT AT ALL
SUM OF ALL RESPONSES TO PHQ QUESTIONS 1-9: 0

## 2025-05-23 ASSESSMENT — ENCOUNTER SYMPTOMS
SHORTNESS OF BREATH: 0
ABDOMINAL DISTENTION: 0
CHEST TIGHTNESS: 0

## 2025-05-23 NOTE — PROGRESS NOTES
Chief Complaint   Patient presents with    Follow-up     Patient is here today for a 4 month follow up.      \"Have you been to the ER, urgent care clinic since your last visit?  Hospitalized since your last visit?\"    3/24/25 [facility] for Watchman Surgery    “Have you seen or consulted any other health care providers outside of [facility] since your last visit?”    NO            Click Here for Release of Records Request

## 2025-06-04 RX ORDER — IOPAMIDOL 755 MG/ML
100 INJECTION, SOLUTION INTRAVASCULAR
Status: COMPLETED | OUTPATIENT
Start: 2025-06-04 | End: 2025-06-05

## 2025-06-05 ENCOUNTER — HOSPITAL ENCOUNTER (OUTPATIENT)
Facility: HOSPITAL | Age: 85
Discharge: HOME | End: 2025-06-08
Payer: MEDICARE

## 2025-06-05 DIAGNOSIS — Z79.01 ANTICOAGULATED: ICD-10-CM

## 2025-06-05 DIAGNOSIS — Z98.890 S/P ABLATION OF ATRIAL FLUTTER: ICD-10-CM

## 2025-06-05 DIAGNOSIS — I48.0 PAF (PAROXYSMAL ATRIAL FIBRILLATION) (HCC): ICD-10-CM

## 2025-06-05 DIAGNOSIS — Z95.0 PACEMAKER: ICD-10-CM

## 2025-06-05 DIAGNOSIS — Z86.79 S/P ABLATION OF ATRIAL FLUTTER: ICD-10-CM

## 2025-06-05 DIAGNOSIS — I48.3 TYPICAL ATRIAL FLUTTER (HCC): ICD-10-CM

## 2025-06-05 DIAGNOSIS — I10 PRIMARY HYPERTENSION: ICD-10-CM

## 2025-06-05 DIAGNOSIS — Z95.818 PRESENCE OF WATCHMAN LEFT ATRIAL APPENDAGE CLOSURE DEVICE: ICD-10-CM

## 2025-06-05 LAB — CREAT BLD-MCNC: 1.6 MG/DL (ref 0.6–1.3)

## 2025-06-05 PROCEDURE — 82565 ASSAY OF CREATININE: CPT

## 2025-06-05 PROCEDURE — 6360000004 HC RX CONTRAST MEDICATION: Performed by: FAMILY MEDICINE

## 2025-06-05 PROCEDURE — 71275 CT ANGIOGRAPHY CHEST: CPT

## 2025-06-05 RX ADMIN — IOPAMIDOL 100 ML: 755 INJECTION, SOLUTION INTRAVENOUS at 11:59

## 2025-06-13 NOTE — TELEPHONE ENCOUNTER
Samples per Dr. Jon's VO of Eliquis 5 mg.    Continues Eliquis & ASA 81 mg po daily post procedure.  He reports some AF in the early days post procedure, but has been better more recently.  He notes some increased bruising on current regimen.     Chest CT not yet read.

## 2025-06-14 PROCEDURE — 93294 REM INTERROG EVL PM/LDLS PM: CPT | Performed by: INTERNAL MEDICINE

## 2025-06-15 ENCOUNTER — RESULTS FOLLOW-UP (OUTPATIENT)
Age: 85
End: 2025-06-15

## 2025-06-17 ENCOUNTER — OFFICE VISIT (OUTPATIENT)
Age: 85
End: 2025-06-17
Payer: MEDICARE

## 2025-06-17 VITALS
SYSTOLIC BLOOD PRESSURE: 108 MMHG | HEART RATE: 64 BPM | HEIGHT: 69 IN | WEIGHT: 205 LBS | OXYGEN SATURATION: 93 % | BODY MASS INDEX: 30.36 KG/M2 | DIASTOLIC BLOOD PRESSURE: 62 MMHG

## 2025-06-17 DIAGNOSIS — E11.40 CONTROLLED TYPE 2 DIABETES MELLITUS WITH DIABETIC NEUROPATHY, WITHOUT LONG-TERM CURRENT USE OF INSULIN (HCC): ICD-10-CM

## 2025-06-17 DIAGNOSIS — I25.118 CORONARY ARTERY DISEASE OF NATIVE HEART WITH STABLE ANGINA PECTORIS, UNSPECIFIED VESSEL OR LESION TYPE: ICD-10-CM

## 2025-06-17 DIAGNOSIS — I48.0 PAROXYSMAL ATRIAL FIBRILLATION (HCC): Primary | ICD-10-CM

## 2025-06-17 DIAGNOSIS — E78.5 DYSLIPIDEMIA: ICD-10-CM

## 2025-06-17 PROCEDURE — 1123F ACP DISCUSS/DSCN MKR DOCD: CPT | Performed by: SPECIALIST

## 2025-06-17 PROCEDURE — 3078F DIAST BP <80 MM HG: CPT | Performed by: SPECIALIST

## 2025-06-17 PROCEDURE — 1036F TOBACCO NON-USER: CPT | Performed by: SPECIALIST

## 2025-06-17 PROCEDURE — 1126F AMNT PAIN NOTED NONE PRSNT: CPT | Performed by: SPECIALIST

## 2025-06-17 PROCEDURE — 1160F RVW MEDS BY RX/DR IN RCRD: CPT | Performed by: SPECIALIST

## 2025-06-17 PROCEDURE — 3074F SYST BP LT 130 MM HG: CPT | Performed by: SPECIALIST

## 2025-06-17 PROCEDURE — 1159F MED LIST DOCD IN RCRD: CPT | Performed by: SPECIALIST

## 2025-06-17 PROCEDURE — G8427 DOCREV CUR MEDS BY ELIG CLIN: HCPCS | Performed by: SPECIALIST

## 2025-06-17 PROCEDURE — 99214 OFFICE O/P EST MOD 30 MIN: CPT | Performed by: SPECIALIST

## 2025-06-17 PROCEDURE — G8417 CALC BMI ABV UP PARAM F/U: HCPCS | Performed by: SPECIALIST

## 2025-06-17 PROCEDURE — 3051F HG A1C>EQUAL 7.0%<8.0%: CPT | Performed by: SPECIALIST

## 2025-06-17 RX ORDER — CLOPIDOGREL BISULFATE 75 MG/1
75 TABLET ORAL DAILY
Qty: 100 TABLET | Refills: 0 | Status: SHIPPED | OUTPATIENT
Start: 2025-06-17 | End: 2025-09-24

## 2025-06-17 NOTE — PROGRESS NOTES
Chief Complaint   Patient presents with    Coronary Artery Disease    Atrial Fibrillation     PAF    AV NODE DYSFUNCTION     Vitals:    06/17/25 1349   BP: 108/62   BP Site: Left Upper Arm   Patient Position: Sitting   Pulse: 64   SpO2: 93%   Weight: 93 kg (205 lb)   Height: 1.753 m (5' 9\")         Chest pain: DENIED     Recent hospital stays: DENIED     Refills: DENIED

## 2025-06-17 NOTE — PROGRESS NOTES
Mamta Jon MD. [facility]          Patient: Parth Rivero  : 1940      Today's Date: 2025        HISTORY OF PRESENT ILLNESS:     History of Present Illness:  Feels fine -- has class 2 BEEBE.  He has been cutting wood.          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[facility]) 2014    Encounter for long-term (current) use of other medications     Hypertension     Mixed hyperlipidemia 2010    pt reports medication was started due to history of DM    Obesity     SYLVIE (obstructive sleep apnea) 2010    Unspecified sleep apnea     O2 AT NIGHT 2L, CANNOT USE CPAP MASK       Past Surgical History:   Procedure Laterality Date    CARDIAC PROCEDURE N/A 3/24/2025    Dwight during cath case performed by Ananya Ramirez MD at [facility] CARDIAC CATH LAB    CARDIAC PROCEDURE N/A 3/24/2025    Intracardiac echocardiogram performed by Ananya Ramirez MD at [facility] CARDIAC CATH LAB    EP DEVICE PROCEDURE N/A 2023    Ablation A-flutter performed by Ananya Ramirez MD at [facility] CARDIAC CATH LAB    EP DEVICE PROCEDURE N/A 10/20/2023    Insert PPM dual performed by Ananya Ramirez MD at [facility] CARDIAC CATH LAB    EP DEVICE PROCEDURE N/A 3/24/2025    Watchman sakina closure device performed by Ananya Ramirez MD at [facility] CARDIAC CATH LAB    INVASIVE VASCULAR N/A 10/20/2023    Ultrasound guided vascular access performed by Ananya Ramirez MD at [facility] CARDIAC CATH LAB    INVASIVE VASCULAR N/A 3/24/2025    Ultrasound guided vascular access performed by Ananya Ramirez MD at [facility] CARDIAC CATH LAB    KNEE ARTHROSCOPY Right     KNEE ARTHROSCOPY Left     LUMBAR LAMINECTOMY      OTHER SURGICAL HISTORY  1971    CIRCUMCISION, VASECTOMY    TOTAL COLECTOMY  1997    RESECTION    WISDOM TOOTH EXTRACTION               CURRENT MEDICATIONS:

## 2025-06-17 NOTE — TELEPHONE ENCOUNTER
Pt in office today stated that pharmacy had a question about medications and hasn't released it yet.  Will call to discuss change in therapy.    Spoke to pharmacist, he stated they needed to order the Plavix; should be ready tomorrow.  Will relay to pt while in office.

## 2025-06-25 ENCOUNTER — RESULTS FOLLOW-UP (OUTPATIENT)
Age: 85
End: 2025-06-25

## 2025-06-25 LAB
ALBUMIN SERPL-MCNC: 4.4 G/DL (ref 3.7–4.7)
ALP SERPL-CCNC: 83 IU/L (ref 44–121)
ALT SERPL-CCNC: 11 IU/L (ref 0–44)
AST SERPL-CCNC: 18 IU/L (ref 0–40)
BILIRUB SERPL-MCNC: 0.4 MG/DL (ref 0–1.2)
BUN SERPL-MCNC: 26 MG/DL (ref 8–27)
BUN/CREAT SERPL: 21 (ref 10–24)
CALCIUM SERPL-MCNC: 9.6 MG/DL (ref 8.6–10.2)
CHLORIDE SERPL-SCNC: 102 MMOL/L (ref 96–106)
CO2 SERPL-SCNC: 23 MMOL/L (ref 20–29)
CREAT SERPL-MCNC: 1.25 MG/DL (ref 0.76–1.27)
EGFRCR SERPLBLD CKD-EPI 2021: 56 ML/MIN/1.73
GLOBULIN SER CALC-MCNC: 2.7 G/DL (ref 1.5–4.5)
GLUCOSE SERPL-MCNC: 152 MG/DL (ref 70–99)
POTASSIUM SERPL-SCNC: 4.9 MMOL/L (ref 3.5–5.2)
PROT SERPL-MCNC: 7.1 G/DL (ref 6–8.5)
SODIUM SERPL-SCNC: 141 MMOL/L (ref 134–144)

## 2025-06-26 LAB
CHOLEST SERPL-MCNC: 129 MG/DL (ref 100–199)
HDL SERPL-SCNC: 26.6 UMOL/L
HDLC SERPL-MCNC: 35 MG/DL
LDL SERPL QN: 21.1 NM
LDL SERPL-SCNC: 844 NMOL/L
LDL SMALL SERPL-SCNC: 368 NMOL/L
LDLC SERPL CALC-MCNC: 67 MG/DL (ref 0–99)
LP-IR SCORE SERPL: 45
TRIGL SERPL-MCNC: 155 MG/DL (ref 0–149)

## 2025-07-30 DIAGNOSIS — I10 ESSENTIAL (PRIMARY) HYPERTENSION: ICD-10-CM

## 2025-07-30 RX ORDER — AMLODIPINE BESYLATE 10 MG/1
10 TABLET ORAL DAILY
Qty: 90 TABLET | Refills: 3 | Status: SHIPPED | OUTPATIENT
Start: 2025-07-30

## 2025-07-30 NOTE — TELEPHONE ENCOUNTER
Refill per VO of Dr. Jon  Last appt: 6/17/2025    Future Appointments   Date Time Provider Department Center   9/24/2025 10:00 AM Jeremi Bland MD [facility]   11/14/2025  8:00 AM Ananya Ramirez MD [facility]   3/27/2026  8:40 AM PACEMAKER, [facility]   3/27/2026  9:00 AM Jessica Kelsey APRN - NP [facility]   6/24/2026 10:00 AM Carlos Jon MD [facility]       Requested Prescriptions     Signed Prescriptions Disp Refills    amLODIPine (NORVASC) 10 MG tablet 90 tablet 3     Sig: TAKE 1 TABLET BY MOUTH EVERY DAY     Authorizing Provider: CARLOS JON     Ordering User: GRIFFIN RASCON

## 2025-09-02 RX ORDER — LISINOPRIL 10 MG/1
10 TABLET ORAL DAILY
Qty: 90 TABLET | Refills: 3 | Status: SHIPPED | OUTPATIENT
Start: 2025-09-02

## 2025-09-04 DIAGNOSIS — I25.118 CORONARY ARTERY DISEASE OF NATIVE HEART WITH STABLE ANGINA PECTORIS, UNSPECIFIED VESSEL OR LESION TYPE: ICD-10-CM

## 2025-09-04 DIAGNOSIS — I10 ESSENTIAL (PRIMARY) HYPERTENSION: Primary | ICD-10-CM

## 2025-09-04 DIAGNOSIS — I48.0 PAF (PAROXYSMAL ATRIAL FIBRILLATION) (HCC): ICD-10-CM

## 2025-09-04 DIAGNOSIS — N18.31 STAGE 3A CHRONIC KIDNEY DISEASE (HCC): ICD-10-CM

## 2025-09-04 DIAGNOSIS — I45.9 HEART BLOCK: ICD-10-CM

## 2025-09-04 DIAGNOSIS — E78.5 DYSLIPIDEMIA: ICD-10-CM

## 2025-09-04 RX ORDER — DAPAGLIFLOZIN 10 MG/1
10 TABLET, FILM COATED ORAL DAILY
Qty: 90 TABLET | Refills: 3 | Status: SHIPPED | OUTPATIENT
Start: 2025-09-04

## (undated) DEVICE — CATH GUID COR EB35 6FR 100CM -- LAUNCHER

## (undated) DEVICE — PINNACLE INTRODUCER SHEATH: Brand: PINNACLE

## (undated) DEVICE — ANGIO-SEAL VIP VASCULAR CLOSURE DEVICE: Brand: ANGIO-SEAL

## (undated) DEVICE — DEVICE INFL 20ML 30ATM DGT FLD DISPNS SYR W ACCESSPLUS BLU

## (undated) DEVICE — PACK PROCEDURE SURG HRT CATH

## (undated) DEVICE — TREK CORONARY DILATATION CATHETER 2.50 MM X 15 MM / RAPID-EXCHANGE: Brand: TREK

## (undated) DEVICE — PADPRO DEFIBRILLATION/PACING/CARDIOVERSION/MONITORING ELECTRODES, ADULT/CHILD GREATER THAN 10 KG RADIOTRANSPARENT ELECTRODE, PHYSIO-CONTROL QUIK-COMBO (M) 60" (152 CM): Brand: PADPRO

## (undated) DEVICE — TUBING PMP FOR CARTO SYS SMARTABLATE

## (undated) DEVICE — NEEDLE ANGIO 18GA L9CM NRML 1 WALL SMOOTH FINISH CLR HUB FOR

## (undated) DEVICE — SOUNDSTAR REPROC ECO 8F DGNSTC ULTRSND CATH USE GE IMGNG SSTM

## (undated) DEVICE — HI-TORQUE VERSACORE MODIFIED J GUIDE WIRE SYSTEM 145 CM: Brand: HI-TORQUE VERSACORE

## (undated) DEVICE — ELECTRODE ES AD CRD L15FT DISP FOR PT BELOW 30LB REM

## (undated) DEVICE — APPLICATOR MEDICATED 10.5 CC SOLUTION CLR STRL CHLORAPREP

## (undated) DEVICE — ELECTRODE,RADIOTRANSLUCENT,FOAM,5PK: Brand: MEDLINE

## (undated) DEVICE — 1 X VERSACROSS CONNECT TRANSSEPTAL DILATOR (INCLUDING 1 X J-TIP MECHANICAL GUIDEWIRE); 1 X VERSACROSS RF WIRE (INCLUDING 1 X CONNECTOR CABLE (SINGLE USE)); 1 X DISPERSIVE ELECTRODE: Brand: VERSACROSS CONNECT LAAC ACCESS SOLUTION

## (undated) DEVICE — ELECTRODE PT RET AD L9FT HI MOIST COND ADH HYDRGEL CORDED

## (undated) DEVICE — MTS LEFT HEART KIT ST MARY'S RICHMOND: Brand: NAMIC

## (undated) DEVICE — INTRO SHEATH TRANSSEPTAL 8.5FRX71CM

## (undated) DEVICE — SYRINGE 20ML LL S/C 50

## (undated) DEVICE — CATHETER ETER DIAG L110CM OD6FR VASC PGTL W SIDE H COR W OUT

## (undated) DEVICE — TOTAL TRAY, 16FR 10ML SIL FOLEY, URN: Brand: MEDLINE

## (undated) DEVICE — ACCESS SHEATH WITH DILATOR: Brand: WATCHMAN FXD CURVE™ ACCESS SYSTEM

## (undated) DEVICE — CATH DIAG-D 6F MULTI PIG 155 5 -- IMPULSE 16599-302

## (undated) DEVICE — SYRINGE MED 10ML RED POLYCARB BRL FIX M LUER CONN FLAT GRP

## (undated) DEVICE — CATHETER EP 7FR L115CM 2-8-2MM SPC TIP 2MM 10 ELECTRD F L

## (undated) DEVICE — PROCEDURE KIT FLUID MGMT CUST MAINFOLD STRL

## (undated) DEVICE — CATHETER ABLAT 8FR L115CM 1-6-2MM SPC TIP 3.5MM DF CRV

## (undated) DEVICE — 3M™ TEGADERM™ TRANSPARENT FILM DRESSING FRAME STYLE, 1626W, 4 IN X 4-3/4 IN (10 CM X 12 CM), 50/CT 4CT/CASE: Brand: 3M™ TEGADERM™

## (undated) DEVICE — TUBING PRSS MON L6IN PVC M FEM CONN

## (undated) DEVICE — CATH ANGI BLLN DIL 3.0X20MM -- NC EUPHORA

## (undated) DEVICE — PATCH REF EXT FOR CARTO 3 SYS (EA = 6 PACKS)

## (undated) DEVICE — ROYALSILK SURGICAL GOWN, L: Brand: CONVERTORS

## (undated) DEVICE — PRESSURE MONITORING SET: Brand: TRUWAVE

## (undated) DEVICE — FIXED CORE WIRE GUIDE SAFE-T-J, CURVED: Brand: COOK

## (undated) DEVICE — GUIDEWIRE VASC L185CM DIA0.014IN HYDRPHLC PTFE STR TIP

## (undated) DEVICE — PERCLOSE PROGLIDE™ SUTURE-MEDIATED CLOSURE SYSTEM: Brand: PERCLOSE PROGLIDE™

## (undated) DEVICE — PROVE COVER: Brand: UNBRANDED

## (undated) DEVICE — SWAN-GANZ HI-SHORE TRUE SIZE THERMODILUTION CATHETER: Brand: SWAN-GANZ HI-SHORE TRUE SIZE

## (undated) DEVICE — CATHETER ULTRASOUND NUVISION ICE OD10 FR

## (undated) DEVICE — ROYAL SILK SURGICAL GOWN, XXL: Brand: CONVERTORS